# Patient Record
Sex: MALE | Race: WHITE | Employment: OTHER | URBAN - METROPOLITAN AREA
[De-identification: names, ages, dates, MRNs, and addresses within clinical notes are randomized per-mention and may not be internally consistent; named-entity substitution may affect disease eponyms.]

---

## 2017-01-17 ENCOUNTER — LAB REQUISITION (OUTPATIENT)
Dept: LAB | Facility: HOSPITAL | Age: 82
End: 2017-01-17
Payer: MEDICARE

## 2017-01-17 DIAGNOSIS — R73.01 IMPAIRED FASTING GLUCOSE: ICD-10-CM

## 2017-01-17 DIAGNOSIS — E78.00 PURE HYPERCHOLESTEROLEMIA: ICD-10-CM

## 2017-01-17 DIAGNOSIS — E03.9 HYPOTHYROIDISM: ICD-10-CM

## 2017-01-17 DIAGNOSIS — E83.110 HEREDITARY HEMOCHROMATOSIS (HCC): ICD-10-CM

## 2017-01-17 LAB
ALT SERPL-CCNC: 16 U/L (ref 17–63)
ANION GAP SERPL CALC-SCNC: 10 MMOL/L (ref 0–18)
BASOPHILS # BLD: 0.1 K/UL (ref 0–0.2)
BASOPHILS NFR BLD: 3 %
BUN SERPL-MCNC: 16 MG/DL (ref 8–20)
BUN/CREAT SERPL: 17 (ref 10–20)
CALCIUM SERPL-MCNC: 9.1 MG/DL (ref 8.5–10.5)
CHLORIDE SERPL-SCNC: 106 MMOL/L (ref 95–110)
CHOLEST SERPL-MCNC: 128 MG/DL (ref 110–200)
CO2 SERPL-SCNC: 26 MMOL/L (ref 22–32)
CREAT SERPL-MCNC: 0.94 MG/DL (ref 0.5–1.5)
EOSINOPHIL # BLD: 0.2 K/UL (ref 0–0.7)
EOSINOPHIL NFR BLD: 5 %
ERYTHROCYTE [DISTWIDTH] IN BLOOD BY AUTOMATED COUNT: 21 % (ref 11–15)
GLUCOSE SERPL-MCNC: 95 MG/DL (ref 70–99)
HBA1C MFR BLD: 5.1 % (ref 4–6)
HCT VFR BLD AUTO: 29.6 % (ref 41–52)
HDLC SERPL-MCNC: 67 MG/DL
HGB BLD-MCNC: 10 G/DL (ref 13.5–17.5)
LDLC SERPL CALC-MCNC: 55 MG/DL (ref 0–99)
LYMPHOCYTES # BLD: 1.2 K/UL (ref 1–4)
LYMPHOCYTES NFR BLD: 33 %
MCH RBC QN AUTO: 36.2 PG (ref 27–32)
MCHC RBC AUTO-ENTMCNC: 33.8 G/DL (ref 32–37)
MCV RBC AUTO: 107.2 FL (ref 80–100)
MONOCYTES # BLD: 0.7 K/UL (ref 0–1)
MONOCYTES NFR BLD: 19 %
NEUTROPHILS # BLD AUTO: 1.5 K/UL (ref 1.8–7.7)
NEUTROPHILS NFR BLD: 41 %
NONHDLC SERPL-MCNC: 61 MG/DL
OSMOLALITY UR CALC.SUM OF ELEC: 295 MOSM/KG (ref 275–295)
PLATELET # BLD AUTO: 351 K/UL (ref 140–400)
PMV BLD AUTO: 7.7 FL (ref 7.4–10.3)
POTASSIUM SERPL-SCNC: 3.7 MMOL/L (ref 3.3–5.1)
RBC # BLD AUTO: 2.76 M/UL (ref 4.5–5.9)
SODIUM SERPL-SCNC: 142 MMOL/L (ref 136–144)
TRIGL SERPL-MCNC: 29 MG/DL (ref 1–149)
TSH SERPL-ACNC: 4.76 UIU/ML (ref 0.34–5.6)
WBC # BLD AUTO: 3.7 K/UL (ref 4–11)

## 2017-01-17 PROCEDURE — 83036 HEMOGLOBIN GLYCOSYLATED A1C: CPT | Performed by: INTERNAL MEDICINE

## 2017-01-17 PROCEDURE — 80061 LIPID PANEL: CPT | Performed by: INTERNAL MEDICINE

## 2017-01-17 PROCEDURE — 85025 COMPLETE CBC W/AUTO DIFF WBC: CPT | Performed by: INTERNAL MEDICINE

## 2017-01-17 PROCEDURE — 80048 BASIC METABOLIC PNL TOTAL CA: CPT | Performed by: INTERNAL MEDICINE

## 2017-01-17 PROCEDURE — 84460 ALANINE AMINO (ALT) (SGPT): CPT | Performed by: INTERNAL MEDICINE

## 2017-01-17 PROCEDURE — 84443 ASSAY THYROID STIM HORMONE: CPT | Performed by: INTERNAL MEDICINE

## 2017-07-18 ENCOUNTER — LAB REQUISITION (OUTPATIENT)
Dept: LAB | Facility: HOSPITAL | Age: 82
End: 2017-07-18
Payer: MEDICARE

## 2017-07-18 DIAGNOSIS — R73.01 IMPAIRED FASTING GLUCOSE: ICD-10-CM

## 2017-07-18 DIAGNOSIS — E78.00 PURE HYPERCHOLESTEROLEMIA: ICD-10-CM

## 2017-07-18 LAB
ALBUMIN SERPL BCP-MCNC: 4.3 G/DL (ref 3.5–4.8)
ALBUMIN/GLOB SERPL: 2 {RATIO} (ref 1–2)
ALP SERPL-CCNC: 32 U/L (ref 32–100)
ALT SERPL-CCNC: 21 U/L (ref 17–63)
ANION GAP SERPL CALC-SCNC: 7 MMOL/L (ref 0–18)
AST SERPL-CCNC: 27 U/L (ref 15–41)
BASOPHILS # BLD: 0.1 K/UL (ref 0–0.2)
BASOPHILS NFR BLD: 3 %
BILIRUB SERPL-MCNC: 1.5 MG/DL (ref 0.3–1.2)
BUN SERPL-MCNC: 17 MG/DL (ref 8–20)
BUN/CREAT SERPL: 20.5 (ref 10–20)
CALCIUM SERPL-MCNC: 8.7 MG/DL (ref 8.5–10.5)
CHLORIDE SERPL-SCNC: 106 MMOL/L (ref 95–110)
CHOLEST SERPL-MCNC: 122 MG/DL (ref 110–200)
CO2 SERPL-SCNC: 25 MMOL/L (ref 22–32)
CREAT SERPL-MCNC: 0.83 MG/DL (ref 0.5–1.5)
EOSINOPHIL # BLD: 0.2 K/UL (ref 0–0.7)
EOSINOPHIL NFR BLD: 5 %
ERYTHROCYTE [DISTWIDTH] IN BLOOD BY AUTOMATED COUNT: 17.7 % (ref 11–15)
GLOBULIN PLAS-MCNC: 2.1 G/DL (ref 2.5–3.7)
GLUCOSE SERPL-MCNC: 101 MG/DL (ref 70–99)
HCT VFR BLD AUTO: 28.2 % (ref 41–52)
HDLC SERPL-MCNC: 63 MG/DL
HGB BLD-MCNC: 9.6 G/DL (ref 13.5–17.5)
LDLC SERPL CALC-MCNC: 53 MG/DL (ref 0–99)
LYMPHOCYTES # BLD: 1.2 K/UL (ref 1–4)
LYMPHOCYTES NFR BLD: 26 %
MCH RBC QN AUTO: 37.7 PG (ref 27–32)
MCHC RBC AUTO-ENTMCNC: 34.1 G/DL (ref 32–37)
MCV RBC AUTO: 110.7 FL (ref 80–100)
MONOCYTES # BLD: 0.9 K/UL (ref 0–1)
MONOCYTES NFR BLD: 19 %
NEUTROPHILS # BLD AUTO: 2.2 K/UL (ref 1.8–7.7)
NEUTROPHILS NFR BLD: 48 %
NONHDLC SERPL-MCNC: 59 MG/DL
OSMOLALITY UR CALC.SUM OF ELEC: 288 MOSM/KG (ref 275–295)
PLATELET # BLD AUTO: 385 K/UL (ref 140–400)
PMV BLD AUTO: 7.6 FL (ref 7.4–10.3)
POTASSIUM SERPL-SCNC: 4.1 MMOL/L (ref 3.3–5.1)
PROT SERPL-MCNC: 6.4 G/DL (ref 5.9–8.4)
RBC # BLD AUTO: 2.55 M/UL (ref 4.5–5.9)
SODIUM SERPL-SCNC: 138 MMOL/L (ref 136–144)
TRIGL SERPL-MCNC: 31 MG/DL (ref 1–149)
TSH SERPL-ACNC: 3.79 UIU/ML (ref 0.45–5.33)
WBC # BLD AUTO: 4.6 K/UL (ref 4–11)

## 2017-07-18 PROCEDURE — 80053 COMPREHEN METABOLIC PANEL: CPT | Performed by: INTERNAL MEDICINE

## 2017-07-18 PROCEDURE — 84443 ASSAY THYROID STIM HORMONE: CPT | Performed by: INTERNAL MEDICINE

## 2017-07-18 PROCEDURE — 83036 HEMOGLOBIN GLYCOSYLATED A1C: CPT | Performed by: INTERNAL MEDICINE

## 2017-07-18 PROCEDURE — 80061 LIPID PANEL: CPT | Performed by: INTERNAL MEDICINE

## 2017-07-18 PROCEDURE — 85025 COMPLETE CBC W/AUTO DIFF WBC: CPT | Performed by: INTERNAL MEDICINE

## 2017-07-19 LAB — HBA1C MFR BLD: 5.3 % (ref 4–6)

## 2018-01-16 ENCOUNTER — LAB REQUISITION (OUTPATIENT)
Dept: LAB | Facility: HOSPITAL | Age: 83
End: 2018-01-16
Payer: MEDICARE

## 2018-01-16 DIAGNOSIS — R73.01 IMPAIRED FASTING GLUCOSE: ICD-10-CM

## 2018-01-16 DIAGNOSIS — E78.00 PURE HYPERCHOLESTEROLEMIA: ICD-10-CM

## 2018-01-16 DIAGNOSIS — E83.110 HEREDITARY HEMOCHROMATOSIS (HCC): ICD-10-CM

## 2018-01-16 DIAGNOSIS — E03.9 HYPOTHYROIDISM: ICD-10-CM

## 2018-01-16 LAB
ALT SERPL-CCNC: 18 U/L (ref 17–63)
ANION GAP SERPL CALC-SCNC: 9 MMOL/L (ref 0–18)
BASOPHILS # BLD: 0.1 K/UL (ref 0–0.2)
BASOPHILS NFR BLD: 4 %
BUN SERPL-MCNC: 16 MG/DL (ref 8–20)
BUN/CREAT SERPL: 17.2 (ref 10–20)
CALCIUM SERPL-MCNC: 9 MG/DL (ref 8.5–10.5)
CHLORIDE SERPL-SCNC: 105 MMOL/L (ref 95–110)
CHOLEST SERPL-MCNC: 119 MG/DL (ref 110–200)
CO2 SERPL-SCNC: 25 MMOL/L (ref 22–32)
CREAT SERPL-MCNC: 0.93 MG/DL (ref 0.5–1.5)
EOSINOPHIL # BLD: 0.2 K/UL (ref 0–0.7)
EOSINOPHIL NFR BLD: 5 %
ERYTHROCYTE [DISTWIDTH] IN BLOOD BY AUTOMATED COUNT: 18.1 % (ref 11–15)
FERRITIN SERPL IA-MCNC: 1159 NG/ML (ref 24–336)
GLUCOSE SERPL-MCNC: 100 MG/DL (ref 70–99)
HBA1C MFR BLD: 5 % (ref 4–6)
HCT VFR BLD AUTO: 29.8 % (ref 41–52)
HDLC SERPL-MCNC: 55 MG/DL
HGB BLD-MCNC: 9.8 G/DL (ref 13.5–17.5)
LDLC SERPL CALC-MCNC: 57 MG/DL (ref 0–99)
LYMPHOCYTES # BLD: 1.1 K/UL (ref 1–4)
LYMPHOCYTES NFR BLD: 28 %
MCH RBC QN AUTO: 35.7 PG (ref 27–32)
MCHC RBC AUTO-ENTMCNC: 32.7 G/DL (ref 32–37)
MCV RBC AUTO: 109.2 FL (ref 80–100)
MONOCYTES # BLD: 0.8 K/UL (ref 0–1)
MONOCYTES NFR BLD: 23 %
NEUTROPHILS # BLD AUTO: 1.5 K/UL (ref 1.8–7.7)
NEUTROPHILS NFR BLD: 41 %
NONHDLC SERPL-MCNC: 64 MG/DL
OSMOLALITY UR CALC.SUM OF ELEC: 289 MOSM/KG (ref 275–295)
PLATELET # BLD AUTO: 404 K/UL (ref 140–400)
PMV BLD AUTO: 7.2 FL (ref 7.4–10.3)
POTASSIUM SERPL-SCNC: 4.2 MMOL/L (ref 3.3–5.1)
RBC # BLD AUTO: 2.73 M/UL (ref 4.5–5.9)
SODIUM SERPL-SCNC: 139 MMOL/L (ref 136–144)
TRIGL SERPL-MCNC: 34 MG/DL (ref 1–149)
TSH SERPL-ACNC: 3.96 UIU/ML (ref 0.45–5.33)
WBC # BLD AUTO: 3.8 K/UL (ref 4–11)

## 2018-01-16 PROCEDURE — 83036 HEMOGLOBIN GLYCOSYLATED A1C: CPT | Performed by: INTERNAL MEDICINE

## 2018-01-16 PROCEDURE — 84460 ALANINE AMINO (ALT) (SGPT): CPT | Performed by: INTERNAL MEDICINE

## 2018-01-16 PROCEDURE — 80061 LIPID PANEL: CPT | Performed by: INTERNAL MEDICINE

## 2018-01-16 PROCEDURE — 80048 BASIC METABOLIC PNL TOTAL CA: CPT | Performed by: INTERNAL MEDICINE

## 2018-01-16 PROCEDURE — 84443 ASSAY THYROID STIM HORMONE: CPT | Performed by: INTERNAL MEDICINE

## 2018-01-16 PROCEDURE — 82728 ASSAY OF FERRITIN: CPT | Performed by: INTERNAL MEDICINE

## 2018-01-16 PROCEDURE — 85025 COMPLETE CBC W/AUTO DIFF WBC: CPT | Performed by: INTERNAL MEDICINE

## 2018-07-17 ENCOUNTER — LAB REQUISITION (OUTPATIENT)
Dept: LAB | Facility: HOSPITAL | Age: 83
End: 2018-07-17
Payer: MEDICARE

## 2018-07-17 DIAGNOSIS — R73.01 IMPAIRED FASTING GLUCOSE: ICD-10-CM

## 2018-07-17 DIAGNOSIS — E83.110 HEREDITARY HEMOCHROMATOSIS (HCC): ICD-10-CM

## 2018-07-17 DIAGNOSIS — E78.00 PURE HYPERCHOLESTEROLEMIA: ICD-10-CM

## 2018-07-17 LAB
ALBUMIN SERPL BCP-MCNC: 4.7 G/DL (ref 3.5–4.8)
ALBUMIN/GLOB SERPL: 2.2 {RATIO} (ref 1–2)
ALP SERPL-CCNC: 34 U/L (ref 32–100)
ALT SERPL-CCNC: 21 U/L (ref 17–63)
ANION GAP SERPL CALC-SCNC: 11 MMOL/L (ref 0–18)
AST SERPL-CCNC: 30 U/L (ref 15–41)
BASOPHILS # BLD: 0.1 K/UL (ref 0–0.2)
BASOPHILS NFR BLD: 3 %
BILIRUB SERPL-MCNC: 1.6 MG/DL (ref 0.3–1.2)
BUN SERPL-MCNC: 18 MG/DL (ref 8–20)
BUN/CREAT SERPL: 16.2 (ref 10–20)
CALCIUM SERPL-MCNC: 9.2 MG/DL (ref 8.5–10.5)
CHLORIDE SERPL-SCNC: 104 MMOL/L (ref 95–110)
CHOLEST SERPL-MCNC: 135 MG/DL (ref 110–200)
CO2 SERPL-SCNC: 23 MMOL/L (ref 22–32)
CREAT SERPL-MCNC: 1.11 MG/DL (ref 0.5–1.5)
EOSINOPHIL # BLD: 0.2 K/UL (ref 0–0.7)
EOSINOPHIL NFR BLD: 4 %
ERYTHROCYTE [DISTWIDTH] IN BLOOD BY AUTOMATED COUNT: 16.9 % (ref 11–15)
FERRITIN SERPL IA-MCNC: 1019 NG/ML (ref 24–336)
GLOBULIN PLAS-MCNC: 2.1 G/DL (ref 2.5–3.7)
GLUCOSE SERPL-MCNC: 101 MG/DL (ref 70–99)
HBA1C MFR BLD: 5 % (ref 4–6)
HCT VFR BLD AUTO: 29.1 % (ref 41–52)
HDLC SERPL-MCNC: 62 MG/DL
HGB BLD-MCNC: 9.8 G/DL (ref 13.5–17.5)
LDLC SERPL CALC-MCNC: 65 MG/DL (ref 0–99)
LYMPHOCYTES # BLD: 1.2 K/UL (ref 1–4)
LYMPHOCYTES NFR BLD: 28 %
MCH RBC QN AUTO: 37.4 PG (ref 27–32)
MCHC RBC AUTO-ENTMCNC: 33.7 G/DL (ref 32–37)
MCV RBC AUTO: 110.9 FL (ref 80–100)
MONOCYTES # BLD: 0.9 K/UL (ref 0–1)
MONOCYTES NFR BLD: 20 %
NEUTROPHILS # BLD AUTO: 2 K/UL (ref 1.8–7.7)
NEUTROPHILS NFR BLD: 46 %
NONHDLC SERPL-MCNC: 73 MG/DL
OSMOLALITY UR CALC.SUM OF ELEC: 288 MOSM/KG (ref 275–295)
PLATELET # BLD AUTO: 455 K/UL (ref 140–400)
PMV BLD AUTO: 7.6 FL (ref 7.4–10.3)
POTASSIUM SERPL-SCNC: 3.7 MMOL/L (ref 3.3–5.1)
PROT SERPL-MCNC: 6.8 G/DL (ref 5.9–8.4)
RBC # BLD AUTO: 2.63 M/UL (ref 4.5–5.9)
SODIUM SERPL-SCNC: 138 MMOL/L (ref 136–144)
TRIGL SERPL-MCNC: 41 MG/DL (ref 1–149)
TSH SERPL-ACNC: 5.07 UIU/ML (ref 0.45–5.33)
WBC # BLD AUTO: 4.4 K/UL (ref 4–11)

## 2018-07-17 PROCEDURE — 82728 ASSAY OF FERRITIN: CPT | Performed by: INTERNAL MEDICINE

## 2018-07-17 PROCEDURE — 83036 HEMOGLOBIN GLYCOSYLATED A1C: CPT | Performed by: INTERNAL MEDICINE

## 2018-07-17 PROCEDURE — 84443 ASSAY THYROID STIM HORMONE: CPT | Performed by: INTERNAL MEDICINE

## 2018-07-17 PROCEDURE — 85025 COMPLETE CBC W/AUTO DIFF WBC: CPT | Performed by: INTERNAL MEDICINE

## 2018-07-17 PROCEDURE — 80061 LIPID PANEL: CPT | Performed by: INTERNAL MEDICINE

## 2018-07-17 PROCEDURE — 80053 COMPREHEN METABOLIC PANEL: CPT | Performed by: INTERNAL MEDICINE

## 2019-01-15 ENCOUNTER — LAB REQUISITION (OUTPATIENT)
Dept: LAB | Facility: HOSPITAL | Age: 84
End: 2019-01-15
Payer: MEDICARE

## 2019-01-15 DIAGNOSIS — E83.110 HEREDITARY HEMOCHROMATOSIS (HCC): ICD-10-CM

## 2019-01-15 DIAGNOSIS — E78.00 PURE HYPERCHOLESTEROLEMIA: ICD-10-CM

## 2019-01-15 DIAGNOSIS — N18.30 CHRONIC KIDNEY DISEASE, STAGE III (MODERATE) (HCC): ICD-10-CM

## 2019-01-15 DIAGNOSIS — R73.01 IMPAIRED FASTING GLUCOSE: ICD-10-CM

## 2019-01-15 DIAGNOSIS — E03.9 HYPOTHYROIDISM: ICD-10-CM

## 2019-01-15 LAB
ALT SERPL-CCNC: 19 U/L (ref 17–63)
ANION GAP SERPL CALC-SCNC: 12 MMOL/L (ref 0–18)
BASOPHILS # BLD: 0.1 K/UL (ref 0–0.2)
BASOPHILS NFR BLD: 3 %
BUN SERPL-MCNC: 15 MG/DL (ref 8–20)
BUN/CREAT SERPL: 15.3 (ref 10–20)
CALCIUM SERPL-MCNC: 9.2 MG/DL (ref 8.5–10.5)
CHLORIDE SERPL-SCNC: 104 MMOL/L (ref 95–110)
CHOLEST SERPL-MCNC: 128 MG/DL (ref 110–200)
CO2 SERPL-SCNC: 24 MMOL/L (ref 22–32)
CREAT SERPL-MCNC: 0.98 MG/DL (ref 0.5–1.5)
EOSINOPHIL # BLD: 0.2 K/UL (ref 0–0.7)
EOSINOPHIL NFR BLD: 5 %
ERYTHROCYTE [DISTWIDTH] IN BLOOD BY AUTOMATED COUNT: 17.6 % (ref 11–15)
EST. AVERAGE GLUCOSE BLD GHB EST-MCNC: 94 MG/DL (ref 68–126)
FERRITIN SERPL IA-MCNC: 937 NG/ML (ref 24–336)
GLUCOSE SERPL-MCNC: 100 MG/DL (ref 70–99)
HBA1C MFR BLD HPLC: 4.9 % (ref ?–5.7)
HCT VFR BLD AUTO: 29.2 % (ref 41–52)
HDLC SERPL-MCNC: 60 MG/DL
HGB BLD-MCNC: 9.9 G/DL (ref 13.5–17.5)
LDLC SERPL CALC-MCNC: 60 MG/DL (ref 0–99)
LYMPHOCYTES # BLD: 1.3 K/UL (ref 1–4)
LYMPHOCYTES NFR BLD: 33 %
MCH RBC QN AUTO: 37.7 PG (ref 27–32)
MCHC RBC AUTO-ENTMCNC: 34.1 G/DL (ref 32–37)
MCV RBC AUTO: 110.6 FL (ref 80–100)
MONOCYTES # BLD: 0.8 K/UL (ref 0–1)
MONOCYTES NFR BLD: 20 %
NEUTROPHILS # BLD AUTO: 1.5 K/UL (ref 1.8–7.7)
NEUTROPHILS NFR BLD: 39 %
NONHDLC SERPL-MCNC: 68 MG/DL
OSMOLALITY UR CALC.SUM OF ELEC: 291 MOSM/KG (ref 275–295)
PLATELET # BLD AUTO: 452 K/UL (ref 140–400)
PMV BLD AUTO: 7.5 FL (ref 7.4–10.3)
POTASSIUM SERPL-SCNC: 3.9 MMOL/L (ref 3.3–5.1)
RBC # BLD AUTO: 2.64 M/UL (ref 4.5–5.9)
SODIUM SERPL-SCNC: 140 MMOL/L (ref 136–144)
TRIGL SERPL-MCNC: 39 MG/DL (ref 1–149)
TSH SERPL-ACNC: 4.71 UIU/ML (ref 0.45–5.33)
WBC # BLD AUTO: 3.9 K/UL (ref 4–11)

## 2019-01-15 PROCEDURE — 85025 COMPLETE CBC W/AUTO DIFF WBC: CPT | Performed by: INTERNAL MEDICINE

## 2019-01-15 PROCEDURE — 80061 LIPID PANEL: CPT | Performed by: INTERNAL MEDICINE

## 2019-01-15 PROCEDURE — 82728 ASSAY OF FERRITIN: CPT | Performed by: INTERNAL MEDICINE

## 2019-01-15 PROCEDURE — 84443 ASSAY THYROID STIM HORMONE: CPT | Performed by: INTERNAL MEDICINE

## 2019-01-15 PROCEDURE — 80048 BASIC METABOLIC PNL TOTAL CA: CPT | Performed by: INTERNAL MEDICINE

## 2019-01-15 PROCEDURE — 84460 ALANINE AMINO (ALT) (SGPT): CPT | Performed by: INTERNAL MEDICINE

## 2019-01-15 PROCEDURE — 83036 HEMOGLOBIN GLYCOSYLATED A1C: CPT | Performed by: INTERNAL MEDICINE

## 2019-07-16 ENCOUNTER — LAB REQUISITION (OUTPATIENT)
Dept: LAB | Facility: HOSPITAL | Age: 84
End: 2019-07-16
Payer: MEDICARE

## 2019-07-16 DIAGNOSIS — E83.110 HEREDITARY HEMOCHROMATOSIS (HCC): ICD-10-CM

## 2019-07-16 DIAGNOSIS — R73.01 IMPAIRED FASTING GLUCOSE: ICD-10-CM

## 2019-07-16 DIAGNOSIS — N18.30 CHRONIC KIDNEY DISEASE, STAGE III (MODERATE) (HCC): ICD-10-CM

## 2019-07-16 DIAGNOSIS — E78.00 PURE HYPERCHOLESTEROLEMIA: ICD-10-CM

## 2019-07-16 LAB
ALBUMIN SERPL-MCNC: 4.3 G/DL (ref 3.4–5)
ALBUMIN/GLOB SERPL: 1.7 {RATIO} (ref 1–2)
ALP LIVER SERPL-CCNC: 39 U/L (ref 45–117)
ALT SERPL-CCNC: 28 U/L (ref 16–61)
ANION GAP SERPL CALC-SCNC: 6 MMOL/L (ref 0–18)
AST SERPL-CCNC: 29 U/L (ref 15–37)
BASOPHILS # BLD AUTO: 0.09 X10(3) UL (ref 0–0.2)
BASOPHILS NFR BLD AUTO: 1.6 %
BILIRUB SERPL-MCNC: 2.1 MG/DL (ref 0.1–2)
BILIRUB UR QL: NEGATIVE
BUN BLD-MCNC: 18 MG/DL (ref 7–18)
BUN/CREAT SERPL: 18.6 (ref 10–20)
CALCIUM BLD-MCNC: 8.8 MG/DL (ref 8.5–10.1)
CHLORIDE SERPL-SCNC: 106 MMOL/L (ref 98–112)
CHOLEST SMN-MCNC: 108 MG/DL (ref ?–200)
CLARITY UR: CLEAR
CO2 SERPL-SCNC: 28 MMOL/L (ref 21–32)
COLOR UR: YELLOW
CREAT BLD-MCNC: 0.97 MG/DL (ref 0.7–1.3)
CREAT UR-SCNC: 51.4 MG/DL
DEPRECATED HBV CORE AB SER IA-ACNC: 951.2 NG/ML (ref 30–530)
DEPRECATED RDW RBC AUTO: 88.3 FL (ref 35.1–46.3)
EOSINOPHIL # BLD AUTO: 0.19 X10(3) UL (ref 0–0.7)
EOSINOPHIL NFR BLD AUTO: 3.4 %
ERYTHROCYTE [DISTWIDTH] IN BLOOD BY AUTOMATED COUNT: 20.4 % (ref 11–15)
EST. AVERAGE GLUCOSE BLD GHB EST-MCNC: 100 MG/DL (ref 68–126)
GLOBULIN PLAS-MCNC: 2.5 G/DL (ref 2.8–4.4)
GLUCOSE BLD-MCNC: 89 MG/DL (ref 70–99)
GLUCOSE UR-MCNC: NEGATIVE MG/DL
HBA1C MFR BLD HPLC: 5.1 % (ref ?–5.7)
HCT VFR BLD AUTO: 25.9 % (ref 39–53)
HDLC SERPL-MCNC: 63 MG/DL (ref 40–59)
HGB BLD-MCNC: 8.5 G/DL (ref 13–17.5)
HGB UR QL STRIP.AUTO: NEGATIVE
IMM GRANULOCYTES # BLD AUTO: 0.03 X10(3) UL (ref 0–1)
IMM GRANULOCYTES NFR BLD: 0.5 %
KETONES UR-MCNC: NEGATIVE MG/DL
LDLC SERPL CALC-MCNC: 38 MG/DL (ref ?–100)
LEUKOCYTE ESTERASE UR QL STRIP.AUTO: NEGATIVE
LYMPHOCYTES # BLD AUTO: 1.36 X10(3) UL (ref 1–4)
LYMPHOCYTES NFR BLD AUTO: 24.3 %
M PROTEIN MFR SERPL ELPH: 6.8 G/DL (ref 6.4–8.2)
MCH RBC QN AUTO: 37.1 PG (ref 26–34)
MCHC RBC AUTO-ENTMCNC: 32.8 G/DL (ref 31–37)
MCV RBC AUTO: 113.1 FL (ref 80–100)
MICROALBUMIN UR-MCNC: 0.72 MG/DL
MICROALBUMIN/CREAT 24H UR-RTO: 14 UG/MG (ref ?–30)
MONOCYTES # BLD AUTO: 1.19 X10(3) UL (ref 0.1–1)
MONOCYTES NFR BLD AUTO: 21.3 %
NEUTROPHILS # BLD AUTO: 2.73 X10 (3) UL (ref 1.5–7.7)
NEUTROPHILS # BLD AUTO: 2.73 X10(3) UL (ref 1.5–7.7)
NEUTROPHILS NFR BLD AUTO: 48.9 %
NITRITE UR QL STRIP.AUTO: NEGATIVE
NONHDLC SERPL-MCNC: 45 MG/DL (ref ?–130)
OSMOLALITY SERPL CALC.SUM OF ELEC: 291 MOSM/KG (ref 275–295)
PH UR: 6 [PH] (ref 5–8)
PLATELET # BLD AUTO: 403 10(3)UL (ref 150–450)
PLATELET MORPHOLOGY: NORMAL
POTASSIUM SERPL-SCNC: 4.2 MMOL/L (ref 3.5–5.1)
PROT UR-MCNC: NEGATIVE MG/DL
RBC # BLD AUTO: 2.29 X10(6)UL (ref 3.8–5.8)
SODIUM SERPL-SCNC: 140 MMOL/L (ref 136–145)
SP GR UR STRIP: 1.01 (ref 1–1.03)
TRIGL SERPL-MCNC: 35 MG/DL (ref 30–149)
TSI SER-ACNC: 4.02 MIU/ML (ref 0.36–3.74)
UROBILINOGEN UR STRIP-ACNC: <2
VIT C UR-MCNC: NEGATIVE MG/DL
VLDLC SERPL CALC-MCNC: 7 MG/DL (ref 0–30)
WBC # BLD AUTO: 5.6 X10(3) UL (ref 4–11)

## 2019-07-16 PROCEDURE — 84443 ASSAY THYROID STIM HORMONE: CPT | Performed by: INTERNAL MEDICINE

## 2019-07-16 PROCEDURE — 83036 HEMOGLOBIN GLYCOSYLATED A1C: CPT | Performed by: INTERNAL MEDICINE

## 2019-07-16 PROCEDURE — 81003 URINALYSIS AUTO W/O SCOPE: CPT | Performed by: INTERNAL MEDICINE

## 2019-07-16 PROCEDURE — 80053 COMPREHEN METABOLIC PANEL: CPT | Performed by: INTERNAL MEDICINE

## 2019-07-16 PROCEDURE — 80061 LIPID PANEL: CPT | Performed by: INTERNAL MEDICINE

## 2019-07-16 PROCEDURE — 82728 ASSAY OF FERRITIN: CPT | Performed by: INTERNAL MEDICINE

## 2019-07-16 PROCEDURE — 82043 UR ALBUMIN QUANTITATIVE: CPT | Performed by: INTERNAL MEDICINE

## 2019-07-16 PROCEDURE — 82570 ASSAY OF URINE CREATININE: CPT | Performed by: INTERNAL MEDICINE

## 2019-07-16 PROCEDURE — 85025 COMPLETE CBC W/AUTO DIFF WBC: CPT | Performed by: INTERNAL MEDICINE

## 2019-07-23 ENCOUNTER — OFFICE VISIT (OUTPATIENT)
Dept: HEMATOLOGY/ONCOLOGY | Facility: HOSPITAL | Age: 84
End: 2019-07-23
Attending: INTERNAL MEDICINE
Payer: MEDICARE

## 2019-07-23 VITALS
HEART RATE: 69 BPM | WEIGHT: 176.13 LBS | HEIGHT: 72 IN | RESPIRATION RATE: 18 BRPM | BODY MASS INDEX: 23.86 KG/M2 | OXYGEN SATURATION: 96 % | DIASTOLIC BLOOD PRESSURE: 52 MMHG | TEMPERATURE: 98 F | SYSTOLIC BLOOD PRESSURE: 134 MMHG

## 2019-07-23 DIAGNOSIS — D53.9 MACROCYTIC ANEMIA: Primary | ICD-10-CM

## 2019-07-23 DIAGNOSIS — E83.110 HEREDITARY HEMOCHROMATOSIS (HCC): ICD-10-CM

## 2019-07-23 PROCEDURE — 99204 OFFICE O/P NEW MOD 45 MIN: CPT | Performed by: INTERNAL MEDICINE

## 2019-07-23 RX ORDER — MIRTAZAPINE 15 MG/1
15 TABLET, FILM COATED ORAL
Refills: 1 | COMMUNITY
Start: 2019-07-13

## 2019-07-24 NOTE — CONSULTS
[facility]    PATIENT'S NAME: Betty Mendes   CONSULTING PHYSICIAN: [address] [place]  Angelo Francis MD   PATIENT ACCOUNT #: [de-identified] LOCATION: [address] [place] [address] RECORD #: [id number] YOB: 1931   CONSULTATION DATE: 07/23/2019       CANCER [place] drink or use any illicit drugs. FAMILY MEDICAL HISTORY:  As outlined above. His son has hemochromatosis. REVIEW OF SYSTEMS:  Other review of systems negative x12. PHYSICAL EXAMINATION:    GENERAL:  No acute distress. Alert and oriented. at this time, the patient is asymptomatic and he prefers a more minimalistic approach. Thank you very much for the consultation request and for allowing us to participate in the care of this delightful patient. Dictated By Candice Lin MD  d: 07/2

## 2019-09-11 ENCOUNTER — LAB ENCOUNTER (OUTPATIENT)
Dept: LAB | Facility: HOSPITAL | Age: 84
End: 2019-09-11
Attending: ANESTHESIOLOGY
Payer: MEDICARE

## 2019-09-11 DIAGNOSIS — D53.9 MACROCYTIC ANEMIA: ICD-10-CM

## 2019-09-11 DIAGNOSIS — E83.110 HEREDITARY HEMOCHROMATOSIS (HCC): ICD-10-CM

## 2019-09-11 LAB
ALBUMIN SERPL-MCNC: 4.3 G/DL (ref 3.4–5)
ALBUMIN/GLOB SERPL: 1.5 {RATIO} (ref 1–2)
ALP LIVER SERPL-CCNC: 43 U/L (ref 45–117)
ALT SERPL-CCNC: 28 U/L (ref 16–61)
ANION GAP SERPL CALC-SCNC: 11 MMOL/L (ref 0–18)
AST SERPL-CCNC: 34 U/L (ref 15–37)
BASOPHILS # BLD AUTO: 0.1 X10(3) UL (ref 0–0.2)
BASOPHILS NFR BLD AUTO: 2.2 %
BILIRUB SERPL-MCNC: 1.3 MG/DL (ref 0.1–2)
BUN BLD-MCNC: 18 MG/DL (ref 7–18)
BUN/CREAT SERPL: 19.4 (ref 10–20)
CALCIUM BLD-MCNC: 8.6 MG/DL (ref 8.5–10.1)
CHLORIDE SERPL-SCNC: 107 MMOL/L (ref 98–112)
CO2 SERPL-SCNC: 24 MMOL/L (ref 21–32)
CREAT BLD-MCNC: 0.93 MG/DL (ref 0.7–1.3)
DEPRECATED HBV CORE AB SER IA-ACNC: 1013 NG/ML (ref 30–530)
DEPRECATED RDW RBC AUTO: 79.5 FL (ref 35.1–46.3)
EOSINOPHIL # BLD AUTO: 0.29 X10(3) UL (ref 0–0.7)
EOSINOPHIL NFR BLD AUTO: 6.5 %
ERYTHROCYTE [DISTWIDTH] IN BLOOD BY AUTOMATED COUNT: 19.9 % (ref 11–15)
FOLATE SERPL-MCNC: 11.6 NG/ML (ref 8.7–?)
GLOBULIN PLAS-MCNC: 2.9 G/DL (ref 2.8–4.4)
GLUCOSE BLD-MCNC: 106 MG/DL (ref 70–99)
HCT VFR BLD AUTO: 28.2 % (ref 39–53)
HGB BLD-MCNC: 9.6 G/DL (ref 13–17.5)
IMM GRANULOCYTES # BLD AUTO: 0.03 X10(3) UL (ref 0–1)
IMM GRANULOCYTES NFR BLD: 0.7 %
IRON SATURATION: 79 % (ref 20–50)
IRON SERPL-MCNC: 229 UG/DL (ref 65–175)
LYMPHOCYTES # BLD AUTO: 1.58 X10(3) UL (ref 1–4)
LYMPHOCYTES NFR BLD AUTO: 35.2 %
M PROTEIN MFR SERPL ELPH: 7.2 G/DL (ref 6.4–8.2)
MCH RBC QN AUTO: 37.1 PG (ref 26–34)
MCHC RBC AUTO-ENTMCNC: 34 G/DL (ref 31–37)
MCV RBC AUTO: 108.9 FL (ref 80–100)
MONOCYTES # BLD AUTO: 0.87 X10(3) UL (ref 0.1–1)
MONOCYTES NFR BLD AUTO: 19.4 %
NEUTROPHILS # BLD AUTO: 1.62 X10 (3) UL (ref 1.5–7.7)
NEUTROPHILS # BLD AUTO: 1.62 X10(3) UL (ref 1.5–7.7)
NEUTROPHILS NFR BLD AUTO: 36 %
OSMOLALITY SERPL CALC.SUM OF ELEC: 296 MOSM/KG (ref 275–295)
PATIENT FASTING: YES
PLATELET # BLD AUTO: 452 10(3)UL (ref 150–450)
PLATELET MORPHOLOGY: NORMAL
POTASSIUM SERPL-SCNC: 4.1 MMOL/L (ref 3.5–5.1)
RBC # BLD AUTO: 2.59 X10(6)UL (ref 3.8–5.8)
SODIUM SERPL-SCNC: 142 MMOL/L (ref 136–145)
TOTAL IRON BINDING CAPACITY: 291 UG/DL (ref 240–450)
TRANSFERRIN SERPL-MCNC: 195 MG/DL (ref 200–360)
VIT B12 SERPL-MCNC: 692 PG/ML (ref 193–986)
WBC # BLD AUTO: 4.5 X10(3) UL (ref 4–11)

## 2019-09-11 PROCEDURE — 36415 COLL VENOUS BLD VENIPUNCTURE: CPT

## 2019-09-11 PROCEDURE — 83540 ASSAY OF IRON: CPT

## 2019-09-11 PROCEDURE — 82746 ASSAY OF FOLIC ACID SERUM: CPT

## 2019-09-11 PROCEDURE — 82607 VITAMIN B-12: CPT

## 2019-09-11 PROCEDURE — 81256 HFE GENE: CPT

## 2019-09-11 PROCEDURE — 85060 BLOOD SMEAR INTERPRETATION: CPT

## 2019-09-11 PROCEDURE — 80053 COMPREHEN METABOLIC PANEL: CPT

## 2019-09-11 PROCEDURE — 84466 ASSAY OF TRANSFERRIN: CPT

## 2019-09-11 PROCEDURE — 82728 ASSAY OF FERRITIN: CPT

## 2019-09-11 PROCEDURE — 85025 COMPLETE CBC W/AUTO DIFF WBC: CPT

## 2019-09-19 ENCOUNTER — OFFICE VISIT (OUTPATIENT)
Dept: HEMATOLOGY/ONCOLOGY | Facility: HOSPITAL | Age: 84
End: 2019-09-19
Attending: INTERNAL MEDICINE
Payer: MEDICARE

## 2019-09-19 VITALS
OXYGEN SATURATION: 96 % | DIASTOLIC BLOOD PRESSURE: 52 MMHG | RESPIRATION RATE: 18 BRPM | TEMPERATURE: 98 F | WEIGHT: 176 LBS | BODY MASS INDEX: 24 KG/M2 | SYSTOLIC BLOOD PRESSURE: 129 MMHG | HEART RATE: 63 BPM

## 2019-09-19 DIAGNOSIS — D53.9 MACROCYTIC ANEMIA: ICD-10-CM

## 2019-09-19 DIAGNOSIS — E83.19 IRON OVERLOAD: ICD-10-CM

## 2019-09-19 DIAGNOSIS — E83.110 HEREDITARY HEMOCHROMATOSIS (HCC): Primary | ICD-10-CM

## 2019-09-19 PROCEDURE — 99214 OFFICE O/P EST MOD 30 MIN: CPT | Performed by: INTERNAL MEDICINE

## 2019-09-19 NOTE — PROGRESS NOTES
Cancer Center Progress Note    Patient Name: Mi Murcia   YOB: 1931   Medical Record Number: [id number]   Attending Physician: Twin Paige M.D.      Chief Complaint:  Hemochromatosis (H63D heterozygous) macrocytic anemia    History of [place] Social connections:        Talks on phone: Not on file        Gets together: Not on file        Attends Uatsdin service: Not on file        Active member of club or organization: Not on file        Attends meetings of clubs or organizations: Not on sriram (H) 09/11/2019    BUN 18 09/11/2019    BUNCREA 19.4 09/11/2019    CREATSERUM 0.93 09/11/2019    ANIONGAP 11 09/11/2019    GFRNAA 73 09/11/2019    GFRAA 84 09/11/2019    CA 8.6 09/11/2019    OSMOCALC 296 (H) 09/11/2019    ALKPHO 43 (L) 09/11/2019    AST 34

## 2019-12-19 ENCOUNTER — APPOINTMENT (OUTPATIENT)
Dept: HEMATOLOGY/ONCOLOGY | Facility: HOSPITAL | Age: 84
End: 2019-12-19
Attending: INTERNAL MEDICINE
Payer: MEDICARE

## 2020-01-14 ENCOUNTER — LAB REQUISITION (OUTPATIENT)
Dept: LAB | Facility: HOSPITAL | Age: 85
End: 2020-01-14
Payer: MEDICARE

## 2020-01-14 DIAGNOSIS — E03.9 HYPOTHYROIDISM, UNSPECIFIED: ICD-10-CM

## 2020-01-14 DIAGNOSIS — E78.00 PURE HYPERCHOLESTEROLEMIA, UNSPECIFIED: ICD-10-CM

## 2020-01-14 DIAGNOSIS — D64.9 ANEMIA, UNSPECIFIED: ICD-10-CM

## 2020-01-14 DIAGNOSIS — E83.110 HEREDITARY HEMOCHROMATOSIS (HCC): ICD-10-CM

## 2020-01-14 DIAGNOSIS — R73.01 IMPAIRED FASTING GLUCOSE: ICD-10-CM

## 2020-01-14 LAB
ALT SERPL-CCNC: 22 U/L (ref 16–61)
ANION GAP SERPL CALC-SCNC: 6 MMOL/L (ref 0–18)
BASOPHILS # BLD AUTO: 0.09 X10(3) UL (ref 0–0.2)
BASOPHILS NFR BLD AUTO: 2.8 %
BUN BLD-MCNC: 17 MG/DL (ref 7–18)
BUN/CREAT SERPL: 17.9 (ref 10–20)
CALCIUM BLD-MCNC: 8.5 MG/DL (ref 8.5–10.1)
CHLORIDE SERPL-SCNC: 109 MMOL/L (ref 98–112)
CHOLEST SMN-MCNC: 115 MG/DL (ref ?–200)
CO2 SERPL-SCNC: 26 MMOL/L (ref 21–32)
CREAT BLD-MCNC: 0.95 MG/DL (ref 0.7–1.3)
DEPRECATED HBV CORE AB SER IA-ACNC: 932.7 NG/ML (ref 30–530)
DEPRECATED RDW RBC AUTO: 82.7 FL (ref 35.1–46.3)
EOSINOPHIL # BLD AUTO: 0.19 X10(3) UL (ref 0–0.7)
EOSINOPHIL NFR BLD AUTO: 5.8 %
ERYTHROCYTE [DISTWIDTH] IN BLOOD BY AUTOMATED COUNT: 20.3 % (ref 11–15)
EST. AVERAGE GLUCOSE BLD GHB EST-MCNC: 100 MG/DL (ref 68–126)
GLUCOSE BLD-MCNC: 95 MG/DL (ref 70–99)
HBA1C MFR BLD HPLC: 5.1 % (ref ?–5.7)
HCT VFR BLD AUTO: 26.1 % (ref 39–53)
HDLC SERPL-MCNC: 67 MG/DL (ref 40–59)
HGB BLD-MCNC: 8.7 G/DL (ref 13–17.5)
IMM GRANULOCYTES # BLD AUTO: 0.02 X10(3) UL (ref 0–1)
IMM GRANULOCYTES NFR BLD: 0.6 %
LDLC SERPL CALC-MCNC: 40 MG/DL (ref ?–100)
LYMPHOCYTES # BLD AUTO: 1.05 X10(3) UL (ref 1–4)
LYMPHOCYTES NFR BLD AUTO: 32.1 %
MCH RBC QN AUTO: 36.7 PG (ref 26–34)
MCHC RBC AUTO-ENTMCNC: 33.3 G/DL (ref 31–37)
MCV RBC AUTO: 110.1 FL (ref 80–100)
MONOCYTES # BLD AUTO: 0.69 X10(3) UL (ref 0.1–1)
MONOCYTES NFR BLD AUTO: 21.1 %
NEUTROPHILS # BLD AUTO: 1.23 X10 (3) UL (ref 1.5–7.7)
NEUTROPHILS # BLD AUTO: 1.23 X10(3) UL (ref 1.5–7.7)
NEUTROPHILS NFR BLD AUTO: 37.6 %
NONHDLC SERPL-MCNC: 48 MG/DL (ref ?–130)
OSMOLALITY SERPL CALC.SUM OF ELEC: 293 MOSM/KG (ref 275–295)
PLATELET # BLD AUTO: 421 10(3)UL (ref 150–450)
PLATELET MORPHOLOGY: NORMAL
POTASSIUM SERPL-SCNC: 3.5 MMOL/L (ref 3.5–5.1)
RBC # BLD AUTO: 2.37 X10(6)UL (ref 3.8–5.8)
SODIUM SERPL-SCNC: 141 MMOL/L (ref 136–145)
TRIGL SERPL-MCNC: 38 MG/DL (ref 30–149)
TSI SER-ACNC: 4.6 MIU/ML (ref 0.36–3.74)
VLDLC SERPL CALC-MCNC: 8 MG/DL (ref 0–30)
WBC # BLD AUTO: 3.3 X10(3) UL (ref 4–11)

## 2020-01-14 PROCEDURE — 80061 LIPID PANEL: CPT | Performed by: INTERNAL MEDICINE

## 2020-01-14 PROCEDURE — 84443 ASSAY THYROID STIM HORMONE: CPT | Performed by: INTERNAL MEDICINE

## 2020-01-14 PROCEDURE — 80048 BASIC METABOLIC PNL TOTAL CA: CPT | Performed by: INTERNAL MEDICINE

## 2020-01-14 PROCEDURE — 85025 COMPLETE CBC W/AUTO DIFF WBC: CPT | Performed by: INTERNAL MEDICINE

## 2020-01-14 PROCEDURE — 84460 ALANINE AMINO (ALT) (SGPT): CPT | Performed by: INTERNAL MEDICINE

## 2020-01-14 PROCEDURE — 82728 ASSAY OF FERRITIN: CPT | Performed by: INTERNAL MEDICINE

## 2020-01-14 PROCEDURE — 83036 HEMOGLOBIN GLYCOSYLATED A1C: CPT | Performed by: INTERNAL MEDICINE

## 2020-07-14 ENCOUNTER — LAB ENCOUNTER (OUTPATIENT)
Dept: LAB | Facility: HOSPITAL | Age: 85
End: 2020-07-14
Attending: INTERNAL MEDICINE
Payer: MEDICARE

## 2020-07-14 DIAGNOSIS — N18.30 CHRONIC KIDNEY DISEASE, STAGE III (MODERATE) (HCC): ICD-10-CM

## 2020-07-14 DIAGNOSIS — R73.01 IMPAIRED FASTING GLUCOSE: ICD-10-CM

## 2020-07-14 DIAGNOSIS — E78.00 PURE HYPERCHOLESTEROLEMIA: ICD-10-CM

## 2020-07-14 DIAGNOSIS — E83.110 HEREDITARY HEMOCHROMATOSIS (HCC): Primary | ICD-10-CM

## 2020-07-14 LAB
ALBUMIN SERPL-MCNC: 4.1 G/DL (ref 3.4–5)
ALBUMIN/GLOB SERPL: 1.3 {RATIO} (ref 1–2)
ALP LIVER SERPL-CCNC: 41 U/L (ref 45–117)
ALT SERPL-CCNC: 26 U/L (ref 16–61)
ANION GAP SERPL CALC-SCNC: 6 MMOL/L (ref 0–18)
AST SERPL-CCNC: 29 U/L (ref 15–37)
BASOPHILS # BLD AUTO: 0.07 X10(3) UL (ref 0–0.2)
BASOPHILS NFR BLD AUTO: 1.5 %
BILIRUB SERPL-MCNC: 1.1 MG/DL (ref 0.1–2)
BILIRUB UR QL: NEGATIVE
BUN BLD-MCNC: 17 MG/DL (ref 7–18)
BUN/CREAT SERPL: 18.7 (ref 10–20)
CALCIUM BLD-MCNC: 8.8 MG/DL (ref 8.5–10.1)
CHLORIDE SERPL-SCNC: 108 MMOL/L (ref 98–112)
CHOLEST SMN-MCNC: 118 MG/DL (ref ?–200)
CLARITY UR: CLEAR
CO2 SERPL-SCNC: 26 MMOL/L (ref 21–32)
COLOR UR: YELLOW
CREAT BLD-MCNC: 0.91 MG/DL (ref 0.7–1.3)
CREAT UR-SCNC: 64.1 MG/DL
DEPRECATED HBV CORE AB SER IA-ACNC: 875.5 NG/ML (ref 30–530)
DEPRECATED RDW RBC AUTO: 85.8 FL (ref 35.1–46.3)
EOSINOPHIL # BLD AUTO: 0.3 X10(3) UL (ref 0–0.7)
EOSINOPHIL NFR BLD AUTO: 6.5 %
ERYTHROCYTE [DISTWIDTH] IN BLOOD BY AUTOMATED COUNT: 21.7 % (ref 11–15)
EST. AVERAGE GLUCOSE BLD GHB EST-MCNC: 97 MG/DL (ref 68–126)
GLOBULIN PLAS-MCNC: 3.1 G/DL (ref 2.8–4.4)
GLUCOSE BLD-MCNC: 95 MG/DL (ref 70–99)
GLUCOSE UR-MCNC: NEGATIVE MG/DL
HBA1C MFR BLD HPLC: 5 % (ref ?–5.7)
HCT VFR BLD AUTO: 26.4 % (ref 39–53)
HDLC SERPL-MCNC: 62 MG/DL (ref 40–59)
HGB BLD-MCNC: 8.8 G/DL (ref 13–17.5)
HGB UR QL STRIP.AUTO: NEGATIVE
IMM GRANULOCYTES # BLD AUTO: 0.04 X10(3) UL (ref 0–1)
IMM GRANULOCYTES NFR BLD: 0.9 %
KETONES UR-MCNC: NEGATIVE MG/DL
LDLC SERPL CALC-MCNC: 47 MG/DL (ref ?–100)
LEUKOCYTE ESTERASE UR QL STRIP.AUTO: NEGATIVE
LYMPHOCYTES # BLD AUTO: 1.61 X10(3) UL (ref 1–4)
LYMPHOCYTES NFR BLD AUTO: 35.1 %
M PROTEIN MFR SERPL ELPH: 7.2 G/DL (ref 6.4–8.2)
MCH RBC QN AUTO: 35.3 PG (ref 26–34)
MCHC RBC AUTO-ENTMCNC: 33.3 G/DL (ref 31–37)
MCV RBC AUTO: 106 FL (ref 80–100)
MICROALBUMIN UR-MCNC: 2.79 MG/DL
MICROALBUMIN/CREAT 24H UR-RTO: 43.5 UG/MG (ref ?–30)
MONOCYTES # BLD AUTO: 0.74 X10(3) UL (ref 0.1–1)
MONOCYTES NFR BLD AUTO: 16.1 %
NEUTROPHILS # BLD AUTO: 1.83 X10 (3) UL (ref 1.5–7.7)
NEUTROPHILS # BLD AUTO: 1.83 X10(3) UL (ref 1.5–7.7)
NEUTROPHILS NFR BLD AUTO: 39.9 %
NITRITE UR QL STRIP.AUTO: NEGATIVE
NONHDLC SERPL-MCNC: 56 MG/DL (ref ?–130)
OSMOLALITY SERPL CALC.SUM OF ELEC: 291 MOSM/KG (ref 275–295)
PATIENT FASTING Y/N/NP: YES
PATIENT FASTING Y/N/NP: YES
PH UR: 6 [PH] (ref 5–8)
PLATELET # BLD AUTO: 428 10(3)UL (ref 150–450)
PLATELET MORPHOLOGY: NORMAL
POTASSIUM SERPL-SCNC: 3.7 MMOL/L (ref 3.5–5.1)
PROT UR-MCNC: NEGATIVE MG/DL
RBC # BLD AUTO: 2.49 X10(6)UL (ref 3.8–5.8)
SODIUM SERPL-SCNC: 140 MMOL/L (ref 136–145)
SP GR UR STRIP: 1.01 (ref 1–1.03)
TRIGL SERPL-MCNC: 44 MG/DL (ref 30–149)
TSI SER-ACNC: 6.81 MIU/ML (ref 0.36–3.74)
UROBILINOGEN UR STRIP-ACNC: <2
VLDLC SERPL CALC-MCNC: 9 MG/DL (ref 0–30)
WBC # BLD AUTO: 4.6 X10(3) UL (ref 4–11)

## 2020-07-14 PROCEDURE — 84443 ASSAY THYROID STIM HORMONE: CPT

## 2020-07-14 PROCEDURE — 85025 COMPLETE CBC W/AUTO DIFF WBC: CPT

## 2020-07-14 PROCEDURE — 80053 COMPREHEN METABOLIC PANEL: CPT

## 2020-07-14 PROCEDURE — 82043 UR ALBUMIN QUANTITATIVE: CPT

## 2020-07-14 PROCEDURE — 80061 LIPID PANEL: CPT

## 2020-07-14 PROCEDURE — 83036 HEMOGLOBIN GLYCOSYLATED A1C: CPT

## 2020-07-14 PROCEDURE — 81003 URINALYSIS AUTO W/O SCOPE: CPT

## 2020-07-14 PROCEDURE — 82728 ASSAY OF FERRITIN: CPT

## 2020-07-14 PROCEDURE — 36415 COLL VENOUS BLD VENIPUNCTURE: CPT

## 2020-07-14 PROCEDURE — 82570 ASSAY OF URINE CREATININE: CPT

## 2020-10-21 ENCOUNTER — ORDER TRANSCRIPTION (OUTPATIENT)
Dept: PHYSICAL THERAPY | Facility: HOSPITAL | Age: 85
End: 2020-10-21

## 2020-10-21 DIAGNOSIS — M25.511 RIGHT SHOULDER PAIN: ICD-10-CM

## 2020-10-21 DIAGNOSIS — M19.011 OSTEOARTHRITIS OF RIGHT SHOULDER REGION: Primary | ICD-10-CM

## 2020-10-21 DIAGNOSIS — M75.41 IMPINGEMENT SYNDROME OF RIGHT SHOULDER: ICD-10-CM

## 2020-11-04 ENCOUNTER — OFFICE VISIT (OUTPATIENT)
Dept: PHYSICAL THERAPY | Facility: HOSPITAL | Age: 85
End: 2020-11-04
Attending: PHYSICIAN ASSISTANT
Payer: MEDICARE

## 2020-11-04 DIAGNOSIS — M25.511 RIGHT SHOULDER PAIN, UNSPECIFIED CHRONICITY: ICD-10-CM

## 2020-11-04 DIAGNOSIS — M19.011 PRIMARY OSTEOARTHRITIS OF RIGHT SHOULDER: ICD-10-CM

## 2020-11-04 DIAGNOSIS — M75.41 IMPINGEMENT SYNDROME OF RIGHT SHOULDER: ICD-10-CM

## 2020-11-04 PROCEDURE — 97530 THERAPEUTIC ACTIVITIES: CPT

## 2020-11-04 PROCEDURE — 97140 MANUAL THERAPY 1/> REGIONS: CPT

## 2020-11-04 PROCEDURE — 97162 PT EVAL MOD COMPLEX 30 MIN: CPT

## 2020-11-04 NOTE — PROGRESS NOTES
SHOULDER EVALUATION:   Referring Physician: Dr. Williamson Heart  Diagnosis: R shoulder pain, R arm pain, neck pain     Date of Service: 11/4/2020     PATIENT SUMMARY   Priti Peter is a 80year old male who presents to therapy today with complaints of R anterolatera decreased R shoulder ROM, decreased cervical ROM, mildly decreased strength RUE with flexion/scaption, decreased sensation R>L hands, and + R median NTT.   Functional deficits include but are not limited to reaching above, lifting, pushing, pulling, and sle middle three fingers)  Hyposensitive  Normal   C8 (ulnar side of hand, ring/pinkey)  Hyposensitive  Normal   T1 (medial forearm)  Hyposensitive  Normal       Special testing L R   Impingement tests                                            Neers - +   ACJ (IASTM=instrument assisted), Dn=dry needling     Based on clinical rationale and outcome measures, this evaluation involved Moderate Complexity decision making due to 3+ personal factors/comorbidities, 3 body structures involved/activity limitations, and e therapist: Speedy Ohara, PT, DPT, OCS, CSCS 11/04/20, 8:49 AM      Physician's certification required: Yes  I certify the need for these services furnished under this plan of treatment and while under my care.     X________________________________________

## 2020-11-06 ENCOUNTER — OFFICE VISIT (OUTPATIENT)
Dept: PHYSICAL THERAPY | Facility: HOSPITAL | Age: 85
End: 2020-11-06
Attending: PHYSICIAN ASSISTANT
Payer: MEDICARE

## 2020-11-06 PROCEDURE — 97110 THERAPEUTIC EXERCISES: CPT

## 2020-11-06 PROCEDURE — 97140 MANUAL THERAPY 1/> REGIONS: CPT

## 2020-11-06 NOTE — PROGRESS NOTES
PHYSICAL THERAPY DAILY TREATMENT NOTE  Kendell Lopez  80year old  male  Diagnosis: R shoulder pain, R arm pain, neck pain      Insurance (Authorized # of Visits):  12    MEDICARE PART A&B  Authorizing Physician: Dr. Penny Vaughn  Next MD visit: none scheduled  F L 40 • Shoulder flex/abd 5/5 strong today post tx •  •  •  •    NV=next visit, AMRAP= as many reps as possible, RPE= rate of perceived exertion, LLLD= low load long duration, LETTY= repeated extension in standing, REIL= in lying, MWM=mobilization with movem

## 2020-11-11 ENCOUNTER — APPOINTMENT (OUTPATIENT)
Dept: PHYSICAL THERAPY | Facility: HOSPITAL | Age: 85
End: 2020-11-11
Attending: PHYSICIAN ASSISTANT
Payer: MEDICARE

## 2020-11-13 ENCOUNTER — OFFICE VISIT (OUTPATIENT)
Dept: PHYSICAL THERAPY | Facility: HOSPITAL | Age: 85
End: 2020-11-13
Attending: PHYSICIAN ASSISTANT
Payer: MEDICARE

## 2020-11-13 PROCEDURE — 97110 THERAPEUTIC EXERCISES: CPT

## 2020-11-13 PROCEDURE — 97140 MANUAL THERAPY 1/> REGIONS: CPT

## 2020-11-13 NOTE — PROGRESS NOTES
PHYSICAL THERAPY DAILY TREATMENT NOTE  Jossue Earl  80year old  male  Diagnosis: R shoulder pain, R arm pain, neck pain      Insurance (Authorized # of Visits):  12    MEDICARE PART A&B  Authorizing Physician: Dr. Woody Naranjo  Next MD visit: none scheduled  F hands •  •  •  •  •    Objective comparable signs • R median NTT +  • R cervical rotation pain  • L C2 uPA R shoulder pain  • R shoulder flexion OP pain  • Flexion/abd 4/5*  • ACTIVE: Flexion: R 115; L 135, IR scratch: R 20cm deficit, ER scratch: R 4cm def

## 2020-11-16 ENCOUNTER — TELEPHONE (OUTPATIENT)
Dept: PHYSICAL THERAPY | Facility: HOSPITAL | Age: 85
End: 2020-11-16

## 2020-11-18 ENCOUNTER — APPOINTMENT (OUTPATIENT)
Dept: PHYSICAL THERAPY | Facility: HOSPITAL | Age: 85
End: 2020-11-18
Attending: PHYSICIAN ASSISTANT
Payer: MEDICARE

## 2020-11-20 ENCOUNTER — APPOINTMENT (OUTPATIENT)
Dept: PHYSICAL THERAPY | Facility: HOSPITAL | Age: 85
End: 2020-11-20
Attending: PHYSICIAN ASSISTANT
Payer: MEDICARE

## 2020-11-24 ENCOUNTER — APPOINTMENT (OUTPATIENT)
Dept: PHYSICAL THERAPY | Facility: HOSPITAL | Age: 85
End: 2020-11-24
Attending: PHYSICIAN ASSISTANT
Payer: MEDICARE

## 2020-11-27 ENCOUNTER — APPOINTMENT (OUTPATIENT)
Dept: PHYSICAL THERAPY | Facility: HOSPITAL | Age: 85
End: 2020-11-27
Attending: PHYSICIAN ASSISTANT
Payer: MEDICARE

## 2020-12-02 ENCOUNTER — APPOINTMENT (OUTPATIENT)
Dept: PHYSICAL THERAPY | Facility: HOSPITAL | Age: 85
End: 2020-12-02
Attending: PHYSICIAN ASSISTANT
Payer: MEDICARE

## 2020-12-04 ENCOUNTER — APPOINTMENT (OUTPATIENT)
Dept: PHYSICAL THERAPY | Facility: HOSPITAL | Age: 85
End: 2020-12-04
Attending: PHYSICIAN ASSISTANT
Payer: MEDICARE

## 2020-12-09 ENCOUNTER — APPOINTMENT (OUTPATIENT)
Dept: PHYSICAL THERAPY | Facility: HOSPITAL | Age: 85
End: 2020-12-09
Attending: PHYSICIAN ASSISTANT
Payer: MEDICARE

## 2020-12-11 ENCOUNTER — APPOINTMENT (OUTPATIENT)
Dept: PHYSICAL THERAPY | Facility: HOSPITAL | Age: 85
End: 2020-12-11
Attending: PHYSICIAN ASSISTANT
Payer: MEDICARE

## 2021-01-20 ENCOUNTER — LAB ENCOUNTER (OUTPATIENT)
Dept: LAB | Facility: HOSPITAL | Age: 86
End: 2021-01-20
Attending: INTERNAL MEDICINE
Payer: MEDICARE

## 2021-01-20 DIAGNOSIS — E03.9 HYPOTHYROIDISM: ICD-10-CM

## 2021-01-20 DIAGNOSIS — E78.00 PURE HYPERCHOLESTEROLEMIA: Primary | ICD-10-CM

## 2021-01-20 DIAGNOSIS — N39.0 URINARY TRACT INFECTION, SITE NOT SPECIFIED: ICD-10-CM

## 2021-01-20 LAB
ALT SERPL-CCNC: 30 U/L
ANION GAP SERPL CALC-SCNC: 7 MMOL/L (ref 0–18)
BACTERIA UR QL AUTO: NEGATIVE /HPF
BASOPHILS # BLD AUTO: 0.1 X10(3) UL (ref 0–0.2)
BASOPHILS NFR BLD AUTO: 2.1 %
BILIRUB UR QL: NEGATIVE
BUN BLD-MCNC: 22 MG/DL (ref 7–18)
BUN/CREAT SERPL: 22 (ref 10–20)
CALCIUM BLD-MCNC: 9.2 MG/DL (ref 8.5–10.1)
CHLORIDE SERPL-SCNC: 104 MMOL/L (ref 98–112)
CHOLEST SMN-MCNC: 124 MG/DL (ref ?–200)
CLARITY UR: CLEAR
CO2 SERPL-SCNC: 28 MMOL/L (ref 21–32)
COLOR UR: YELLOW
CREAT BLD-MCNC: 1 MG/DL
DEPRECATED RDW RBC AUTO: 88.8 FL (ref 35.1–46.3)
EOSINOPHIL # BLD AUTO: 0.28 X10(3) UL (ref 0–0.7)
EOSINOPHIL NFR BLD AUTO: 6 %
ERYTHROCYTE [DISTWIDTH] IN BLOOD BY AUTOMATED COUNT: 22.1 % (ref 11–15)
GLUCOSE BLD-MCNC: 95 MG/DL (ref 70–99)
GLUCOSE UR-MCNC: NEGATIVE MG/DL
HCT VFR BLD AUTO: 29.2 %
HDLC SERPL-MCNC: 69 MG/DL (ref 40–59)
HGB BLD-MCNC: 9.4 G/DL
HGB UR QL STRIP.AUTO: NEGATIVE
IMM GRANULOCYTES # BLD AUTO: 0.03 X10(3) UL (ref 0–1)
IMM GRANULOCYTES NFR BLD: 0.6 %
KETONES UR-MCNC: NEGATIVE MG/DL
LDLC SERPL CALC-MCNC: 46 MG/DL (ref ?–100)
LYMPHOCYTES # BLD AUTO: 1.72 X10(3) UL (ref 1–4)
LYMPHOCYTES NFR BLD AUTO: 36.9 %
MCH RBC QN AUTO: 34.4 PG (ref 26–34)
MCHC RBC AUTO-ENTMCNC: 32.2 G/DL (ref 31–37)
MCV RBC AUTO: 107 FL
MONOCYTES # BLD AUTO: 0.78 X10(3) UL (ref 0.1–1)
MONOCYTES NFR BLD AUTO: 16.7 %
NEUTROPHILS # BLD AUTO: 1.75 X10 (3) UL (ref 1.5–7.7)
NEUTROPHILS # BLD AUTO: 1.75 X10(3) UL (ref 1.5–7.7)
NEUTROPHILS NFR BLD AUTO: 37.7 %
NITRITE UR QL STRIP.AUTO: NEGATIVE
NONHDLC SERPL-MCNC: 55 MG/DL (ref ?–130)
OSMOLALITY SERPL CALC.SUM OF ELEC: 291 MOSM/KG (ref 275–295)
PATIENT FASTING Y/N/NP: YES
PATIENT FASTING Y/N/NP: YES
PH UR: 7 [PH] (ref 5–8)
PLATELET # BLD AUTO: 438 10(3)UL (ref 150–450)
PLATELET MORPHOLOGY: NORMAL
POTASSIUM SERPL-SCNC: 4 MMOL/L (ref 3.5–5.1)
PROT UR-MCNC: NEGATIVE MG/DL
RBC # BLD AUTO: 2.73 X10(6)UL
RBC #/AREA URNS AUTO: 1 /HPF
SODIUM SERPL-SCNC: 139 MMOL/L (ref 136–145)
SP GR UR STRIP: 1.01 (ref 1–1.03)
TRIGL SERPL-MCNC: 43 MG/DL (ref 30–149)
TSI SER-ACNC: 4.49 MIU/ML (ref 0.36–3.74)
UROBILINOGEN UR STRIP-ACNC: <2
VLDLC SERPL CALC-MCNC: 9 MG/DL (ref 0–30)
WBC # BLD AUTO: 4.7 X10(3) UL (ref 4–11)
WBC #/AREA URNS AUTO: 2 /HPF

## 2021-01-20 PROCEDURE — 80048 BASIC METABOLIC PNL TOTAL CA: CPT

## 2021-01-20 PROCEDURE — 81001 URINALYSIS AUTO W/SCOPE: CPT

## 2021-01-20 PROCEDURE — 84443 ASSAY THYROID STIM HORMONE: CPT

## 2021-01-20 PROCEDURE — 84460 ALANINE AMINO (ALT) (SGPT): CPT

## 2021-01-20 PROCEDURE — 80061 LIPID PANEL: CPT

## 2021-01-20 PROCEDURE — 36415 COLL VENOUS BLD VENIPUNCTURE: CPT

## 2021-01-20 PROCEDURE — 85025 COMPLETE CBC W/AUTO DIFF WBC: CPT

## 2021-07-20 ENCOUNTER — LAB ENCOUNTER (OUTPATIENT)
Dept: LAB | Facility: HOSPITAL | Age: 86
End: 2021-07-20
Attending: INTERNAL MEDICINE
Payer: MEDICARE

## 2021-07-20 DIAGNOSIS — N18.30 HYPERTENSIVE HEART DISEASE WITH CONGESTIVE HEART FAILURE AND STAGE 3 KIDNEY DISEASE (HCC): ICD-10-CM

## 2021-07-20 DIAGNOSIS — E78.00 PURE HYPERCHOLESTEROLEMIA: ICD-10-CM

## 2021-07-20 DIAGNOSIS — I13.0 HYPERTENSIVE HEART DISEASE WITH CONGESTIVE HEART FAILURE AND STAGE 3 KIDNEY DISEASE (HCC): ICD-10-CM

## 2021-07-20 DIAGNOSIS — N18.30 CHRONIC KIDNEY DISEASE, STAGE 3 (HCC): ICD-10-CM

## 2021-07-20 DIAGNOSIS — R73.01 IMPAIRED FASTING GLUCOSE: ICD-10-CM

## 2021-07-20 DIAGNOSIS — E83.110 HEREDITARY HEMOCHROMATOSIS (HCC): Primary | ICD-10-CM

## 2021-07-20 DIAGNOSIS — N18.9 CHRONIC KIDNEY DISEASE: ICD-10-CM

## 2021-07-20 DIAGNOSIS — N39.0 UTI (URINARY TRACT INFECTION): ICD-10-CM

## 2021-07-20 LAB
ALBUMIN SERPL-MCNC: 4.2 G/DL (ref 3.4–5)
ALBUMIN/GLOB SERPL: 1.5 {RATIO} (ref 1–2)
ALP LIVER SERPL-CCNC: 37 U/L
ALT SERPL-CCNC: 30 U/L
ANION GAP SERPL CALC-SCNC: 5 MMOL/L (ref 0–18)
AST SERPL-CCNC: 28 U/L (ref 15–37)
BASOPHILS # BLD AUTO: 0.07 X10(3) UL (ref 0–0.2)
BASOPHILS NFR BLD AUTO: 1.8 %
BILIRUB SERPL-MCNC: 1.5 MG/DL (ref 0.1–2)
BILIRUB UR QL: NEGATIVE
BUN BLD-MCNC: 23 MG/DL (ref 7–18)
BUN/CREAT SERPL: 26.4 (ref 10–20)
CALCIUM BLD-MCNC: 8.8 MG/DL (ref 8.5–10.1)
CHLORIDE SERPL-SCNC: 109 MMOL/L (ref 98–112)
CHOLEST SMN-MCNC: 119 MG/DL (ref ?–200)
CLARITY UR: CLEAR
CO2 SERPL-SCNC: 26 MMOL/L (ref 21–32)
COLOR UR: YELLOW
CREAT BLD-MCNC: 0.87 MG/DL
CREAT UR-SCNC: 89.8 MG/DL
DEPRECATED HBV CORE AB SER IA-ACNC: 1090.5 NG/ML
DEPRECATED RDW RBC AUTO: 87.4 FL (ref 35.1–46.3)
EOSINOPHIL # BLD AUTO: 0.18 X10(3) UL (ref 0–0.7)
EOSINOPHIL NFR BLD AUTO: 4.6 %
ERYTHROCYTE [DISTWIDTH] IN BLOOD BY AUTOMATED COUNT: 22.4 % (ref 11–15)
EST. AVERAGE GLUCOSE BLD GHB EST-MCNC: 103 MG/DL (ref 68–126)
GLOBULIN PLAS-MCNC: 2.8 G/DL (ref 2.8–4.4)
GLUCOSE BLD-MCNC: 95 MG/DL (ref 70–99)
GLUCOSE UR-MCNC: NEGATIVE MG/DL
HBA1C MFR BLD HPLC: 5.2 % (ref ?–5.7)
HCT VFR BLD AUTO: 25.7 %
HDLC SERPL-MCNC: 60 MG/DL (ref 40–59)
HGB BLD-MCNC: 8.4 G/DL
HGB UR QL STRIP.AUTO: NEGATIVE
IMM GRANULOCYTES # BLD AUTO: 0.02 X10(3) UL (ref 0–1)
IMM GRANULOCYTES NFR BLD: 0.5 %
KETONES UR-MCNC: NEGATIVE MG/DL
LDLC SERPL CALC-MCNC: 49 MG/DL (ref ?–100)
LEUKOCYTE ESTERASE UR QL STRIP.AUTO: NEGATIVE
LYMPHOCYTES # BLD AUTO: 1.13 X10(3) UL (ref 1–4)
LYMPHOCYTES NFR BLD AUTO: 29.1 %
M PROTEIN MFR SERPL ELPH: 7 G/DL (ref 6.4–8.2)
MCH RBC QN AUTO: 34.6 PG (ref 26–34)
MCHC RBC AUTO-ENTMCNC: 32.7 G/DL (ref 31–37)
MCV RBC AUTO: 105.8 FL
MICROALBUMIN UR-MCNC: 2.05 MG/DL
MICROALBUMIN/CREAT 24H UR-RTO: 22.8 UG/MG (ref ?–30)
MONOCYTES # BLD AUTO: 0.74 X10(3) UL (ref 0.1–1)
MONOCYTES NFR BLD AUTO: 19.1 %
NEUTROPHILS # BLD AUTO: 1.74 X10 (3) UL (ref 1.5–7.7)
NEUTROPHILS # BLD AUTO: 1.74 X10(3) UL (ref 1.5–7.7)
NEUTROPHILS NFR BLD AUTO: 44.9 %
NITRITE UR QL STRIP.AUTO: NEGATIVE
NONHDLC SERPL-MCNC: 59 MG/DL (ref ?–130)
OSMOLALITY SERPL CALC.SUM OF ELEC: 293 MOSM/KG (ref 275–295)
PATIENT FASTING Y/N/NP: YES
PATIENT FASTING Y/N/NP: YES
PH UR: 6 [PH] (ref 5–8)
PLATELET # BLD AUTO: 386 10(3)UL (ref 150–450)
PLATELET MORPHOLOGY: NORMAL
POTASSIUM SERPL-SCNC: 4 MMOL/L (ref 3.5–5.1)
PROT UR-MCNC: NEGATIVE MG/DL
RBC # BLD AUTO: 2.43 X10(6)UL
SODIUM SERPL-SCNC: 140 MMOL/L (ref 136–145)
SP GR UR STRIP: 1.02 (ref 1–1.03)
TRIGL SERPL-MCNC: 37 MG/DL (ref 30–149)
TSI SER-ACNC: 3.42 MIU/ML (ref 0.36–3.74)
UROBILINOGEN UR STRIP-ACNC: <2
VLDLC SERPL CALC-MCNC: 5 MG/DL (ref 0–30)
WBC # BLD AUTO: 3.9 X10(3) UL (ref 4–11)

## 2021-07-20 PROCEDURE — 85025 COMPLETE CBC W/AUTO DIFF WBC: CPT

## 2021-07-20 PROCEDURE — 82728 ASSAY OF FERRITIN: CPT

## 2021-07-20 PROCEDURE — 82043 UR ALBUMIN QUANTITATIVE: CPT

## 2021-07-20 PROCEDURE — 82570 ASSAY OF URINE CREATININE: CPT

## 2021-07-20 PROCEDURE — 80053 COMPREHEN METABOLIC PANEL: CPT

## 2021-07-20 PROCEDURE — 80061 LIPID PANEL: CPT

## 2021-07-20 PROCEDURE — 81003 URINALYSIS AUTO W/O SCOPE: CPT

## 2021-07-20 PROCEDURE — 83036 HEMOGLOBIN GLYCOSYLATED A1C: CPT

## 2021-07-20 PROCEDURE — 36415 COLL VENOUS BLD VENIPUNCTURE: CPT

## 2021-07-20 PROCEDURE — 84443 ASSAY THYROID STIM HORMONE: CPT

## 2021-07-28 ENCOUNTER — OFFICE VISIT (OUTPATIENT)
Dept: HEMATOLOGY/ONCOLOGY | Facility: HOSPITAL | Age: 86
End: 2021-07-28
Attending: INTERNAL MEDICINE
Payer: MEDICARE

## 2021-07-28 VITALS
WEIGHT: 174 LBS | TEMPERATURE: 98 F | OXYGEN SATURATION: 97 % | RESPIRATION RATE: 16 BRPM | BODY MASS INDEX: 24 KG/M2 | DIASTOLIC BLOOD PRESSURE: 55 MMHG | HEART RATE: 74 BPM | SYSTOLIC BLOOD PRESSURE: 142 MMHG

## 2021-07-28 DIAGNOSIS — D64.9 ANEMIA, UNSPECIFIED TYPE: Primary | ICD-10-CM

## 2021-07-28 DIAGNOSIS — E83.19 IRON OVERLOAD: ICD-10-CM

## 2021-07-28 DIAGNOSIS — E83.110 HEREDITARY HEMOCHROMATOSIS (HCC): ICD-10-CM

## 2021-07-28 LAB
DEPRECATED HBV CORE AB SER IA-ACNC: 997.7 NG/ML
DIRECT COOMBS POLY: NEGATIVE
HAPTOGLOB SERPL-MCNC: 29.2 MG/DL (ref 30–200)
IRON SATURATION: 47 %
IRON SERPL-MCNC: 133 UG/DL
LDH SERPL L TO P-CCNC: 197 U/L
TOTAL IRON BINDING CAPACITY: 285 UG/DL (ref 240–450)
TRANSFERRIN SERPL-MCNC: 191 MG/DL (ref 200–360)

## 2021-07-28 PROCEDURE — 36415 COLL VENOUS BLD VENIPUNCTURE: CPT

## 2021-07-28 PROCEDURE — 99214 OFFICE O/P EST MOD 30 MIN: CPT | Performed by: INTERNAL MEDICINE

## 2021-07-28 NOTE — PROGRESS NOTES
Cancer Center Progress Note    Patient Name: Cinthia Davis   YOB: 1931   Medical Record Number: [id number]   Attending Physician: Juma Head M.D.      Chief Complaint:  Hemochromatosis (H63D heterozygous) macrocytic anemia    History of Presen Activity:       Days of Exercise per Week:       Minutes of Exercise per Session:   Stress:       Feeling of Stress :   Social Connections:       Frequency of Communication with Friends and Family:       Frequency of Social Gatherings with Friends and Fami GFRAA 88 07/20/2021    CA 8.8 07/20/2021    OSMOCALC 293 07/20/2021    ALKPHO 37 (L) 07/20/2021    AST 28 07/20/2021    ALT 30 07/20/2021    BILT 1.5 07/20/2021    TP 7.0 07/20/2021    ALB 4.2 07/20/2021    GLOBULIN 2.8 07/20/2021     07/20/2021    K

## 2021-07-29 LAB
ALBUMIN SERPL ELPH-MCNC: 4.54 G/DL (ref 3.75–5.21)
ALBUMIN/GLOB SERPL: 2.01 {RATIO} (ref 1–2)
ALPHA1 GLOB SERPL ELPH-MCNC: 0.25 G/DL (ref 0.19–0.46)
ALPHA2 GLOB SERPL ELPH-MCNC: 0.58 G/DL (ref 0.48–1.05)
B-GLOBULIN SERPL ELPH-MCNC: 0.57 G/DL (ref 0.68–1.23)
GAMMA GLOB SERPL ELPH-MCNC: 0.85 G/DL (ref 0.62–1.7)
KAPPA LC FREE SER-MCNC: 1.82 MG/DL (ref 0.33–1.94)
KAPPA LC FREE/LAMBDA FREE SER NEPH: 0.87 {RATIO} (ref 0.26–1.65)
LAMBDA LC FREE SERPL-MCNC: 2.08 MG/DL (ref 0.57–2.63)
M PROTEIN MFR SERPL ELPH: 6.8 G/DL (ref 6.4–8.2)

## 2022-01-11 ENCOUNTER — LAB ENCOUNTER (OUTPATIENT)
Dept: LAB | Facility: HOSPITAL | Age: 87
End: 2022-01-11
Attending: INTERNAL MEDICINE
Payer: MEDICARE

## 2022-01-11 DIAGNOSIS — E83.110 FAMILIAL HEMOCHROMATOSIS (HCC): Primary | ICD-10-CM

## 2022-01-11 DIAGNOSIS — R73.01 IMPAIRED FASTING GLUCOSE: ICD-10-CM

## 2022-01-11 DIAGNOSIS — E03.9 ACQUIRED HYPOTHYROIDISM: ICD-10-CM

## 2022-01-11 DIAGNOSIS — E78.00 PURE HYPERCHOLESTEROLEMIA: ICD-10-CM

## 2022-01-11 LAB
ALT SERPL-CCNC: 19 U/L
ANION GAP SERPL CALC-SCNC: 4 MMOL/L (ref 0–18)
BASOPHILS # BLD AUTO: 0.1 X10(3) UL (ref 0–0.2)
BASOPHILS NFR BLD AUTO: 2.4 %
BUN BLD-MCNC: 22 MG/DL (ref 7–18)
BUN/CREAT SERPL: 21.4 (ref 10–20)
CALCIUM BLD-MCNC: 8.4 MG/DL (ref 8.5–10.1)
CHLORIDE SERPL-SCNC: 107 MMOL/L (ref 98–112)
CHOLEST SERPL-MCNC: 105 MG/DL (ref ?–200)
CO2 SERPL-SCNC: 28 MMOL/L (ref 21–32)
CREAT BLD-MCNC: 1.03 MG/DL
DEPRECATED HBV CORE AB SER IA-ACNC: 1094.6 NG/ML
DEPRECATED RDW RBC AUTO: 91.6 FL (ref 35.1–46.3)
EOSINOPHIL # BLD AUTO: 0.23 X10(3) UL (ref 0–0.7)
EOSINOPHIL NFR BLD AUTO: 5.5 %
ERYTHROCYTE [DISTWIDTH] IN BLOOD BY AUTOMATED COUNT: 22.4 % (ref 11–15)
EST. AVERAGE GLUCOSE BLD GHB EST-MCNC: 100 MG/DL (ref 68–126)
FASTING PATIENT LIPID ANSWER: YES
FASTING STATUS PATIENT QL REPORTED: YES
GLUCOSE BLD-MCNC: 102 MG/DL (ref 70–99)
HBA1C MFR BLD: 5.1 % (ref ?–5.7)
HCT VFR BLD AUTO: 27 %
HDLC SERPL-MCNC: 60 MG/DL (ref 40–59)
HGB BLD-MCNC: 8.8 G/DL
IMM GRANULOCYTES # BLD AUTO: 0.04 X10(3) UL (ref 0–1)
IMM GRANULOCYTES NFR BLD: 1 %
LDLC SERPL CALC-MCNC: 35 MG/DL (ref ?–100)
LYMPHOCYTES # BLD AUTO: 1.21 X10(3) UL (ref 1–4)
LYMPHOCYTES NFR BLD AUTO: 29.1 %
MCH RBC QN AUTO: 35.8 PG (ref 26–34)
MCHC RBC AUTO-ENTMCNC: 32.6 G/DL (ref 31–37)
MCV RBC AUTO: 109.8 FL
MONOCYTES # BLD AUTO: 0.87 X10(3) UL (ref 0.1–1)
MONOCYTES NFR BLD AUTO: 20.9 %
NEUTROPHILS # BLD AUTO: 1.71 X10 (3) UL (ref 1.5–7.7)
NEUTROPHILS # BLD AUTO: 1.71 X10(3) UL (ref 1.5–7.7)
NEUTROPHILS NFR BLD AUTO: 41.1 %
NONHDLC SERPL-MCNC: 45 MG/DL (ref ?–130)
OSMOLALITY SERPL CALC.SUM OF ELEC: 292 MOSM/KG (ref 275–295)
PLATELET # BLD AUTO: 416 10(3)UL (ref 150–450)
PLATELET MORPHOLOGY: NORMAL
POTASSIUM SERPL-SCNC: 4.2 MMOL/L (ref 3.5–5.1)
RBC # BLD AUTO: 2.46 X10(6)UL
SODIUM SERPL-SCNC: 139 MMOL/L (ref 136–145)
TRIGL SERPL-MCNC: 35 MG/DL (ref 30–149)
TSI SER-ACNC: 5.25 MIU/ML (ref 0.36–3.74)
VLDLC SERPL CALC-MCNC: 5 MG/DL (ref 0–30)
WBC # BLD AUTO: 4.2 X10(3) UL (ref 4–11)

## 2022-01-11 PROCEDURE — 83036 HEMOGLOBIN GLYCOSYLATED A1C: CPT

## 2022-01-11 PROCEDURE — 84443 ASSAY THYROID STIM HORMONE: CPT

## 2022-01-11 PROCEDURE — 82728 ASSAY OF FERRITIN: CPT

## 2022-01-11 PROCEDURE — 80061 LIPID PANEL: CPT

## 2022-01-11 PROCEDURE — 80048 BASIC METABOLIC PNL TOTAL CA: CPT

## 2022-01-11 PROCEDURE — 84460 ALANINE AMINO (ALT) (SGPT): CPT

## 2022-01-11 PROCEDURE — 85025 COMPLETE CBC W/AUTO DIFF WBC: CPT

## 2022-01-11 PROCEDURE — 36415 COLL VENOUS BLD VENIPUNCTURE: CPT

## 2022-08-12 ENCOUNTER — LAB ENCOUNTER (OUTPATIENT)
Dept: LAB | Facility: HOSPITAL | Age: 87
End: 2022-08-12
Attending: INTERNAL MEDICINE
Payer: MEDICARE

## 2022-08-12 DIAGNOSIS — E83.110 PIGMENT CIRRHOSIS (HCC): Primary | ICD-10-CM

## 2022-08-12 DIAGNOSIS — E78.00 PURE HYPERCHOLESTEROLEMIA: ICD-10-CM

## 2022-08-12 DIAGNOSIS — N18.31 CHRONIC KIDNEY DISEASE (CKD) STAGE G3A/A1, MODERATELY DECREASED GLOMERULAR FILTRATION RATE (GFR) BETWEEN 45-59 ML/MIN/1.73 SQUARE METER AND ALBUMINURIA CREATININE RATIO LESS THAN 30 MG/G (HCC): ICD-10-CM

## 2022-08-12 DIAGNOSIS — R35.89 POLYURIA: ICD-10-CM

## 2022-08-12 DIAGNOSIS — R73.01 IMPAIRED FASTING GLUCOSE: ICD-10-CM

## 2022-08-12 LAB
ALBUMIN SERPL-MCNC: 4.2 G/DL (ref 3.4–5)
ALBUMIN/GLOB SERPL: 1.5 {RATIO} (ref 1–2)
ALP LIVER SERPL-CCNC: 38 U/L
ALT SERPL-CCNC: 26 U/L
ANION GAP SERPL CALC-SCNC: 9 MMOL/L (ref 0–18)
AST SERPL-CCNC: 29 U/L (ref 15–37)
BASOPHILS # BLD AUTO: 0.11 X10(3) UL (ref 0–0.2)
BASOPHILS NFR BLD AUTO: 2.5 %
BILIRUB SERPL-MCNC: 1.7 MG/DL (ref 0.1–2)
BILIRUB UR QL: NEGATIVE
BUN BLD-MCNC: 21 MG/DL (ref 7–18)
BUN/CREAT SERPL: 20.8 (ref 10–20)
CALCIUM BLD-MCNC: 9.1 MG/DL (ref 8.5–10.1)
CHLORIDE SERPL-SCNC: 107 MMOL/L (ref 98–112)
CHOLEST SERPL-MCNC: 113 MG/DL (ref ?–200)
CLARITY UR: CLEAR
CO2 SERPL-SCNC: 26 MMOL/L (ref 21–32)
COLOR UR: YELLOW
CREAT BLD-MCNC: 1.01 MG/DL
CREAT UR-SCNC: 89.2 MG/DL
DEPRECATED HBV CORE AB SER IA-ACNC: 1004.1 NG/ML
DEPRECATED RDW RBC AUTO: 91.5 FL (ref 35.1–46.3)
EOSINOPHIL # BLD AUTO: 0.23 X10(3) UL (ref 0–0.7)
EOSINOPHIL NFR BLD AUTO: 5.2 %
ERYTHROCYTE [DISTWIDTH] IN BLOOD BY AUTOMATED COUNT: 23.6 % (ref 11–15)
EST. AVERAGE GLUCOSE BLD GHB EST-MCNC: 103 MG/DL (ref 68–126)
FASTING PATIENT LIPID ANSWER: YES
FASTING STATUS PATIENT QL REPORTED: YES
GFR SERPLBLD BASED ON 1.73 SQ M-ARVRAT: 70 ML/MIN/1.73M2 (ref 60–?)
GLOBULIN PLAS-MCNC: 2.8 G/DL (ref 2.8–4.4)
GLUCOSE BLD-MCNC: 99 MG/DL (ref 70–99)
GLUCOSE UR-MCNC: NEGATIVE MG/DL
HBA1C MFR BLD: 5.2 % (ref ?–5.7)
HCT VFR BLD AUTO: 28.5 %
HDLC SERPL-MCNC: 56 MG/DL (ref 40–59)
HGB BLD-MCNC: 9.4 G/DL
HGB UR QL STRIP.AUTO: NEGATIVE
IMM GRANULOCYTES # BLD AUTO: 0.02 X10(3) UL (ref 0–1)
IMM GRANULOCYTES NFR BLD: 0.5 %
KETONES UR-MCNC: NEGATIVE MG/DL
LDLC SERPL CALC-MCNC: 46 MG/DL (ref ?–100)
LEUKOCYTE ESTERASE UR QL STRIP.AUTO: NEGATIVE
LYMPHOCYTES # BLD AUTO: 1.4 X10(3) UL (ref 1–4)
LYMPHOCYTES NFR BLD AUTO: 31.7 %
MCH RBC QN AUTO: 35.1 PG (ref 26–34)
MCHC RBC AUTO-ENTMCNC: 33 G/DL (ref 31–37)
MCV RBC AUTO: 106.3 FL
MICROALBUMIN UR-MCNC: 2.82 MG/DL
MICROALBUMIN/CREAT 24H UR-RTO: 31.6 UG/MG (ref ?–30)
MONOCYTES # BLD AUTO: 0.85 X10(3) UL (ref 0.1–1)
MONOCYTES NFR BLD AUTO: 19.2 %
NEUTROPHILS # BLD AUTO: 1.81 X10 (3) UL (ref 1.5–7.7)
NEUTROPHILS # BLD AUTO: 1.81 X10(3) UL (ref 1.5–7.7)
NEUTROPHILS NFR BLD AUTO: 40.9 %
NITRITE UR QL STRIP.AUTO: NEGATIVE
NONHDLC SERPL-MCNC: 57 MG/DL (ref ?–130)
OSMOLALITY SERPL CALC.SUM OF ELEC: 297 MOSM/KG (ref 275–295)
PH UR: 6 [PH] (ref 5–8)
PLATELET # BLD AUTO: 405 10(3)UL (ref 150–450)
PLATELET MORPHOLOGY: NORMAL
POTASSIUM SERPL-SCNC: 4 MMOL/L (ref 3.5–5.1)
PROT SERPL-MCNC: 7 G/DL (ref 6.4–8.2)
PROT UR-MCNC: NEGATIVE MG/DL
RBC # BLD AUTO: 2.68 X10(6)UL
SODIUM SERPL-SCNC: 142 MMOL/L (ref 136–145)
SP GR UR STRIP: 1.02 (ref 1–1.03)
TRIGL SERPL-MCNC: 46 MG/DL (ref 30–149)
TSI SER-ACNC: 4.67 MIU/ML (ref 0.36–3.74)
UROBILINOGEN UR STRIP-ACNC: 0.2
VLDLC SERPL CALC-MCNC: 6 MG/DL (ref 0–30)
WBC # BLD AUTO: 4.4 X10(3) UL (ref 4–11)

## 2022-08-12 PROCEDURE — 82570 ASSAY OF URINE CREATININE: CPT

## 2022-08-12 PROCEDURE — 83036 HEMOGLOBIN GLYCOSYLATED A1C: CPT

## 2022-08-12 PROCEDURE — 82728 ASSAY OF FERRITIN: CPT

## 2022-08-12 PROCEDURE — 82043 UR ALBUMIN QUANTITATIVE: CPT

## 2022-08-12 PROCEDURE — 80053 COMPREHEN METABOLIC PANEL: CPT

## 2022-08-12 PROCEDURE — 36415 COLL VENOUS BLD VENIPUNCTURE: CPT

## 2022-08-12 PROCEDURE — 81003 URINALYSIS AUTO W/O SCOPE: CPT

## 2022-08-12 PROCEDURE — 80061 LIPID PANEL: CPT

## 2022-08-12 PROCEDURE — 85025 COMPLETE CBC W/AUTO DIFF WBC: CPT

## 2022-08-12 PROCEDURE — 84443 ASSAY THYROID STIM HORMONE: CPT

## 2022-08-15 DIAGNOSIS — R09.89 RIGHT CAROTID BRUIT: Primary | ICD-10-CM

## 2022-09-13 ENCOUNTER — HOSPITAL ENCOUNTER (OUTPATIENT)
Dept: ULTRASOUND IMAGING | Facility: HOSPITAL | Age: 87
Discharge: HOME | End: 2022-09-13
Attending: INTERNAL MEDICINE
Payer: MEDICARE

## 2022-09-13 DIAGNOSIS — R09.89 RIGHT CAROTID BRUIT: ICD-10-CM

## 2022-09-13 PROCEDURE — 93880 EXTRACRANIAL BILAT STUDY: CPT | Performed by: INTERNAL MEDICINE

## 2022-09-29 ENCOUNTER — HOSPITAL ENCOUNTER (OUTPATIENT)
Dept: CV DIAGNOSTICS | Facility: HOSPITAL | Age: 87
Discharge: HOME | End: 2022-09-29
Attending: THORACIC SURGERY (CARDIOTHORACIC VASCULAR SURGERY)
Payer: MEDICARE

## 2022-09-29 ENCOUNTER — HOSPITAL ENCOUNTER (OUTPATIENT)
Dept: NUCLEAR MEDICINE | Facility: HOSPITAL | Age: 87
Discharge: HOME | End: 2022-09-29
Attending: THORACIC SURGERY (CARDIOTHORACIC VASCULAR SURGERY)
Payer: MEDICARE

## 2022-09-29 ENCOUNTER — HOSPITAL ENCOUNTER (OUTPATIENT)
Dept: CT IMAGING | Facility: HOSPITAL | Age: 87
Discharge: HOME | End: 2022-09-29
Attending: THORACIC SURGERY (CARDIOTHORACIC VASCULAR SURGERY)
Payer: MEDICARE

## 2022-09-29 DIAGNOSIS — I65.21 STENOSIS OF RIGHT CAROTID ARTERY: ICD-10-CM

## 2022-09-29 LAB
CREAT BLD-MCNC: 1.1 MG/DL
GFR SERPLBLD BASED ON 1.73 SQ M-ARVRAT: 63 ML/MIN/1.73M2 (ref 60–?)

## 2022-09-29 PROCEDURE — 93018 CV STRESS TEST I&R ONLY: CPT | Performed by: THORACIC SURGERY (CARDIOTHORACIC VASCULAR SURGERY)

## 2022-09-29 PROCEDURE — 93016 CV STRESS TEST SUPVJ ONLY: CPT | Performed by: THORACIC SURGERY (CARDIOTHORACIC VASCULAR SURGERY)

## 2022-09-29 PROCEDURE — 93017 CV STRESS TEST TRACING ONLY: CPT | Performed by: THORACIC SURGERY (CARDIOTHORACIC VASCULAR SURGERY)

## 2022-09-29 PROCEDURE — 70498 CT ANGIOGRAPHY NECK: CPT | Performed by: THORACIC SURGERY (CARDIOTHORACIC VASCULAR SURGERY)

## 2022-09-29 PROCEDURE — 78452 HT MUSCLE IMAGE SPECT MULT: CPT | Performed by: THORACIC SURGERY (CARDIOTHORACIC VASCULAR SURGERY)

## 2022-09-29 PROCEDURE — 82565 ASSAY OF CREATININE: CPT

## 2022-11-17 ENCOUNTER — OFFICE VISIT (OUTPATIENT)
Dept: OTOLARYNGOLOGY | Facility: CLINIC | Age: 87
End: 2022-11-17
Payer: MEDICARE

## 2022-11-17 VITALS — TEMPERATURE: 97 F | WEIGHT: 174 LBS | BODY MASS INDEX: 24 KG/M2

## 2022-11-17 DIAGNOSIS — H61.23 CERUMEN DEBRIS ON TYMPANIC MEMBRANE OF BOTH EARS: Primary | ICD-10-CM

## 2022-11-17 PROCEDURE — 69210 REMOVE IMPACTED EAR WAX UNI: CPT | Performed by: SPECIALIST

## 2023-02-15 ENCOUNTER — LAB ENCOUNTER (OUTPATIENT)
Dept: LAB | Facility: HOSPITAL | Age: 88
End: 2023-02-15
Attending: INTERNAL MEDICINE
Payer: MEDICARE

## 2023-02-15 DIAGNOSIS — E03.9 HYPOTHYROIDISM: ICD-10-CM

## 2023-02-15 DIAGNOSIS — R73.01 IFG (IMPAIRED FASTING GLUCOSE): ICD-10-CM

## 2023-02-15 DIAGNOSIS — D64.9 ANEMIA: ICD-10-CM

## 2023-02-15 DIAGNOSIS — E78.00 PURE HYPERCHOLESTEROLEMIA: Primary | ICD-10-CM

## 2023-02-15 LAB
ALT SERPL-CCNC: 42 U/L
ANION GAP SERPL CALC-SCNC: 5 MMOL/L (ref 0–18)
BASOPHILS # BLD AUTO: 0.11 X10(3) UL (ref 0–0.2)
BASOPHILS NFR BLD AUTO: 2.6 %
BUN BLD-MCNC: 27 MG/DL (ref 7–18)
BUN/CREAT SERPL: 26.2 (ref 10–20)
CALCIUM BLD-MCNC: 9.1 MG/DL (ref 8.5–10.1)
CHLORIDE SERPL-SCNC: 109 MMOL/L (ref 98–112)
CHOLEST SERPL-MCNC: 99 MG/DL (ref ?–200)
CO2 SERPL-SCNC: 27 MMOL/L (ref 21–32)
CREAT BLD-MCNC: 1.03 MG/DL
DEPRECATED HBV CORE AB SER IA-ACNC: 1464.2 NG/ML
DEPRECATED RDW RBC AUTO: 92.3 FL (ref 35.1–46.3)
EOSINOPHIL # BLD AUTO: 0.18 X10(3) UL (ref 0–0.7)
EOSINOPHIL NFR BLD AUTO: 4.2 %
ERYTHROCYTE [DISTWIDTH] IN BLOOD BY AUTOMATED COUNT: 24.1 % (ref 11–15)
EST. AVERAGE GLUCOSE BLD GHB EST-MCNC: 100 MG/DL (ref 68–126)
FASTING PATIENT LIPID ANSWER: YES
FASTING STATUS PATIENT QL REPORTED: YES
GFR SERPLBLD BASED ON 1.73 SQ M-ARVRAT: 69 ML/MIN/1.73M2 (ref 60–?)
GLUCOSE BLD-MCNC: 105 MG/DL (ref 70–99)
HBA1C MFR BLD: 5.1 % (ref ?–5.7)
HCT VFR BLD AUTO: 28.8 %
HDLC SERPL-MCNC: 60 MG/DL (ref 40–59)
HGB BLD-MCNC: 9.6 G/DL
IMM GRANULOCYTES # BLD AUTO: 0.03 X10(3) UL (ref 0–1)
IMM GRANULOCYTES NFR BLD: 0.7 %
LDLC SERPL CALC-MCNC: 29 MG/DL (ref ?–100)
LYMPHOCYTES # BLD AUTO: 1.11 X10(3) UL (ref 1–4)
LYMPHOCYTES NFR BLD AUTO: 25.9 %
MCH RBC QN AUTO: 34.7 PG (ref 26–34)
MCHC RBC AUTO-ENTMCNC: 33.3 G/DL (ref 31–37)
MCV RBC AUTO: 104 FL
MONOCYTES # BLD AUTO: 0.84 X10(3) UL (ref 0.1–1)
MONOCYTES NFR BLD AUTO: 19.6 %
NEUTROPHILS # BLD AUTO: 2.02 X10 (3) UL (ref 1.5–7.7)
NEUTROPHILS # BLD AUTO: 2.02 X10(3) UL (ref 1.5–7.7)
NEUTROPHILS NFR BLD AUTO: 47 %
NONHDLC SERPL-MCNC: 39 MG/DL (ref ?–130)
OSMOLALITY SERPL CALC.SUM OF ELEC: 297 MOSM/KG (ref 275–295)
PLATELET # BLD AUTO: 360 10(3)UL (ref 150–450)
PLATELET MORPHOLOGY: NORMAL
POTASSIUM SERPL-SCNC: 4 MMOL/L (ref 3.5–5.1)
RBC # BLD AUTO: 2.77 X10(6)UL
SODIUM SERPL-SCNC: 141 MMOL/L (ref 136–145)
TRIGL SERPL-MCNC: 32 MG/DL (ref 30–149)
TSI SER-ACNC: 4.09 MIU/ML (ref 0.36–3.74)
VLDLC SERPL CALC-MCNC: 4 MG/DL (ref 0–30)
WBC # BLD AUTO: 4.3 X10(3) UL (ref 4–11)

## 2023-02-15 PROCEDURE — 80061 LIPID PANEL: CPT

## 2023-02-15 PROCEDURE — 84443 ASSAY THYROID STIM HORMONE: CPT

## 2023-02-15 PROCEDURE — 82728 ASSAY OF FERRITIN: CPT

## 2023-02-15 PROCEDURE — 36415 COLL VENOUS BLD VENIPUNCTURE: CPT

## 2023-02-15 PROCEDURE — 85025 COMPLETE CBC W/AUTO DIFF WBC: CPT

## 2023-02-15 PROCEDURE — 80048 BASIC METABOLIC PNL TOTAL CA: CPT

## 2023-02-15 PROCEDURE — 84460 ALANINE AMINO (ALT) (SGPT): CPT

## 2023-02-15 PROCEDURE — 83036 HEMOGLOBIN GLYCOSYLATED A1C: CPT

## 2023-02-21 ENCOUNTER — OFFICE VISIT (OUTPATIENT)
Dept: HEMATOLOGY/ONCOLOGY | Facility: HOSPITAL | Age: 88
End: 2023-02-21
Attending: INTERNAL MEDICINE
Payer: MEDICARE

## 2023-02-21 VITALS
OXYGEN SATURATION: 96 % | WEIGHT: 168 LBS | BODY MASS INDEX: 23 KG/M2 | DIASTOLIC BLOOD PRESSURE: 78 MMHG | TEMPERATURE: 99 F | HEART RATE: 88 BPM | SYSTOLIC BLOOD PRESSURE: 137 MMHG | RESPIRATION RATE: 18 BRPM

## 2023-02-21 DIAGNOSIS — E83.110 HEREDITARY HEMOCHROMATOSIS (HCC): ICD-10-CM

## 2023-02-21 DIAGNOSIS — D64.9 ANEMIA, UNSPECIFIED TYPE: Primary | ICD-10-CM

## 2023-02-21 PROCEDURE — 99214 OFFICE O/P EST MOD 30 MIN: CPT | Performed by: INTERNAL MEDICINE

## 2023-02-21 RX ORDER — DONEPEZIL HYDROCHLORIDE 5 MG/1
5 TABLET, FILM COATED ORAL NIGHTLY
COMMUNITY

## 2023-03-15 ENCOUNTER — OFFICE VISIT (OUTPATIENT)
Dept: AUDIOLOGY | Facility: CLINIC | Age: 88
End: 2023-03-15

## 2023-03-15 DIAGNOSIS — H90.3 SENSORINEURAL HEARING LOSS, BILATERAL: Primary | ICD-10-CM

## 2023-03-15 PROCEDURE — 92557 COMPREHENSIVE HEARING TEST: CPT | Performed by: AUDIOLOGIST

## 2023-03-15 PROCEDURE — 92567 TYMPANOMETRY: CPT | Performed by: AUDIOLOGIST

## 2023-06-26 ENCOUNTER — LAB ENCOUNTER (OUTPATIENT)
Dept: LAB | Facility: HOSPITAL | Age: 88
End: 2023-06-26
Attending: INTERNAL MEDICINE
Payer: MEDICARE

## 2023-06-26 DIAGNOSIS — R35.89 OTHER POLYURIA: Primary | ICD-10-CM

## 2023-06-26 LAB
BILIRUB UR QL: NEGATIVE
CLARITY UR: CLEAR
GLUCOSE UR-MCNC: NORMAL MG/DL
HGB UR QL STRIP.AUTO: NEGATIVE
KETONES UR-MCNC: NEGATIVE MG/DL
LEUKOCYTE ESTERASE UR QL STRIP.AUTO: NEGATIVE
NITRITE UR QL STRIP.AUTO: NEGATIVE
PH UR: 5.5 [PH] (ref 5–8)
SP GR UR STRIP: 1.01 (ref 1–1.03)
UROBILINOGEN UR STRIP-ACNC: NORMAL

## 2023-06-26 PROCEDURE — 81001 URINALYSIS AUTO W/SCOPE: CPT

## 2023-07-18 ENCOUNTER — SURGERY CENTER DOCUMENTATION (OUTPATIENT)
Dept: SURGERY | Age: 88
End: 2023-07-18

## 2023-07-18 ENCOUNTER — LAB REQUISITION (OUTPATIENT)
Dept: SURGERY | Age: 88
End: 2023-07-18
Payer: MEDICARE

## 2023-07-18 DIAGNOSIS — L98.9 FIBROHISTIOCYTIC PROLIFERATION OF THE SKIN: ICD-10-CM

## 2023-07-18 PROCEDURE — 88305 TISSUE EXAM BY PATHOLOGIST: CPT | Performed by: SURGERY

## 2023-07-18 NOTE — PROCEDURES
[facility] OUTPATIENT SURGICAL CENTER OPERATIVE REPORT:     PATIENT NAME: Merlin Michael  : 3/14/1931   MRN: [id number]  SITE: [facility]      DATE OF OPERATION: 23        PREOPERATIVE DIAGNOSIS: Ulcerated skin lesion of left temporal/eye region      POSTOPERATIVE DIAGNOSIS: same     PROCEDURE PERFORMED:   1. Wide excision of ulcerated skin lesion of left temporal/eye region   measuring 3.2 cm x 1.2 cm with intermediate closure x 3.5 cm     SURGEON:  Aislinn Morocho MD.    ASSISTANT: Juliana Echevarria PA-C  (Assistant helped position patient and helped with positioning, retraction, suturing, closure, dressings etc.)      ANESTHESIA: MAC      ESTIMATED BLOOD LOSS:   2 ml    COMPLICATIONS: none    INDICATIONS:  This is a 80year old male with a symptomatic ulcerated skin lesion involving the left temporal/region which is increasing in size. I had a lengthy discussion with the patient  as to the etiology of the above. I discussed options of continued observation versus surgical excision. The risks of the procedure including bleeding, infection, postoperative hematoma, seroma formation, paresthesia over the excision site, recurrent mass formation, cosmetic changes involving the incision site as well as risks with anesthesia with discussed in detail with the patient   who understand, consent, and wish to proceed with the operation    OPERATION:  The patient was brought to the operating room and placed on the operating room in the supine position. MAC anesthetic was administered via       by anesthesia. The patient was placed in the  Supine  position padding all pressure points. The region overlying the left temporal/eye region ulcerated lesion was prepped and draped in the usual sterile fashion. Next, 0.25% Marcaine with epinephrine was used to anesthetize the skin surrounding the mass. A linear incision was made with a #10 blade. Dissection continued down through the subcutaneous tissues using electrocautery.   An ulcerated skin lesion was identified and completely excised undulations of the fat of the face. Skin lesion was was completely removed and was approximately 3.2 cm X 1.2cm in size. The specimen was sent to Pathology. Hemostasis was obtained using electrocautery. There was no residual mass present. A two-layered intermediate closure was performed. The total length of the incision was 3.5 cm in size. The subcutaneous tissue was anesthetized with local anesthetic. Subcutaneous tissue was reapproximated with 3-0 Vicryl simple interrupted sutures. The skin was approximated with 4-0 Vicryl subcuticular suture. Dermabond was used to reapproximate the skin edges. The patient was transferred off the operating room table to the recovery room extubated in stable condition. All counts were correct at the end of the case. The role of my assistant it was critical the operation.   They actived in positioning the patient, exposure, retraction, and closure of the wound            [address] [facility]    7/18/2023  12:49 PM  Magdy Garcia MD

## 2023-08-28 ENCOUNTER — LAB ENCOUNTER (OUTPATIENT)
Dept: LAB | Facility: HOSPITAL | Age: 88
End: 2023-08-28
Attending: INTERNAL MEDICINE
Payer: MEDICARE

## 2023-08-28 DIAGNOSIS — E83.110 PIGMENT CIRRHOSIS (HCC): Primary | ICD-10-CM

## 2023-08-28 DIAGNOSIS — E78.00 PURE HYPERCHOLESTEROLEMIA: ICD-10-CM

## 2023-08-28 LAB
ALBUMIN SERPL-MCNC: 4.2 G/DL (ref 3.4–5)
ALBUMIN/GLOB SERPL: 1.4 {RATIO} (ref 1–2)
ALP LIVER SERPL-CCNC: 43 U/L
ALT SERPL-CCNC: 29 U/L
ANION GAP SERPL CALC-SCNC: 6 MMOL/L (ref 0–18)
AST SERPL-CCNC: 35 U/L (ref 15–37)
BASOPHILS # BLD AUTO: 0.1 X10(3) UL (ref 0–0.2)
BASOPHILS NFR BLD AUTO: 2.3 %
BILIRUB SERPL-MCNC: 1.9 MG/DL (ref 0.1–2)
BUN BLD-MCNC: 20 MG/DL (ref 7–18)
BUN/CREAT SERPL: 17.1 (ref 10–20)
CALCIUM BLD-MCNC: 9.1 MG/DL (ref 8.5–10.1)
CHLORIDE SERPL-SCNC: 107 MMOL/L (ref 98–112)
CHOLEST SERPL-MCNC: 97 MG/DL (ref ?–200)
CO2 SERPL-SCNC: 26 MMOL/L (ref 21–32)
CREAT BLD-MCNC: 1.17 MG/DL
DEPRECATED HBV CORE AB SER IA-ACNC: 1213.4 NG/ML
DEPRECATED RDW RBC AUTO: 93 FL (ref 35.1–46.3)
EGFRCR SERPLBLD CKD-EPI 2021: 58 ML/MIN/1.73M2 (ref 60–?)
EOSINOPHIL # BLD AUTO: 0.08 X10(3) UL (ref 0–0.7)
EOSINOPHIL NFR BLD AUTO: 1.8 %
ERYTHROCYTE [DISTWIDTH] IN BLOOD BY AUTOMATED COUNT: 24.6 % (ref 11–15)
FASTING PATIENT LIPID ANSWER: NO
FASTING STATUS PATIENT QL REPORTED: NO
GLOBULIN PLAS-MCNC: 2.9 G/DL (ref 2.8–4.4)
GLUCOSE BLD-MCNC: 78 MG/DL (ref 70–99)
HCT VFR BLD AUTO: 28.4 %
HDLC SERPL-MCNC: 58 MG/DL (ref 40–59)
HGB BLD-MCNC: 9.4 G/DL
IMM GRANULOCYTES # BLD AUTO: 0.03 X10(3) UL (ref 0–1)
IMM GRANULOCYTES NFR BLD: 0.7 %
LDLC SERPL CALC-MCNC: 27 MG/DL (ref ?–100)
LYMPHOCYTES # BLD AUTO: 0.89 X10(3) UL (ref 1–4)
LYMPHOCYTES NFR BLD AUTO: 20.3 %
MCH RBC QN AUTO: 34.2 PG (ref 26–34)
MCHC RBC AUTO-ENTMCNC: 33.1 G/DL (ref 31–37)
MCV RBC AUTO: 103.3 FL
MONOCYTES # BLD AUTO: 0.87 X10(3) UL (ref 0.1–1)
MONOCYTES NFR BLD AUTO: 19.9 %
NEUTROPHILS # BLD AUTO: 2.41 X10 (3) UL (ref 1.5–7.7)
NEUTROPHILS # BLD AUTO: 2.41 X10(3) UL (ref 1.5–7.7)
NEUTROPHILS NFR BLD AUTO: 55 %
NONHDLC SERPL-MCNC: 39 MG/DL (ref ?–130)
OSMOLALITY SERPL CALC.SUM OF ELEC: 289 MOSM/KG (ref 275–295)
PLATELET # BLD AUTO: 357 10(3)UL (ref 150–450)
PLATELET MORPHOLOGY: NORMAL
POTASSIUM SERPL-SCNC: 4.4 MMOL/L (ref 3.5–5.1)
PROT SERPL-MCNC: 7.1 G/DL (ref 6.4–8.2)
RBC # BLD AUTO: 2.75 X10(6)UL
SODIUM SERPL-SCNC: 139 MMOL/L (ref 136–145)
TRIGL SERPL-MCNC: 44 MG/DL (ref 30–149)
TSI SER-ACNC: 3.79 MIU/ML (ref 0.36–3.74)
VLDLC SERPL CALC-MCNC: 6 MG/DL (ref 0–30)
WBC # BLD AUTO: 4.4 X10(3) UL (ref 4–11)

## 2023-08-28 PROCEDURE — 82728 ASSAY OF FERRITIN: CPT

## 2023-08-28 PROCEDURE — 85025 COMPLETE CBC W/AUTO DIFF WBC: CPT

## 2023-08-28 PROCEDURE — 80053 COMPREHEN METABOLIC PANEL: CPT

## 2023-08-28 PROCEDURE — 84443 ASSAY THYROID STIM HORMONE: CPT

## 2023-08-28 PROCEDURE — 80061 LIPID PANEL: CPT

## 2023-08-28 PROCEDURE — 36415 COLL VENOUS BLD VENIPUNCTURE: CPT

## 2024-02-17 ENCOUNTER — LAB ENCOUNTER (OUTPATIENT)
Dept: LAB | Facility: HOSPITAL | Age: 89
End: 2024-02-17
Attending: INTERNAL MEDICINE
Payer: MEDICARE

## 2024-02-17 DIAGNOSIS — R35.89 OTHER POLYURIA: ICD-10-CM

## 2024-02-17 DIAGNOSIS — E78.00 PURE HYPERCHOLESTEROLEMIA: ICD-10-CM

## 2024-02-17 DIAGNOSIS — N18.31 CHRONIC KIDNEY DISEASE, STAGE 3A (HCC): ICD-10-CM

## 2024-02-17 DIAGNOSIS — R73.01 IMPAIRED FASTING GLUCOSE: ICD-10-CM

## 2024-02-17 DIAGNOSIS — E83.110 HEREDITARY HEMOCHROMATOSIS (HCC): Primary | ICD-10-CM

## 2024-02-17 LAB
ALBUMIN SERPL-MCNC: 4.4 G/DL (ref 3.2–4.8)
ALBUMIN/GLOB SERPL: 1.8 {RATIO} (ref 1–2)
ALP LIVER SERPL-CCNC: 40 U/L
ALT SERPL-CCNC: 16 U/L
ANION GAP SERPL CALC-SCNC: 6 MMOL/L (ref 0–18)
AST SERPL-CCNC: 28 U/L (ref ?–34)
BASOPHILS # BLD AUTO: 0.13 X10(3) UL (ref 0–0.2)
BASOPHILS NFR BLD AUTO: 3.4 %
BILIRUB SERPL-MCNC: 2 MG/DL (ref 0.2–0.9)
BILIRUB UR QL: NEGATIVE
BUN BLD-MCNC: 26 MG/DL (ref 9–23)
BUN/CREAT SERPL: 25.7 (ref 10–20)
CALCIUM BLD-MCNC: 9.2 MG/DL (ref 8.7–10.4)
CHLORIDE SERPL-SCNC: 108 MMOL/L (ref 98–112)
CHOLEST SERPL-MCNC: 114 MG/DL (ref ?–200)
CLARITY UR: CLEAR
CO2 SERPL-SCNC: 27 MMOL/L (ref 21–32)
CREAT BLD-MCNC: 1.01 MG/DL
CREAT UR-SCNC: 75.6 MG/DL
DEPRECATED HBV CORE AB SER IA-ACNC: 1107.4 NG/ML
DEPRECATED RDW RBC AUTO: 91.3 FL (ref 35.1–46.3)
EGFRCR SERPLBLD CKD-EPI 2021: 70 ML/MIN/1.73M2 (ref 60–?)
EOSINOPHIL # BLD AUTO: 0.13 X10(3) UL (ref 0–0.7)
EOSINOPHIL NFR BLD AUTO: 3.4 %
ERYTHROCYTE [DISTWIDTH] IN BLOOD BY AUTOMATED COUNT: 24.2 % (ref 11–15)
EST. AVERAGE GLUCOSE BLD GHB EST-MCNC: 108 MG/DL (ref 68–126)
FASTING PATIENT LIPID ANSWER: YES
FASTING STATUS PATIENT QL REPORTED: YES
GLOBULIN PLAS-MCNC: 2.5 G/DL (ref 2.8–4.4)
GLUCOSE BLD-MCNC: 94 MG/DL (ref 70–99)
GLUCOSE UR-MCNC: NORMAL MG/DL
HBA1C MFR BLD: 5.4 % (ref ?–5.7)
HCT VFR BLD AUTO: 26.6 %
HDLC SERPL-MCNC: 56 MG/DL (ref 40–59)
HGB BLD-MCNC: 9 G/DL
IMM GRANULOCYTES # BLD AUTO: 0.04 X10(3) UL (ref 0–1)
IMM GRANULOCYTES NFR BLD: 1 %
KETONES UR-MCNC: NEGATIVE MG/DL
LDLC SERPL CALC-MCNC: 48 MG/DL (ref ?–100)
LEUKOCYTE ESTERASE UR QL STRIP.AUTO: 25
LYMPHOCYTES # BLD AUTO: 0.88 X10(3) UL (ref 1–4)
LYMPHOCYTES NFR BLD AUTO: 22.7 %
MCH RBC QN AUTO: 34.5 PG (ref 26–34)
MCHC RBC AUTO-ENTMCNC: 33.8 G/DL (ref 31–37)
MCV RBC AUTO: 101.9 FL
MICROALBUMIN UR-MCNC: 5.2 MG/DL
MICROALBUMIN/CREAT 24H UR-RTO: 68.8 UG/MG (ref ?–30)
MONOCYTES # BLD AUTO: 0.92 X10(3) UL (ref 0.1–1)
MONOCYTES NFR BLD AUTO: 23.7 %
NEUTROPHILS # BLD AUTO: 1.78 X10 (3) UL (ref 1.5–7.7)
NEUTROPHILS # BLD AUTO: 1.78 X10(3) UL (ref 1.5–7.7)
NEUTROPHILS NFR BLD AUTO: 45.8 %
NITRITE UR QL STRIP.AUTO: NEGATIVE
NONHDLC SERPL-MCNC: 58 MG/DL (ref ?–130)
OSMOLALITY SERPL CALC.SUM OF ELEC: 297 MOSM/KG (ref 275–295)
PH UR: 6 [PH] (ref 5–8)
PLATELET # BLD AUTO: 322 10(3)UL (ref 150–450)
PLATELET MORPHOLOGY: NORMAL
POTASSIUM SERPL-SCNC: 4.2 MMOL/L (ref 3.5–5.1)
PROT SERPL-MCNC: 6.9 G/DL (ref 5.7–8.2)
RBC # BLD AUTO: 2.61 X10(6)UL
SODIUM SERPL-SCNC: 141 MMOL/L (ref 136–145)
SP GR UR STRIP: 1.02 (ref 1–1.03)
TRIGL SERPL-MCNC: 36 MG/DL (ref 30–149)
TSI SER-ACNC: 5.81 MIU/ML (ref 0.55–4.78)
UROBILINOGEN UR STRIP-ACNC: NORMAL
VLDLC SERPL CALC-MCNC: 5 MG/DL (ref 0–30)
WBC # BLD AUTO: 3.9 X10(3) UL (ref 4–11)

## 2024-02-17 PROCEDURE — 83036 HEMOGLOBIN GLYCOSYLATED A1C: CPT

## 2024-02-17 PROCEDURE — 82043 UR ALBUMIN QUANTITATIVE: CPT

## 2024-02-17 PROCEDURE — 80061 LIPID PANEL: CPT

## 2024-02-17 PROCEDURE — 82728 ASSAY OF FERRITIN: CPT

## 2024-02-17 PROCEDURE — 80053 COMPREHEN METABOLIC PANEL: CPT

## 2024-02-17 PROCEDURE — 85025 COMPLETE CBC W/AUTO DIFF WBC: CPT

## 2024-02-17 PROCEDURE — 84443 ASSAY THYROID STIM HORMONE: CPT

## 2024-02-17 PROCEDURE — 36415 COLL VENOUS BLD VENIPUNCTURE: CPT

## 2024-02-17 PROCEDURE — 82570 ASSAY OF URINE CREATININE: CPT

## 2024-02-17 PROCEDURE — 81001 URINALYSIS AUTO W/SCOPE: CPT

## 2024-02-26 ENCOUNTER — LAB ENCOUNTER (OUTPATIENT)
Dept: LAB | Facility: HOSPITAL | Age: 89
End: 2024-02-26
Attending: INTERNAL MEDICINE
Payer: MEDICARE

## 2024-02-26 DIAGNOSIS — R31.29 MICROSCOPIC HEMATURIA: Primary | ICD-10-CM

## 2024-02-26 DIAGNOSIS — R00.2 PALPITATIONS: ICD-10-CM

## 2024-02-26 LAB
Q-T INTERVAL: 450 MS
QRS DURATION: 150 MS
QTC CALCULATION (BEZET): 468 MS
R AXIS: -67 DEGREES
T AXIS: 34 DEGREES
VENTRICULAR RATE: 65 BPM

## 2024-02-26 PROCEDURE — 81015 MICROSCOPIC EXAM OF URINE: CPT

## 2024-02-26 PROCEDURE — 93010 ELECTROCARDIOGRAM REPORT: CPT | Performed by: INTERNAL MEDICINE

## 2024-02-26 PROCEDURE — 93005 ELECTROCARDIOGRAM TRACING: CPT

## 2024-05-03 ENCOUNTER — LAB ENCOUNTER (OUTPATIENT)
Dept: LAB | Facility: HOSPITAL | Age: 89
End: 2024-05-03
Attending: INTERNAL MEDICINE
Payer: MEDICARE

## 2024-05-03 DIAGNOSIS — E03.9 ACQUIRED HYPOTHYROIDISM: ICD-10-CM

## 2024-05-03 DIAGNOSIS — R53.83 FATIGUE: Primary | ICD-10-CM

## 2024-05-03 DIAGNOSIS — N18.31 CHRONIC KIDNEY DISEASE (CKD) STAGE G3A/A1, MODERATELY DECREASED GLOMERULAR FILTRATION RATE (GFR) BETWEEN 45-59 ML/MIN/1.73 SQUARE METER AND ALBUMINURIA CREATININE RATIO LESS THAN 30 MG/G (HCC): ICD-10-CM

## 2024-05-03 LAB
ANION GAP SERPL CALC-SCNC: 6 MMOL/L (ref 0–18)
BUN BLD-MCNC: 29 MG/DL (ref 9–23)
BUN/CREAT SERPL: 26.9 (ref 10–20)
CALCIUM BLD-MCNC: 9.4 MG/DL (ref 8.7–10.4)
CHLORIDE SERPL-SCNC: 107 MMOL/L (ref 98–112)
CO2 SERPL-SCNC: 28 MMOL/L (ref 21–32)
CREAT BLD-MCNC: 1.08 MG/DL
EGFRCR SERPLBLD CKD-EPI 2021: 64 ML/MIN/1.73M2 (ref 60–?)
FASTING STATUS PATIENT QL REPORTED: NO
GLUCOSE BLD-MCNC: 87 MG/DL (ref 70–99)
OSMOLALITY SERPL CALC.SUM OF ELEC: 297 MOSM/KG (ref 275–295)
POTASSIUM SERPL-SCNC: 4.1 MMOL/L (ref 3.5–5.1)
SODIUM SERPL-SCNC: 141 MMOL/L (ref 136–145)
TSI SER-ACNC: 2.67 MIU/ML (ref 0.55–4.78)

## 2024-05-03 PROCEDURE — 80048 BASIC METABOLIC PNL TOTAL CA: CPT

## 2024-05-03 PROCEDURE — 36415 COLL VENOUS BLD VENIPUNCTURE: CPT

## 2024-05-03 PROCEDURE — 84443 ASSAY THYROID STIM HORMONE: CPT

## 2024-06-05 ENCOUNTER — LAB ENCOUNTER (OUTPATIENT)
Dept: LAB | Facility: HOSPITAL | Age: 89
End: 2024-06-05
Attending: INTERNAL MEDICINE
Payer: MEDICARE

## 2024-06-05 DIAGNOSIS — N30.90 CYSTITIS WITHOUT HEMATURIA: Primary | ICD-10-CM

## 2024-06-05 LAB
BILIRUB UR QL: NEGATIVE
CLARITY UR: CLEAR
GLUCOSE UR-MCNC: NORMAL MG/DL
KETONES UR-MCNC: NEGATIVE MG/DL
LEUKOCYTE ESTERASE UR QL STRIP.AUTO: 25
NITRITE UR QL STRIP.AUTO: NEGATIVE
PH UR: 5.5 [PH] (ref 5–8)
PROT UR-MCNC: NEGATIVE MG/DL
RBC #/AREA URNS AUTO: >10 /HPF
SP GR UR STRIP: 1.01 (ref 1–1.03)
UROBILINOGEN UR STRIP-ACNC: NORMAL
YEAST UR QL: PRESENT /HPF

## 2024-06-05 PROCEDURE — 81001 URINALYSIS AUTO W/SCOPE: CPT

## 2024-06-05 PROCEDURE — 87086 URINE CULTURE/COLONY COUNT: CPT

## 2024-11-06 ENCOUNTER — LAB ENCOUNTER (OUTPATIENT)
Dept: LAB | Facility: HOSPITAL | Age: 89
End: 2024-11-06
Attending: INTERNAL MEDICINE
Payer: MEDICARE

## 2024-11-06 DIAGNOSIS — R53.83 FATIGUE: Primary | ICD-10-CM

## 2024-11-06 LAB
ALBUMIN SERPL-MCNC: 4.3 G/DL (ref 3.2–4.8)
ALBUMIN/GLOB SERPL: 1.5 {RATIO} (ref 1–2)
ALP LIVER SERPL-CCNC: 57 U/L
ALT SERPL-CCNC: 16 U/L
ANION GAP SERPL CALC-SCNC: 5 MMOL/L (ref 0–18)
AST SERPL-CCNC: 31 U/L (ref ?–34)
BASOPHILS # BLD AUTO: 0.06 X10(3) UL (ref 0–0.2)
BASOPHILS NFR BLD AUTO: 1.6 %
BILIRUB SERPL-MCNC: 1 MG/DL (ref 0.2–0.9)
BUN BLD-MCNC: 23 MG/DL (ref 9–23)
BUN/CREAT SERPL: 21.5 (ref 10–20)
CALCIUM BLD-MCNC: 9.7 MG/DL (ref 8.7–10.4)
CHLORIDE SERPL-SCNC: 108 MMOL/L (ref 98–112)
CO2 SERPL-SCNC: 28 MMOL/L (ref 21–32)
CREAT BLD-MCNC: 1.07 MG/DL
DEPRECATED RDW RBC AUTO: 86.6 FL (ref 35.1–46.3)
EGFRCR SERPLBLD CKD-EPI 2021: 65 ML/MIN/1.73M2 (ref 60–?)
EOSINOPHIL # BLD AUTO: 0.09 X10(3) UL (ref 0–0.7)
EOSINOPHIL NFR BLD AUTO: 2.4 %
ERYTHROCYTE [DISTWIDTH] IN BLOOD BY AUTOMATED COUNT: 22.3 % (ref 11–15)
FASTING STATUS PATIENT QL REPORTED: NO
GLOBULIN PLAS-MCNC: 2.9 G/DL (ref 2–3.5)
GLUCOSE BLD-MCNC: 132 MG/DL (ref 70–99)
HCT VFR BLD AUTO: 33.1 %
HGB BLD-MCNC: 10.7 G/DL
IMM GRANULOCYTES # BLD AUTO: 0.02 X10(3) UL (ref 0–1)
IMM GRANULOCYTES NFR BLD: 0.5 %
LYMPHOCYTES # BLD AUTO: 1.02 X10(3) UL (ref 1–4)
LYMPHOCYTES NFR BLD AUTO: 27.1 %
MCH RBC QN AUTO: 34 PG (ref 26–34)
MCHC RBC AUTO-ENTMCNC: 32.3 G/DL (ref 31–37)
MCV RBC AUTO: 105.1 FL
MONOCYTES # BLD AUTO: 1.03 X10(3) UL (ref 0.1–1)
MONOCYTES NFR BLD AUTO: 27.4 %
NEUTROPHILS # BLD AUTO: 1.54 X10 (3) UL (ref 1.5–7.7)
NEUTROPHILS # BLD AUTO: 1.54 X10(3) UL (ref 1.5–7.7)
NEUTROPHILS NFR BLD AUTO: 41 %
OSMOLALITY SERPL CALC.SUM OF ELEC: 298 MOSM/KG (ref 275–295)
PLATELET # BLD AUTO: 357 10(3)UL (ref 150–450)
PLATELET MORPHOLOGY: NORMAL
POTASSIUM SERPL-SCNC: 4.2 MMOL/L (ref 3.5–5.1)
PROT SERPL-MCNC: 7.2 G/DL (ref 5.7–8.2)
RBC # BLD AUTO: 3.15 X10(6)UL
SODIUM SERPL-SCNC: 141 MMOL/L (ref 136–145)
WBC # BLD AUTO: 3.8 X10(3) UL (ref 4–11)

## 2024-11-06 PROCEDURE — 80053 COMPREHEN METABOLIC PANEL: CPT

## 2024-11-06 PROCEDURE — 85025 COMPLETE CBC W/AUTO DIFF WBC: CPT

## 2024-11-06 PROCEDURE — 36415 COLL VENOUS BLD VENIPUNCTURE: CPT

## 2024-12-21 ENCOUNTER — APPOINTMENT (OUTPATIENT)
Dept: CT IMAGING | Facility: HOSPITAL | Age: 89
End: 2024-12-21
Attending: EMERGENCY MEDICINE
Payer: MEDICARE

## 2024-12-21 ENCOUNTER — APPOINTMENT (OUTPATIENT)
Dept: CT IMAGING | Facility: HOSPITAL | Age: 89
DRG: 662 | End: 2024-12-21
Attending: EMERGENCY MEDICINE
Payer: MEDICARE

## 2024-12-21 ENCOUNTER — HOSPITAL ENCOUNTER (INPATIENT)
Facility: HOSPITAL | Age: 89
LOS: 5 days | Discharge: NURSING, REHAB OR LONG-TERM CARE | End: 2024-12-27
Attending: EMERGENCY MEDICINE | Admitting: INTERNAL MEDICINE
Payer: MEDICARE

## 2024-12-21 ENCOUNTER — HOSPITAL ENCOUNTER (INPATIENT)
Facility: HOSPITAL | Age: 89
LOS: 5 days | Discharge: NURSING, REHAB OR LONG-TERM CARE | DRG: 662 | End: 2024-12-27
Attending: EMERGENCY MEDICINE | Admitting: INTERNAL MEDICINE
Payer: MEDICARE

## 2024-12-21 DIAGNOSIS — N17.9 AKI (ACUTE KIDNEY INJURY) (HCC): ICD-10-CM

## 2024-12-21 DIAGNOSIS — R31.0 GROSS HEMATURIA: Primary | ICD-10-CM

## 2024-12-21 DIAGNOSIS — D64.9 ANEMIA, UNSPECIFIED TYPE: ICD-10-CM

## 2024-12-21 DIAGNOSIS — S06.5XAA SUBDURAL HEMATOMA (HCC): ICD-10-CM

## 2024-12-21 LAB
ALBUMIN SERPL-MCNC: 4.8 G/DL (ref 3.2–4.8)
ALBUMIN/GLOB SERPL: 2 {RATIO} (ref 1–2)
ALP LIVER SERPL-CCNC: 47 U/L
ALT SERPL-CCNC: 20 U/L
ANION GAP SERPL CALC-SCNC: 14 MMOL/L (ref 0–18)
AST SERPL-CCNC: 33 U/L (ref ?–34)
BILIRUB SERPL-MCNC: 2.1 MG/DL (ref 0.2–0.9)
BILIRUB UR QL CFM: NEGATIVE
BUN BLD-MCNC: 33 MG/DL (ref 9–23)
BUN/CREAT SERPL: 19.1 (ref 10–20)
CALCIUM BLD-MCNC: 10.1 MG/DL (ref 8.7–10.4)
CHLORIDE SERPL-SCNC: 104 MMOL/L (ref 98–112)
CO2 SERPL-SCNC: 20 MMOL/L (ref 21–32)
CREAT BLD-MCNC: 1.73 MG/DL
EGFRCR SERPLBLD CKD-EPI 2021: 36 ML/MIN/1.73M2 (ref 60–?)
GLOBULIN PLAS-MCNC: 2.4 G/DL (ref 2–3.5)
GLUCOSE BLD-MCNC: 176 MG/DL (ref 70–99)
GLUCOSE BLDC GLUCOMTR-MCNC: 165 MG/DL (ref 70–99)
OSMOLALITY SERPL CALC.SUM OF ELEC: 298 MOSM/KG (ref 275–295)
POTASSIUM SERPL-SCNC: 4.2 MMOL/L (ref 3.5–5.1)
PROCALCITONIN SERPL-MCNC: 0.06 NG/ML (ref ?–0.05)
PROT SERPL-MCNC: 7.2 G/DL (ref 5.7–8.2)
RBC #/AREA URNS AUTO: >10 /HPF
RBC #/AREA URNS AUTO: >10 /HPF
SODIUM SERPL-SCNC: 138 MMOL/L (ref 136–145)
SP GR UR STRIP: 1.02 (ref 1–1.03)
WBC #/AREA URNS AUTO: >50 /HPF
WBC #/AREA URNS AUTO: >50 /HPF

## 2024-12-21 PROCEDURE — 74177 CT ABD & PELVIS W/CONTRAST: CPT | Performed by: EMERGENCY MEDICINE

## 2024-12-21 PROCEDURE — 3C1ZX8Z IRRIGATION OF INDWELLING DEVICE USING IRRIGATING SUBSTANCE, EXTERNAL APPROACH: ICD-10-PCS | Performed by: EMERGENCY MEDICINE

## 2024-12-21 RX ORDER — MAGNESIUM HYDROXIDE 1200 MG/15ML
3000 LIQUID ORAL CONTINUOUS
Status: DISCONTINUED | OUTPATIENT
Start: 2024-12-21 | End: 2024-12-27

## 2024-12-22 ENCOUNTER — APPOINTMENT (OUTPATIENT)
Dept: CT IMAGING | Facility: HOSPITAL | Age: 89
End: 2024-12-22
Attending: STUDENT IN AN ORGANIZED HEALTH CARE EDUCATION/TRAINING PROGRAM
Payer: MEDICARE

## 2024-12-22 ENCOUNTER — ANESTHESIA EVENT (OUTPATIENT)
Dept: SURGERY | Facility: HOSPITAL | Age: 89
End: 2024-12-22
Payer: MEDICARE

## 2024-12-22 ENCOUNTER — APPOINTMENT (OUTPATIENT)
Dept: CT IMAGING | Facility: HOSPITAL | Age: 89
DRG: 662 | End: 2024-12-22
Attending: STUDENT IN AN ORGANIZED HEALTH CARE EDUCATION/TRAINING PROGRAM
Payer: MEDICARE

## 2024-12-22 ENCOUNTER — ANESTHESIA (OUTPATIENT)
Dept: SURGERY | Facility: HOSPITAL | Age: 89
End: 2024-12-22
Payer: MEDICARE

## 2024-12-22 PROBLEM — R31.0 GROSS HEMATURIA: Status: ACTIVE | Noted: 2024-12-22

## 2024-12-22 PROBLEM — N17.9 AKI (ACUTE KIDNEY INJURY) (HCC): Status: ACTIVE | Noted: 2024-12-22

## 2024-12-22 PROBLEM — D64.9 ANEMIA, UNSPECIFIED TYPE: Status: ACTIVE | Noted: 2024-12-22

## 2024-12-22 PROBLEM — F05 DELIRIUM DUE TO ANOTHER MEDICAL CONDITION: Status: ACTIVE | Noted: 2024-12-22

## 2024-12-22 PROBLEM — N17.9 AKI (ACUTE KIDNEY INJURY): Status: ACTIVE | Noted: 2024-12-22

## 2024-12-22 LAB
ANION GAP SERPL CALC-SCNC: 11 MMOL/L (ref 0–18)
ANTIBODY SCREEN: NEGATIVE
BUN BLD-MCNC: 36 MG/DL (ref 9–23)
BUN/CREAT SERPL: 15.6 (ref 10–20)
CALCIUM BLD-MCNC: 9.1 MG/DL (ref 8.7–10.4)
CHLORIDE SERPL-SCNC: 107 MMOL/L (ref 98–112)
CO2 SERPL-SCNC: 21 MMOL/L (ref 21–32)
CREAT BLD-MCNC: 2.31 MG/DL
DEPRECATED RDW RBC AUTO: 83.4 FL (ref 35.1–46.3)
DEPRECATED RDW RBC AUTO: 86.3 FL (ref 35.1–46.3)
EGFRCR SERPLBLD CKD-EPI 2021: 26 ML/MIN/1.73M2 (ref 60–?)
ERYTHROCYTE [DISTWIDTH] IN BLOOD BY AUTOMATED COUNT: 22.5 % (ref 11–15)
ERYTHROCYTE [DISTWIDTH] IN BLOOD BY AUTOMATED COUNT: 23.9 % (ref 11–15)
GLUCOSE BLD-MCNC: 116 MG/DL (ref 70–99)
HCT VFR BLD AUTO: 19 %
HCT VFR BLD AUTO: 22.4 %
HGB BLD-MCNC: 6.3 G/DL
HGB BLD-MCNC: 7.4 G/DL
MCH RBC QN AUTO: 33 PG (ref 26–34)
MCH RBC QN AUTO: 33.2 PG (ref 26–34)
MCHC RBC AUTO-ENTMCNC: 33 G/DL (ref 31–37)
MCHC RBC AUTO-ENTMCNC: 33.2 G/DL (ref 31–37)
MCV RBC AUTO: 100.4 FL
MCV RBC AUTO: 99.5 FL
OSMOLALITY SERPL CALC.SUM OF ELEC: 297 MOSM/KG (ref 275–295)
PLATELET # BLD AUTO: 296 10(3)UL (ref 150–450)
PLATELET # BLD AUTO: 341 10(3)UL (ref 150–450)
POTASSIUM SERPL-SCNC: 4.8 MMOL/L (ref 3.5–5.1)
RBC # BLD AUTO: 1.91 X10(6)UL
RBC # BLD AUTO: 2.23 X10(6)UL
RH BLOOD TYPE: NEGATIVE
SODIUM SERPL-SCNC: 139 MMOL/L (ref 136–145)
WBC # BLD AUTO: 8.9 X10(3) UL (ref 4–11)
WBC # BLD AUTO: 9.5 X10(3) UL (ref 4–11)

## 2024-12-22 PROCEDURE — 90792 PSYCH DIAG EVAL W/MED SRVCS: CPT | Performed by: NURSE PRACTITIONER

## 2024-12-22 PROCEDURE — 0W3R8ZZ CONTROL BLEEDING IN GENITOURINARY TRACT, VIA NATURAL OR ARTIFICIAL OPENING ENDOSCOPIC: ICD-10-PCS | Performed by: STUDENT IN AN ORGANIZED HEALTH CARE EDUCATION/TRAINING PROGRAM

## 2024-12-22 PROCEDURE — 70450 CT HEAD/BRAIN W/O DYE: CPT | Performed by: STUDENT IN AN ORGANIZED HEALTH CARE EDUCATION/TRAINING PROGRAM

## 2024-12-22 PROCEDURE — 99223 1ST HOSP IP/OBS HIGH 75: CPT | Performed by: INTERNAL MEDICINE

## 2024-12-22 PROCEDURE — 0TCB8ZZ EXTIRPATION OF MATTER FROM BLADDER, VIA NATURAL OR ARTIFICIAL OPENING ENDOSCOPIC: ICD-10-PCS | Performed by: STUDENT IN AN ORGANIZED HEALTH CARE EDUCATION/TRAINING PROGRAM

## 2024-12-22 PROCEDURE — 30233N1 TRANSFUSION OF NONAUTOLOGOUS RED BLOOD CELLS INTO PERIPHERAL VEIN, PERCUTANEOUS APPROACH: ICD-10-PCS | Performed by: INTERNAL MEDICINE

## 2024-12-22 RX ORDER — LORAZEPAM 2 MG/ML
0.5 INJECTION INTRAMUSCULAR EVERY 6 HOURS PRN
Status: DISCONTINUED | OUTPATIENT
Start: 2024-12-22 | End: 2024-12-26

## 2024-12-22 RX ORDER — TAMSULOSIN HYDROCHLORIDE 0.4 MG/1
0.4 CAPSULE ORAL DAILY
Status: DISCONTINUED | OUTPATIENT
Start: 2024-12-22 | End: 2024-12-27

## 2024-12-22 RX ORDER — MIDODRINE HYDROCHLORIDE 5 MG/1
5 TABLET ORAL ONCE
Status: DISCONTINUED | OUTPATIENT
Start: 2024-12-22 | End: 2024-12-27

## 2024-12-22 RX ORDER — HALOPERIDOL 5 MG/ML
1 INJECTION INTRAMUSCULAR EVERY 6 HOURS PRN
Status: DISCONTINUED | OUTPATIENT
Start: 2024-12-22 | End: 2024-12-22

## 2024-12-22 RX ORDER — LEVOTHYROXINE SODIUM 25 UG/1
25 TABLET ORAL
Status: DISCONTINUED | OUTPATIENT
Start: 2024-12-22 | End: 2024-12-27

## 2024-12-22 RX ORDER — HYDROMORPHONE HYDROCHLORIDE 1 MG/ML
0.2 INJECTION, SOLUTION INTRAMUSCULAR; INTRAVENOUS; SUBCUTANEOUS EVERY 5 MIN PRN
Status: DISCONTINUED | OUTPATIENT
Start: 2024-12-22 | End: 2024-12-22 | Stop reason: HOSPADM

## 2024-12-22 RX ORDER — DEXAMETHASONE SODIUM PHOSPHATE 4 MG/ML
VIAL (ML) INJECTION AS NEEDED
Status: DISCONTINUED | OUTPATIENT
Start: 2024-12-22 | End: 2024-12-22 | Stop reason: SURG

## 2024-12-22 RX ORDER — FUROSEMIDE 20 MG/1
20 TABLET ORAL DAILY
COMMUNITY
End: 2024-12-27

## 2024-12-22 RX ORDER — ONDANSETRON 2 MG/ML
INJECTION INTRAMUSCULAR; INTRAVENOUS AS NEEDED
Status: DISCONTINUED | OUTPATIENT
Start: 2024-12-22 | End: 2024-12-22 | Stop reason: SURG

## 2024-12-22 RX ORDER — SODIUM CHLORIDE 9 MG/ML
INJECTION, SOLUTION INTRAVENOUS ONCE
Status: COMPLETED | OUTPATIENT
Start: 2024-12-22 | End: 2024-12-22

## 2024-12-22 RX ORDER — METOCLOPRAMIDE HYDROCHLORIDE 5 MG/ML
10 INJECTION INTRAMUSCULAR; INTRAVENOUS EVERY 8 HOURS PRN
Status: DISCONTINUED | OUTPATIENT
Start: 2024-12-22 | End: 2024-12-22 | Stop reason: HOSPADM

## 2024-12-22 RX ORDER — MORPHINE SULFATE 10 MG/ML
6 INJECTION, SOLUTION INTRAMUSCULAR; INTRAVENOUS EVERY 10 MIN PRN
Status: DISCONTINUED | OUTPATIENT
Start: 2024-12-22 | End: 2024-12-22 | Stop reason: HOSPADM

## 2024-12-22 RX ORDER — MIRTAZAPINE 7.5 MG/1
15 TABLET, FILM COATED ORAL NIGHTLY
Status: DISCONTINUED | OUTPATIENT
Start: 2024-12-22 | End: 2024-12-26

## 2024-12-22 RX ORDER — ONDANSETRON 2 MG/ML
4 INJECTION INTRAMUSCULAR; INTRAVENOUS EVERY 6 HOURS PRN
Status: DISCONTINUED | OUTPATIENT
Start: 2024-12-22 | End: 2024-12-22 | Stop reason: HOSPADM

## 2024-12-22 RX ORDER — DONEPEZIL HYDROCHLORIDE 5 MG/1
5 TABLET, FILM COATED ORAL NIGHTLY
Status: DISCONTINUED | OUTPATIENT
Start: 2024-12-22 | End: 2024-12-27

## 2024-12-22 RX ORDER — MORPHINE SULFATE 4 MG/ML
2 INJECTION, SOLUTION INTRAMUSCULAR; INTRAVENOUS EVERY 10 MIN PRN
Status: DISCONTINUED | OUTPATIENT
Start: 2024-12-22 | End: 2024-12-22 | Stop reason: HOSPADM

## 2024-12-22 RX ORDER — SODIUM CHLORIDE 9 MG/ML
INJECTION, SOLUTION INTRAVENOUS CONTINUOUS
Status: DISCONTINUED | OUTPATIENT
Start: 2024-12-22 | End: 2024-12-25

## 2024-12-22 RX ORDER — SODIUM CHLORIDE 9 MG/ML
INJECTION, SOLUTION INTRAVENOUS CONTINUOUS
Status: ACTIVE | OUTPATIENT
Start: 2024-12-22 | End: 2024-12-22

## 2024-12-22 RX ORDER — CEFAZOLIN SODIUM 1 G/3ML
INJECTION, POWDER, FOR SOLUTION INTRAMUSCULAR; INTRAVENOUS AS NEEDED
Status: DISCONTINUED | OUTPATIENT
Start: 2024-12-22 | End: 2024-12-22 | Stop reason: SURG

## 2024-12-22 RX ORDER — HALOPERIDOL 5 MG/ML
2 INJECTION INTRAMUSCULAR EVERY 6 HOURS PRN
Status: DISCONTINUED | OUTPATIENT
Start: 2024-12-22 | End: 2024-12-26

## 2024-12-22 RX ORDER — HYDROMORPHONE HYDROCHLORIDE 1 MG/ML
0.6 INJECTION, SOLUTION INTRAMUSCULAR; INTRAVENOUS; SUBCUTANEOUS EVERY 5 MIN PRN
Status: DISCONTINUED | OUTPATIENT
Start: 2024-12-22 | End: 2024-12-22 | Stop reason: HOSPADM

## 2024-12-22 RX ORDER — SODIUM CHLORIDE, SODIUM LACTATE, POTASSIUM CHLORIDE, CALCIUM CHLORIDE 600; 310; 30; 20 MG/100ML; MG/100ML; MG/100ML; MG/100ML
INJECTION, SOLUTION INTRAVENOUS CONTINUOUS PRN
Status: DISCONTINUED | OUTPATIENT
Start: 2024-12-22 | End: 2024-12-22 | Stop reason: SURG

## 2024-12-22 RX ORDER — APIXABAN 5 MG/1
5 TABLET, FILM COATED ORAL 2 TIMES DAILY
COMMUNITY
End: 2024-12-27

## 2024-12-22 RX ORDER — MORPHINE SULFATE 4 MG/ML
4 INJECTION, SOLUTION INTRAMUSCULAR; INTRAVENOUS EVERY 10 MIN PRN
Status: DISCONTINUED | OUTPATIENT
Start: 2024-12-22 | End: 2024-12-22 | Stop reason: HOSPADM

## 2024-12-22 RX ORDER — LIDOCAINE HYDROCHLORIDE 10 MG/ML
INJECTION, SOLUTION EPIDURAL; INFILTRATION; INTRACAUDAL; PERINEURAL AS NEEDED
Status: DISCONTINUED | OUTPATIENT
Start: 2024-12-22 | End: 2024-12-22 | Stop reason: SURG

## 2024-12-22 RX ORDER — OLANZAPINE 5 MG/1
5 TABLET, ORALLY DISINTEGRATING ORAL NIGHTLY
Status: DISCONTINUED | OUTPATIENT
Start: 2024-12-22 | End: 2024-12-26

## 2024-12-22 RX ORDER — ACETAMINOPHEN 500 MG
500 TABLET ORAL EVERY 4 HOURS PRN
Status: DISCONTINUED | OUTPATIENT
Start: 2024-12-22 | End: 2024-12-27

## 2024-12-22 RX ORDER — HYDROMORPHONE HYDROCHLORIDE 1 MG/ML
0.4 INJECTION, SOLUTION INTRAMUSCULAR; INTRAVENOUS; SUBCUTANEOUS EVERY 5 MIN PRN
Status: DISCONTINUED | OUTPATIENT
Start: 2024-12-22 | End: 2024-12-22 | Stop reason: HOSPADM

## 2024-12-22 RX ORDER — TAMSULOSIN HYDROCHLORIDE 0.4 MG/1
0.4 CAPSULE ORAL DAILY
COMMUNITY

## 2024-12-22 RX ORDER — SODIUM CHLORIDE, SODIUM LACTATE, POTASSIUM CHLORIDE, CALCIUM CHLORIDE 600; 310; 30; 20 MG/100ML; MG/100ML; MG/100ML; MG/100ML
INJECTION, SOLUTION INTRAVENOUS CONTINUOUS
Status: DISCONTINUED | OUTPATIENT
Start: 2024-12-22 | End: 2024-12-22 | Stop reason: HOSPADM

## 2024-12-22 RX ORDER — NALOXONE HYDROCHLORIDE 0.4 MG/ML
0.08 INJECTION, SOLUTION INTRAMUSCULAR; INTRAVENOUS; SUBCUTANEOUS AS NEEDED
Status: DISCONTINUED | OUTPATIENT
Start: 2024-12-22 | End: 2024-12-22 | Stop reason: HOSPADM

## 2024-12-22 RX ORDER — PHENYLEPHRINE HCL 10 MG/ML
VIAL (ML) INJECTION AS NEEDED
Status: DISCONTINUED | OUTPATIENT
Start: 2024-12-22 | End: 2024-12-22 | Stop reason: SURG

## 2024-12-22 RX ADMIN — LIDOCAINE HYDROCHLORIDE 50 MG: 10 INJECTION, SOLUTION EPIDURAL; INFILTRATION; INTRACAUDAL; PERINEURAL at 18:48:00

## 2024-12-22 RX ADMIN — DEXAMETHASONE SODIUM PHOSPHATE 4 MG: 4 MG/ML VIAL (ML) INJECTION at 18:48:00

## 2024-12-22 RX ADMIN — CEFAZOLIN SODIUM 2 G: 1 INJECTION, POWDER, FOR SOLUTION INTRAMUSCULAR; INTRAVENOUS at 18:56:00

## 2024-12-22 RX ADMIN — ONDANSETRON 4 MG: 2 INJECTION INTRAMUSCULAR; INTRAVENOUS at 19:11:00

## 2024-12-22 RX ADMIN — PHENYLEPHRINE HCL 100 MCG: 10 MG/ML VIAL (ML) INJECTION at 18:58:00

## 2024-12-22 RX ADMIN — SODIUM CHLORIDE, SODIUM LACTATE, POTASSIUM CHLORIDE, CALCIUM CHLORIDE: 600; 310; 30; 20 INJECTION, SOLUTION INTRAVENOUS at 18:46:00

## 2024-12-22 NOTE — CONSULTS
[facility]  part of [facility]    Report of Consultation    Jeff Lamb Patient Status:  Inpatient    3/14/1931 MRN [id number]   Location [facility] 4W/SW/SE Attending Miroslava Mauro MD   Hosp Day # 0 PCP DEBRA DENNY MD     Date of Admission:  2024  Date of Consult:  24  Reason for Consultation:   Gross hematuria    History of Present Illness:   Patient is a 89 year old male who was admitted to the hospital for Gross hematuria:    89-year-old male who recently just had a TURBT at [facility] presented with gross hematuria.  Patient has Ta high-grade bladder cancer with no invasion into the muscle.  Son and daughter are at bedside who give me all of history as patient is disoriented and sleeping.  They stated that he just started to get confused yesterday and then they noticed the blood in the urine.    Past Medical History  History reviewed. No pertinent past medical history.    Past Surgical History  History reviewed. No pertinent surgical history.    Family History  No family history on file.    Social History  Social History     Socioeconomic History    Marital status:    Tobacco Use    Smoking status: Unknown     Social Drivers of Health     Food Insecurity: No Food Insecurity (2024)    Food Insecurity     Food Insecurity: Never true   Transportation Needs: No Transportation Needs (2024)    Transportation Needs     Lack of Transportation: No   Housing Stability: Low Risk  (2024)    Housing Stability     Housing Instability: No        Current Medications:  Current Facility-Administered Medications   Medication Dose Route Frequency    donepezil (Aricept) tab 5 mg  5 mg Oral Nightly    levothyroxine (Synthroid) tab 25 mcg  25 mcg Oral Before breakfast    mirtazapine (Remeron) tab 15 mg  15 mg Oral Nightly    sodium chloride 0.9% infusion   Intravenous Continuous    acetaminophen (Tylenol Extra Strength) tab 500 mg  500 mg Oral Q4H PRN    [START ON  12/23/2024] cefTRIAXone (Rocephin) 2 g in sodium chloride 0.9% 100 mL IVPB-ADDV  2 g Intravenous Q24H    tamsulosin (Flomax) cap 0.4 mg  0.4 mg Oral Daily    midodrine (ProAmatine) tab 5 mg  5 mg Oral Once    LORazepam (Ativan) 2 mg/mL injection 0.5 mg  0.5 mg Intravenous Q6H PRN    OLANZapine (ZyPREXA Zydis) disintegrating tab 5 mg  5 mg Oral Nightly    haloperidol lactate (Haldol) 5 MG/ML injection 2 mg  2 mg Intravenous Q6H PRN    sodium chloride 0.9 % irrigation 3,000 mL  3,000 mL Irrigation Continuous     Medications Prior to Admission   Medication Sig    ELIQUIS 5 MG Oral Tab Take 1 tablet (5 mg total) by mouth 2 (two) times daily.    furosemide 20 MG Oral Tab Take 1 tablet (20 mg total) by mouth daily.    tamsulosin 0.4 MG Oral Cap Take 1 capsule (0.4 mg total) by mouth daily.    donepezil 5 MG Oral Tab Take 1 tablet (5 mg total) by mouth nightly.    mirtazapine 15 MG Oral Tab Take 1 tablet (15 mg total) by mouth. At Bedtime    Levothyroxine Sodium (SYNTHROID, LEVOTHROID) 25 MCG Oral Tab Take 1 tablet (25 mcg total) by mouth before breakfast.       Allergies  Allergies[1]    Review of Systems:    Pertinent items are noted in HPI.    Physical Exam:   Blood pressure 115/72, pulse 83, temperature 97.6 °F (36.4 °C), temperature source Temporal, resp. rate 16, weight 165 lb 2 oz (74.9 kg), SpO2 96%.    GENERAL: Confused with his eyes closed   HEENT: atraumatic, normocephalic  LUNGS: normal respiratory motion without distress  CARDIO:NA  Abd: Soft, non-tender, non-distended  : No SPT or CVAT, 20 [place] three-way catheter in place with clear urine running on CBI but unable to irrigate any clots patient also very combative while trying to irrigate      Results:     Laboratory Data:  Lab Results   Component Value Date    WBC 9.5 12/22/2024    HGB 7.4 (L) 12/22/2024    HCT 22.4 (L) 12/22/2024    .0 12/22/2024    CREATSERUM 2.31 (H) 12/22/2024    BUN 36 (H) 12/22/2024     12/22/2024    K 4.8 12/22/2024      12/22/2024    CO2 21.0 12/22/2024     (H) 12/22/2024    CA 9.1 12/22/2024    ALB 4.8 12/21/2024    ALKPHO 47 12/21/2024    TP 7.2 12/21/2024    AST 33 12/21/2024    ALT 20 12/21/2024    PTT 30.2 11/24/2015    INR 1.1 11/24/2015    TSH 2.673 05/03/2024    CRP <0.5 11/24/2015    B12 692 09/11/2019         Imaging:  CT BRAIN OR HEAD (CPT=70450)    Result Date: 12/22/2024  CONCLUSION:  1. A small 3 mm thick left parietal subdural hematoma or less likely a thickened dural membrane related to a remote hematoma.  No hydrocephalus or midline shift.  Suggest follow-up CT in 12 hours.  Findings were called to [facility] to [facility]. 2. Moderate to advanced changes of chronic small vessel disease in both cerebral hemispheres.  New line large vessel intracranial atherosclerosis. 3. Large vessel intracranial atherosclerosis.     Dictated by (CST): Ramiro Jama MD on 12/22/2024 at 12:03 PM     Finalized by (CST): Ramiro Jama MD on 12/22/2024 at 12:12 PM          CT ABDOMEN+PELVIS(CONTRAST ONLY)(CPT=74177)    Result Date: 12/22/2024  CONCLUSION:  1. Mild-to-moderate bilateral hydroureteronephrosis to the level of the urinary bladder with a large bladder hematoma partially surrounding a Shields catheter.  Marked bladder distension despite the Shields catheter.  Urology evaluation recommended to evaluate the urinary bladder. 2. Nonobstructing renal calculi.    Dictated by (CST): Ramiro Jama MD on 12/22/2024 at 5:58 AM     Finalized by (CST): Ramiro Jama MD on 12/22/2024 at 6:09 AM              Impression:      89-year-old male with TA high-grade noninvasive bladder cancer and gross hematuria    CT abdomen and pelvis reviewed and large clots in the bladder with a distended bladder and bilateral hydroureteronephrosis.    Recommendations:  -Patient would not let me irrigate at bedside as he is disoriented and combative during irrigation.  Given this and CT findings we decided to take the patient back to the operating  room for clot evacuation and fulguration of bleeding.  The son and daughter were explained the risks benefits and alternatives of the procedure and are in agreement    Thank you for allowing me to participate in the care of your patient.    Allyson Luna DO  12/22/2024         [1] No Known Allergies

## 2024-12-22 NOTE — PLAN OF CARE
Pt is A&O x 1-2, Breathing on room air. Family at bedside. Remote tele in place. NPO. Continuous CBI. Continuous IV fluid infusing. Call light within reach. Safety precaution in place.    Hg 6.3, Given I unit of PRBC per MD.    Problem: Patient Centered Care  Goal: Patient preferences are identified and integrated in the patient's plan of care  Description: Interventions:  - What would you like us to know as we care for you?   - Provide timely, complete, and accurate information to patient/family  - Incorporate patient and family knowledge, values, beliefs, and cultural backgrounds into the planning and delivery of care  - Encourage patient/family to participate in care and decision-making at the level they choose  - Honor patient and family perspectives and choices  Outcome: Progressing     Problem: Patient/Family Goals  Goal: Patient/Family Long Term Goal  Description: Patient's Long Term Goal:     Interventions:  -   - See additional Care Plan goals for specific interventions  Outcome: Progressing  Goal: Patient/Family Short Term Goal  Description: Patient's Short Term Goal:     Interventions:   -   - See additional Care Plan goals for specific interventions  Outcome: Progressing     Problem: PAIN - ADULT  Goal: Verbalizes/displays adequate comfort level or patient's stated pain goal  Description: INTERVENTIONS:  - Encourage pt to monitor pain and request assistance  - Assess pain using appropriate pain scale  - Administer analgesics based on type and severity of pain and evaluate response  - Implement non-pharmacological measures as appropriate and evaluate response  - Consider cultural and social influences on pain and pain management  - Manage/alleviate anxiety  - Utilize distraction and/or relaxation techniques  - Monitor for opioid side effects  - Notify MD/LIP if interventions unsuccessful or patient reports new pain  - Anticipate increased pain with activity and pre-medicate as appropriate  Outcome:  Progressing     Problem: RISK FOR INFECTION - ADULT  Goal: Absence of fever/infection during anticipated neutropenic period  Description: INTERVENTIONS  - Monitor WBC  - Administer growth factors as ordered  - Implement neutropenic guidelines  Outcome: Progressing     Problem: SAFETY ADULT - FALL  Goal: Free from fall injury  Description: INTERVENTIONS:  - Assess pt frequently for physical needs  - Identify cognitive and physical deficits and behaviors that affect risk of falls.  - [place] fall precautions as indicated by assessment.  - Educate pt/family on patient safety including physical limitations  - Instruct pt to call for assistance with activity based on assessment  - Modify environment to reduce risk of injury  - Provide assistive devices as appropriate  - Consider OT/PT consult to assist with strengthening/mobility  - Encourage toileting schedule  Outcome: Progressing     Problem: DISCHARGE PLANNING  Goal: Discharge to home or other facility with appropriate resources  Description: INTERVENTIONS:  - Identify barriers to discharge w/pt and caregiver  - Include patient/family/discharge partner in discharge planning  - Arrange for needed discharge resources and transportation as appropriate  - Identify discharge learning needs (meds, wound care, etc)  - Arrange for interpreters to assist at discharge as needed  - Consider post-discharge preferences of patient/family/discharge partner  - Complete POLST form as appropriate  - Assess patient's ability to be responsible for managing their own health  - Refer to Case Management Department for coordinating discharge planning if the patient needs post-hospital services based on physician/LIP order or complex needs related to functional status, cognitive ability or social support system  Outcome: Progressing

## 2024-12-22 NOTE — H&P
[facility]  part of [facility]                                                                                                          History and Physical     Jeff Lamb Patient Status:  Emergency    3/14/1931 MRN [id number]   Location [facility] EMERGENCY DEPARTMENT Attending Ernie Root MD   Hosp Day # 0 PCP DEBRA DENNY MD     Patient is confused.  History obtained from family at bedside.    Chief Complaint: Paralyzed weakness, blood in urine    Subjective:    Jeff Lamb is a 89 year old male with history of PAF, hemochromatosis, hypothyroidism and recently diagnosed high-grade noninvasive TCC of bladder who presented to the ED today with complaints of generalized weakness and blood in urine.  Blood noticed the day prior.  Progressively worse.  Has had mild suprapubic discomfort.  He is confused today, seems to have been worsening over the last few days.  Per son, the last 2 days he has been obsessed with the house generator.    Most recent biopsy 10/1/2024.  Currently not on treatment.  At presentation to the ED he was noted with gross hematuria with clots.  Vital stable.  Labs with creatinine 1.73, hemoglobin 7.7, WBC 11.3  UA with WBC, RBC and rare bacteria.  A three-way Shields catheter was placed and CBI initiated.  Empiric antibiotics with ceftriaxone initiated.  Urology on consult.    He is going to be admitted for ongoing treatment.    No fever, chills, diarrhea, nausea, vomiting, chest pain, cough or difficulty breathing reported by family    History/Other:      Past Medical History:History reviewed. No pertinent past medical history.  Hemochromatosis  Carotid stenosis  Paroxysmal A-fib  Hypothyroidism  Prostate CA    Past Surgical History: History reviewed. No pertinent surgical history.  Bladder biopsy    Social History:  Non-smoker    Family History: No family history on file.    Allergies: Allergies[1]    Medications:  Medications Ordered Prior to  Encounter[2]    Review of Systems:   Unable to obtain except as per family.    Objective:     /54   Pulse 67   Temp 97.9 °F (36.6 °C) (Oral)   Resp 16   SpO2 100%   General: No acute distress.    HEENT: Normocephalic, atraumatic.  Neck: Supple.  Respiratory: Normal effort.  CTAB  Cardiovascular: S1, S2 regular.  Normal rate.   Abdomen: Soft, Nontender distended.  Neurologic: Alert, confused.  Nonfocal.  Musculoskeletal: No tenderness or deformity.  Extremities: No edema  : Three-way Shields catheter/CBI.  Bloody urine.   Psychiatric: Unable to assess.  Patient confused    Results:      Labs:  Labs Last 24 Hours:   BMP     CBC    Other     Na 138 Cl 104 BUN 33 Glu 176   Hb 7.7   PTT - Procal 0.06   K 4.2 CO2 20.0 Cr 1.73   WBC 11.3 >< .0  INR - CRP -   Renal Lytes Endo    Hct 22.8   Trop - D dim -   eGFR - Ca 10.1 POC Gluc  165    LFT   pBNP - Lactic -   eGFR AA - PO4 - A1c -   AST 33 APk 47 Prot 7.2  LDL -     Mg - TSH -   ALT 20 T matthieu 2.1 Alb 4.8          COVID-19 Lab Results    COVID-19  No results found for: \"COVID19\"    Pro-Calcitonin  Recent Labs   Lab 12/21/24 2038   PCT 0.06*       Cardiac  No results for input(s): \"TROP\", \"PBNP\" in the last 168 hours.    Creatinine Kinase  No results for input(s): \"CK\" in the last 168 hours.    Inflammatory Markers  No results for input(s): \"CRP\", \"ELVER\", \"LDH\", \"DDIMER\" in the last 168 hours.    Imaging: Imaging data reviewed in Epic.    Assessment & Plan:    Gross hematuria.  UTI versus other  Bladder cancer with most recent TURBT 10/1/2024  -Admit  -Continue CBI  -Empiric antibiotics coverage, obtain urine culture  -Urology consult  -Eliquis  -Currently holding off on treatment of bladder CA/BCG per family/chart review    Altered mental status, possibly metabolic/toxic  -Empiric antibiotics  -Monitor    Acute blood loss on chronic anemia.  Previous hemoglobin 10.7  -Type, screen.  Transfuse to keep hemoglobin greater than 7  -Discussed with family,  agreeable    Acute kidney injury.  Last creatinine 1.07  -Likely due to obstruction from clots  -IV fluids    History of PAF on Eliquis  -Hold Eliquis  -Continue other home medications    Hypothyroidism  History of hemochromatosis  -Continue home meds    Quality:  DVT Prophylaxis: CT  CODE status: Presumed full code  SANDOVAL: TBD      Plan of care discussed with patient. Acknowledged understanding and agrees to plan. Also discussed with the ED physician.    High MDM  Time spent on this admission - examining patient, obtaining history, reviewing previous medical records, going over test results/imaging and discussing plan of care. More than 50% of the time was spent in counseling and/or coordination of care related to hematuria, altered mental status.   All questions answered.     Belen Melo MD  12/22/2024                   [1] No Known Allergies  [2]   No current facility-administered medications on file prior to encounter.     Current Outpatient Medications on File Prior to Encounter   Medication Sig Dispense Refill    donepezil 5 MG Oral Tab Take 1 tablet (5 mg total) by mouth nightly.      mirtazapine 15 MG Oral Tab Take 1 tablet (15 mg total) by mouth. At Bedtime  1    Levothyroxine Sodium (SYNTHROID, LEVOTHROID) 25 MCG Oral Tab Take 1 tablet (25 mcg total) by mouth before breakfast. 1 tablet 0

## 2024-12-22 NOTE — ED PROVIDER NOTES
Patient Seen in: [facility] Emergency Department    History     Chief Complaint   Patient presents with    Urinary Symptoms           Weakness       HPI    89-year-old male presents to the ED via EMS for general weakness for a few days and blood in the urine.  EMS states the patient had a tumor removed from the bladder in October.  Patient's daughters arrived at bedside and states that patient has had some intermittent blood in the urine but it seemed to be much worse than usual and this last 3 or 4 days.  Patient has had no energy and is having a difficult time walking without assistance now.  He has had loss of appetite in the last day.  No diarrhea, fever, chills, vomiting.    History from Independent Source: Initial history given by EMS and patient's daughter as stated in HPI    External Records Reviewed: On chart review, patient had transurethral resection of bladder tumor and cystoscopy on October 1 at [facility].  On chart review, patient has history of A-fib on Eliquis, hypothyroidism, bladder cancer, hemochromatosis, anemia.    History reviewed. History reviewed. No pertinent past medical history.    History reviewed. History reviewed. No pertinent surgical history.      Medications :  Prescriptions Prior to Admission[1]     No family history on file.    Smoking Status:   Social History     Socioeconomic History    Marital status:    Tobacco Use    Smoking status: Unknown       Constitutional and vital signs reviewed.      Social History and Family History elements reviewed from today, pertinent positives to the presenting problem noted.    Physical Exam     ED Triage Vitals [12/21/24 2036]   /85   Pulse 98   Resp 18   Temp 97.9 °F (36.6 °C)   Temp src Oral   SpO2 98 %   O2 Device None (Room air)       Physical Exam   Constitutional: AAOx3, well nourished, NAD  HEENT: Normocephalic, PERRLA, MMM  CV: s1s2+, RRR, no m/r/g, normal distal pulses  Pulmonary/Chest: CTA b/l with no rales,  wheezes.  No chest wall tenderness  Abdominal: Mild suprapubic tenderness to palpation without rebound or guarding.  Nondistended. Soft. Bowel sounds are normal.   Neck/Back:   : Rectal exam completed with empty rectal vault  Musculoskeletal: Normal range of motion. No deformity.   Neurological: Awake, alert. Normal reflexes. No cranial nerve deficit.    Skin: Skin is warm and dry. No rash noted. No erythema.   Psychiatric:      All measures to prevent infection transmission during my interaction with the patient were taken. The patient was already wearing a droplet mask on my arrival to the room. Personal protective equipment was worn throughout the duration of the exam.      ED Course        Labs Reviewed   URINALYSIS WITH CULTURE REFLEX - Abnormal; Notable for the following components:       Result Value    Urine Color Red (*)     Clarity Urine Turbid (*)     WBC Urine >50 (*)     RBC Urine >10 (*)     Bacteria Urine Rare (*)     All other components within normal limits   CBC WITH DIFFERENTIAL WITH PLATELET - Abnormal; Notable for the following components:    WBC 11.3 (*)     RBC 2.23 (*)     HGB 7.7 (*)     HCT 22.8 (*)     .2 (*)     MCH 34.5 (*)     RDW-SD 89.4 (*)     RDW 23.9 (*)     Neutrophil Absolute Prelim 8.15 (*)     All other components within normal limits   COMP METABOLIC PANEL (14) - Abnormal; Notable for the following components:    Glucose 176 (*)     CO2 20.0 (*)     BUN 33 (*)     Creatinine 1.73 (*)     Calculated Osmolality 298 (*)     eGFR-Cr 36 (*)     Bilirubin, Total 2.1 (*)     All other components within normal limits   PROCALCITONIN - Abnormal; Notable for the following components:    Procalcitonin 0.06 (*)     All other components within normal limits   UA MICROSCOPIC ONLY, URINE - Abnormal; Notable for the following components:    WBC Urine >50 (*)     RBC Urine >10 (*)     Bacteria Urine Rare (*)     All other components within normal limits   POCT GLUCOSE - Abnormal;  Notable for the following components:    POC Glucose  165 (*)     All other components within normal limits   ICTOTEST - Normal   RBC MORPHOLOGY SCAN   SCAN SLIDE   MD BLOOD SMEAR CONSULT   RAINBOW DRAW LAVENDER   RAINBOW DRAW LIGHT GREEN   RAINBOW DRAW BLUE     My Independent Interpretation of EKG (if performed):     Monitor Interpretation:   normal sinus rhythm as interpreted by me.      Imaging Results Available and Reviewed while in ED: No results found.[facility], [facility]  [phone] - Phone    [facility]    NAME: ALISHA CARR    DATE OF EXAM: 12/21/2024  Patient No:  [id number]  Physician:  PEDRO  YOB: 1931    Past Medical History (entered by Technologist):    Reason For Exam (entered by Technologist):  Lower abd pain, blood in urine, had tumor removed from bladder in OCTOBER  Other Notes (entered by Technologist): room 14/ 27251    Additional Information (per [facility]):        CT abdomen and pelvis with contrast    IMPRESSION:    No priors.    -There is mild hydroureteronephrosis bilaterally, without obstructive calculus.  Parenchymal calculi are noted in the right kidney, likely vascular.  There is a simple cyst in the inferior left kidney measuring 3.3 cm in diameter.    -The urinary bladder is distended and filled with dependently layering hyperdense material compatible with blood products.  The possibility of a bladder tumor within this hyperdense material is not excluded.  There is a Shields catheter in the bladder with balloon inflated within the hyperdense material. Mild perivesicular fat stranding is noted, however there is no bladder wall thickening.    Additional findings:  The liver, spleen, gallbladder, pancreas, and adrenals appear normal.  Diffuse calcification in the abdominal aorta without aneurysm.  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  The stomach, small bowel, and colon are within normal limits.  No ascites.  Enlarged prostate.  Advanced degenerative changes in the lumbar spine.  Mild height loss of the L5 vertebral body.  Left hip arthroplasty.      Report sent at 12:40 AM ET    Darion Wade MD  ED Medications Administered:   Medications   sodium chloride 0.9 % irrigation 3,000 mL (3,000 mL Irrigation New Bag 12/22/24 0026)   cefTRIAXone (Rocephin) 1 g in sodium chloride 0.9% 100 mL IVPB-ADDV (has no administration in time range)   sodium chloride 0.9 % IV bolus 1,000 mL (0 mL Intravenous Stopped 12/21/24 2246)   iopamidol 76% (ISOVUE-370) injection for power injector (70 mL Intravenous Given 12/21/24 2333)             MDM     Vitals:    12/21/24 2036 12/21/24 2230 12/21/24 2330 12/21/24 2345   BP: 123/85 129/68 121/63 126/70   Pulse: 98 77 64 73   Resp: 18 17 15 18   Temp: 97.9 °F (36.6 °C)      TempSrc: Oral      SpO2: 98% 98% 100% 100%     *I personally reviewed and interpreted all ED vitals.    Independent Interpretation of Studies: I have independently reviewed CT scan the abdomen pelvis.  Patient does have bladder distention with layering fluid consistent with blood products.  Catheter is in place within the bladder.    Social Determinants of Health:     Procedures:      Differential/MDM/Shared Decision Making: Differential Diagnosis includes gross hematuria, UTI, dehydration, electrolyte abnormality, anemia, others.      The patient already  has no past medical history on file.  to contribute to the complexity of this ED evaluation.           Medications, Diagnostics, or Disposition considered but not done:     Patient had three-way Shields catheter placed with significant gross hematuria and clots.  CBI was initiated.  Patient covered with IV ceftriaxone.  Lab work is remarkable for mild VERONICA and patient has a hemoglobin of 7.7 which is down from 10.71-month prior.  Performed rectal exam however patient had an empty vault.   Patient and family state that he has not had any dark-colored stool but did have some diarrhea last week that has since resolved.  CT scan was completed that only showed evidence of blood in the bladder without other significant findings.  Management of case was discussed with [facility] and they have excepted consult.  Discussed with [facility] hospitalist who has accepted patient for admission.  Patient is getting CBI at this time and urine is starting to clear.  Family has been updated and are agreeable with plan for admission for further management of gross hematuria and anemia.    Critical care time exclusive of procedure time spent on this patient was 35 min for taking history from patient examining patient, medical decision-making, reviewing lab work and radiology studies, explaining results to patient and family, discussing with consultants/admitting physician, and documenting in patient's chart.      Condition upon leaving the department: Stable    Disposition and Plan     Clinical Impression:  1. Gross hematuria    2. Anemia, unspecified type    3. VERONICA (acute kidney injury) (HCC)        Disposition:  Admit    Follow-up:  No follow-up provider specified.    Medications Prescribed:  Current Discharge Medication List          Hospital Problems       Present on Admission  Date Reviewed: 3/15/2023            ICD-10-CM Noted POA    * (Principal) Gross hematuria R31.0 12/22/2024 Unknown               [1] (Not in a hospital admission)

## 2024-12-22 NOTE — CONSULTS
[facility]  part of [facility]    Report of Consultation    Jeff Lamb Patient Status:  Inpatient    3/14/1931 MRN [id number]   Location [facility] 4W/SW/SE Attending Miroslava Mauro MD   Hosp Day # 0 PCP DEBRA DENNY MD     Date of Admission:  2024  Date of Consult:  24   Reason for Consultation:   Patient presented with increased confusion, restlessness, agitation, Miroslava Conteh MD requested psychiatric consult for evaluation and advice.    Consult Duration     The patient seen for initial psychiatric consult evaluation.   Record reviewed, communication with attending, communication with RN and patient seen face to face evaluation.    History of Present Illness:   Patient is a 89 year old , , male with past medical history of PAF, hemochromatosis, hypothyroidism,  who was admitted to the hospital for Gross hematuria: The patient has been demonstrating confusion, restlessness, agitation.  Patient indicated for psych consult for evaluation and advise.    Per chart review, the patient presented to the hospital by EMS for general weakness for a few days and blood in urine. Patient was admitted to the hospital and has been demonstrating some restlessness and agitation.    The patient received the following psychotropic medications aricept 5 mg, remeron 15 mg,     Labs and imaging reviewed: Glucose 116, sodium 139, hgb     The patient seen today sitting in hospital bed.    He presents restless, anxious and agitated.    Patient stating that he wants to get up and leave.     The patient is alert and oriented to person. The patient has difficulty answering questions and engaging in appropriate conversation due to confused and disorganized thought process.    Patient is not able to cooperate with interview to due confusion, restlessness, agitation.    The patient repeatedly stating that he needs to get up and go.     The patient has been demonstrating  delirium episode with alternation in mood and cognition with episodes of  increased confusion, restlessness, agitation and response to internal stimuli.     Collateral obtained from:     Communicating with patients daughter, patient lives at home with his wife. No diagnosis of dementia. No delirious episodes during previous admissions.     No reports of alcohol, tobacco, cannabis or illicit substance abuse.     Past Psychiatric/Medication History:  1. Prior diagnoses: denies  2. Past psychiatric inpatient: denies  3. Past outpatient history: denies  4. Past suicide history: denies  5. Medication history: patient prescribed remeron by PCP     Social History:   Patient lives at home with his wife. They have two children    No reports of alcohol, tobacco, cannabis or illicit substance abuse.    Family History:  None reported   Medical History:   Past Medical History  History reviewed. No pertinent past medical history.    Past Surgical History  History reviewed. No pertinent surgical history.    Family History  No family history on file.    Social History  Social History     Socioeconomic History    Marital status:    Tobacco Use    Smoking status: Unknown     Social Drivers of Health     Food Insecurity: No Food Insecurity (12/22/2024)    Food Insecurity     Food Insecurity: Never true   Transportation Needs: No Transportation Needs (12/22/2024)    Transportation Needs     Lack of Transportation: No   Housing Stability: Low Risk  (12/22/2024)    Housing Stability     Housing Instability: No           Current Medications:  Current Facility-Administered Medications   Medication Dose Route Frequency    donepezil (Aricept) tab 5 mg  5 mg Oral Nightly    levothyroxine (Synthroid) tab 25 mcg  25 mcg Oral Before breakfast    mirtazapine (Remeron) tab 15 mg  15 mg Oral Nightly    sodium chloride 0.9% infusion   Intravenous Continuous    acetaminophen (Tylenol Extra Strength) tab 500 mg  500 mg Oral Q4H PRN    [START ON  12/23/2024] cefTRIAXone (Rocephin) 2 g in sodium chloride 0.9% 100 mL IVPB-ADDV  2 g Intravenous Q24H    tamsulosin (Flomax) cap 0.4 mg  0.4 mg Oral Daily    sodium chloride 0.9 % IV bolus 250 mL  250 mL Intravenous Once    midodrine (ProAmatine) tab 5 mg  5 mg Oral Once    haloperidol lactate (Haldol) 5 MG/ML injection 1 mg  1 mg Intravenous Q6H PRN    sodium chloride 0.9 % irrigation 3,000 mL  3,000 mL Irrigation Continuous     Medications Prior to Admission   Medication Sig    ELIQUIS 5 MG Oral Tab Take 1 tablet (5 mg total) by mouth 2 (two) times daily.    furosemide 20 MG Oral Tab Take 1 tablet (20 mg total) by mouth daily.    tamsulosin 0.4 MG Oral Cap Take 1 capsule (0.4 mg total) by mouth daily.    donepezil 5 MG Oral Tab Take 1 tablet (5 mg total) by mouth nightly.    mirtazapine 15 MG Oral Tab Take 1 tablet (15 mg total) by mouth. At Bedtime    Levothyroxine Sodium (SYNTHROID, LEVOTHROID) 25 MCG Oral Tab Take 1 tablet (25 mcg total) by mouth before breakfast.       Allergies  Allergies[1]    Review of Systems:   As by Admitting/Attending    Results:   Laboratory Data:  Lab Results   Component Value Date    WBC 8.9 12/22/2024    HGB 6.3 (LL) 12/22/2024    HCT 19.0 (L) 12/22/2024    .0 12/22/2024    CREATSERUM 2.31 (H) 12/22/2024    BUN 36 (H) 12/22/2024     12/22/2024    K 4.8 12/22/2024     12/22/2024    CO2 21.0 12/22/2024     (H) 12/22/2024    CA 9.1 12/22/2024    ALB 4.8 12/21/2024    ALKPHO 47 12/21/2024    TP 7.2 12/21/2024    AST 33 12/21/2024    ALT 20 12/21/2024    PTT 30.2 11/24/2015    INR 1.1 11/24/2015    TSH 2.673 05/03/2024    CRP <0.5 11/24/2015    B12 692 09/11/2019         Imaging:  CT ABDOMEN+PELVIS(CONTRAST ONLY)(CPT=74177)    Result Date: 12/22/2024  CONCLUSION:  1. Mild-to-moderate bilateral hydroureteronephrosis to the level of the urinary bladder with a large bladder hematoma partially surrounding a Shields catheter.  Marked bladder distension despite the  Shields catheter.  Urology evaluation recommended to evaluate the urinary bladder. 2. Nonobstructing renal calculi.    Dictated by (CST): Ramiro Jama MD on 12/22/2024 at 5:58 AM     Finalized by (CST): Ramiro Jama MD on 12/22/2024 at 6:09 AM           Vital Signs:   Blood pressure 98/40, pulse 78, temperature 98.6 °F (37 °C), temperature source Axillary, resp. rate 15, weight 74.9 kg (165 lb 2 oz), SpO2 100%.    Mental Status Exam:   Appearance: Stated age male, in hospital gown, laying down in hospital bed.  Psychomotor: Patient has been demonstrating restlessness and agitation.  Orientation: Alert and oriented to person. Patient confused.  Gait: Not evaluated.  Attitude/Coorperation: limited cooperation due to confusion, restlessness, agitation  Behavior: episodes of restlessness and agitation.  Speech: Regular rate and rhythm speech.  Mood: anxious  Affect: restricted  Thought process: Confused, disorganized  Thought content: No reports of  suicidal or homicidal ideation.  Perceptions: Patient has been demonstrating response to internal stimuli.  Concentration: Grossly impaired  Memory: Grossly impaired  Intellect: Average.  Judgment and Insight: Questionable.     Impression:     Delirium due to another medical condition    Gross hematuria    Anemia, unspecified type    VERONICA (acute kidney injury) (HCC)    Patient is a 89 year old , , male with past medical history of PAF, hemochromatosis, hypothyroidism,  who was admitted to the hospital for Gross hematuria: The patient has been demonstrating confusion, restlessness, agitation.    The patient has been demonstrating delirium episode with alternation in mood and cognition with episodes of  increased confusion, restlessness, agitation and response to internal stimuli.     Discussed risk and benefit, acknowledging the current symptom and severity.  At this point, I would recommend the following approach:     Focus on safety  Focus on education and  support.  Focus on insight orientation helping the patient understand diagnosis and treatment plan.  Continue Aricept 5 mg   Utilize haldol 2 mg every six hours   Start zyprex 5 mg nightly  Utilize ativan 0.5 mg IV q 6 hours PRN  Continue Remeron 15 mg nightly  Processed with patient at length, the initiation of the above psychotropic medications I advised the patient of the risks, benefits, alternatives and potential side effects. The patient consents to administration of the medications and understands the right to refuse medications at any time. The patient verbalized understanding.   Coordinate plan with team    Orders This Visit:  Orders Placed This Encounter   Procedures    Urinalysis with Culture Reflex    CBC With Differential With Platelet    Comp Metabolic Panel (14)    RBC Morphology Scan    Scan slide    MD BLOOD SMEAR CONSULT    Procalcitonin    UA Microscopic only, urine    Ictotest    CBC, Platelet; No Differential    Basic Metabolic Panel (8)    CBC, Platelet; No Differential    Hemoglobin & Hematocrit    Type and screen    ABORH (Blood Type)    Antibody Screen    Prepare RBC Once    Urine Culture, Routine    Clostridium difficile(toxigenic)PCR       Meds This Visit:  Requested Prescriptions      No prescriptions requested or ordered in this encounter       NINFA Mora  12/22/2024    Note to Patient: The 21st Century Cures Act makes medical notes like these available to patients in the interest of transparency. However, be advised this is a medical document. It is intended as peer to peer communication. It is written in medical language and may contain abbreviations or verbiage that are unfamiliar. It may appear blunt or direct. Medical documents are intended to carry relevant information, facts as evident, and the clinical opinion of the practitioner. This note may have been transcribed using a voice dictation system. Voice recognition errors may occur. This should not be taken to alter the  content or meaning of this note.           [1] No Known Allergies

## 2024-12-22 NOTE — PROGRESS NOTES
Post midnight followup     89 yo male with hx of Bladder cancer and recent TURBT in Oct 2024, presented with Anemia 2/2 gross hematuria, AMS, and VERONICA.      CT abdomen and pelvis showing clots in the bladder with bilateral hydroureteronephrosis.     - receiving bladder irrigation  - 1u pRBC transfusing  - CT head ordered for agitation, confusion this morning, showed incidental subdural hematoma. NSGY consulted and aware.  - urology consulted, planing for OR today for clot removal.        Will resume care in AM.

## 2024-12-22 NOTE — ED QUICK NOTES
Orders for admission, patient is aware of plan and ready to go upstairs. Any questions, please call ED CANDIS pate at extension [phone].     Patient Covid vaccination status: Fully vaccinated     COVID Test Ordered in ED: None    COVID Suspicion at Admission: N/A    Running Infusions:    sodium chloride          Mental Status/LOC at time of transport: ao x 3-4    Other pertinent information:   CIWA score: N/A   NIH score:  N/A

## 2024-12-22 NOTE — PHYSICAL THERAPY NOTE
PT orders received and chart reviewed. Patient with subtherapeutic hemoglobin level (6.3). Per rehab protocol, will hold therapy evaluation until hemoglobin stable (>7). Will follow up at later date as appropriate and able.     Gemini Pichardo, PT

## 2024-12-23 ENCOUNTER — APPOINTMENT (OUTPATIENT)
Dept: CT IMAGING | Facility: HOSPITAL | Age: 89
DRG: 662 | End: 2024-12-23
Payer: MEDICARE

## 2024-12-23 ENCOUNTER — APPOINTMENT (OUTPATIENT)
Dept: CT IMAGING | Facility: HOSPITAL | Age: 89
End: 2024-12-23
Payer: MEDICARE

## 2024-12-23 PROBLEM — S06.5XAA SUBDURAL HEMATOMA (HCC): Status: ACTIVE | Noted: 2024-12-23

## 2024-12-23 LAB
ALBUMIN SERPL-MCNC: 3.8 G/DL (ref 3.2–4.8)
ANION GAP SERPL CALC-SCNC: 9 MMOL/L (ref 0–18)
BASOPHILS # BLD AUTO: 0.03 X10(3) UL (ref 0–0.2)
BASOPHILS # BLD AUTO: 0.04 X10(3) UL (ref 0–0.2)
BASOPHILS NFR BLD AUTO: 0.3 %
BASOPHILS NFR BLD AUTO: 0.4 %
BLOOD TYPE BARCODE: 600
BUN BLD-MCNC: 28 MG/DL (ref 9–23)
BUN/CREAT SERPL: 21.7 (ref 10–20)
CALCIUM BLD-MCNC: 8.9 MG/DL (ref 8.7–10.4)
CHLORIDE SERPL-SCNC: 108 MMOL/L (ref 98–112)
CO2 SERPL-SCNC: 23 MMOL/L (ref 21–32)
CREAT BLD-MCNC: 1.29 MG/DL
DEPRECATED RDW RBC AUTO: 88.1 FL (ref 35.1–46.3)
DEPRECATED RDW RBC AUTO: 89.4 FL (ref 35.1–46.3)
EGFRCR SERPLBLD CKD-EPI 2021: 52 ML/MIN/1.73M2 (ref 60–?)
EOSINOPHIL # BLD AUTO: 0.01 X10(3) UL (ref 0–0.7)
EOSINOPHIL # BLD AUTO: 0.04 X10(3) UL (ref 0–0.7)
EOSINOPHIL NFR BLD AUTO: 0.1 %
EOSINOPHIL NFR BLD AUTO: 0.5 %
ERYTHROCYTE [DISTWIDTH] IN BLOOD BY AUTOMATED COUNT: 23.6 % (ref 11–15)
ERYTHROCYTE [DISTWIDTH] IN BLOOD BY AUTOMATED COUNT: 23.9 % (ref 11–15)
GLUCOSE BLD-MCNC: 147 MG/DL (ref 70–99)
HCT VFR BLD AUTO: 21.6 %
HCT VFR BLD AUTO: 22.8 %
HGB BLD-MCNC: 7.3 G/DL
HGB BLD-MCNC: 7.7 G/DL
IMM GRANULOCYTES # BLD AUTO: 0.07 X10(3) UL (ref 0–1)
IMM GRANULOCYTES # BLD AUTO: 0.18 X10(3) UL (ref 0–1)
IMM GRANULOCYTES NFR BLD: 0.8 %
IMM GRANULOCYTES NFR BLD: 1.6 %
LYMPHOCYTES # BLD AUTO: 0.56 X10(3) UL (ref 1–4)
LYMPHOCYTES # BLD AUTO: 0.61 X10(3) UL (ref 1–4)
LYMPHOCYTES NFR BLD AUTO: 4.9 %
LYMPHOCYTES NFR BLD AUTO: 6.9 %
MCH RBC QN AUTO: 34.1 PG (ref 26–34)
MCH RBC QN AUTO: 34.5 PG (ref 26–34)
MCHC RBC AUTO-ENTMCNC: 33.8 G/DL (ref 31–37)
MCHC RBC AUTO-ENTMCNC: 33.8 G/DL (ref 31–37)
MCV RBC AUTO: 100.9 FL
MCV RBC AUTO: 102.2 FL
MONOCYTES # BLD AUTO: 1.53 X10(3) UL (ref 0.1–1)
MONOCYTES # BLD AUTO: 2.4 X10(3) UL (ref 0.1–1)
MONOCYTES NFR BLD AUTO: 17.4 %
MONOCYTES NFR BLD AUTO: 21.2 %
NEUTROPHILS # BLD AUTO: 6.53 X10 (3) UL (ref 1.5–7.7)
NEUTROPHILS # BLD AUTO: 6.53 X10(3) UL (ref 1.5–7.7)
NEUTROPHILS # BLD AUTO: 8.15 X10 (3) UL (ref 1.5–7.7)
NEUTROPHILS # BLD AUTO: 8.15 X10(3) UL (ref 1.5–7.7)
NEUTROPHILS NFR BLD AUTO: 71.8 %
NEUTROPHILS NFR BLD AUTO: 74.1 %
OSMOLALITY SERPL CALC.SUM OF ELEC: 298 MOSM/KG (ref 275–295)
PHOSPHATE SERPL-MCNC: 3.7 MG/DL (ref 2.4–5.1)
PLATELET # BLD AUTO: 280 10(3)UL (ref 150–450)
PLATELET # BLD AUTO: 405 10(3)UL (ref 150–450)
PLATELET MORPHOLOGY: NORMAL
POTASSIUM SERPL-SCNC: 4.1 MMOL/L (ref 3.5–5.1)
RBC # BLD AUTO: 2.14 X10(6)UL
RBC # BLD AUTO: 2.23 X10(6)UL
SODIUM SERPL-SCNC: 140 MMOL/L (ref 136–145)
UNIT VOLUME: 350 ML
WBC # BLD AUTO: 11.3 X10(3) UL (ref 4–11)
WBC # BLD AUTO: 8.8 X10(3) UL (ref 4–11)

## 2024-12-23 PROCEDURE — 70450 CT HEAD/BRAIN W/O DYE: CPT

## 2024-12-23 PROCEDURE — 99233 SBSQ HOSP IP/OBS HIGH 50: CPT | Performed by: HOSPITALIST

## 2024-12-23 PROCEDURE — 99223 1ST HOSP IP/OBS HIGH 75: CPT

## 2024-12-23 NOTE — ANESTHESIA PREPROCEDURE EVALUATION
Anesthesia PreOp Note    HPI:     Jeff Lamb is a 89 year old male who presents for preoperative consultation requested by: Allyson Luna DO    Date of Surgery: 12/22/2024    Procedure(s):  CYSTOSCOPY, CLOT EVACUATION, POSSIBLE FULGURATION  Indication: Gross hematuria [R31.0]    Relevant Problems   No relevant active problems       NPO:  Last Liquid Consumption Date: 12/22/24  Last Liquid Consumption Time: 0600 (sip with meds)  Last Solid Consumption Date: 12/20/24     Last Liquid Consumption Date: 12/22/24          History Review:  Patient Active Problem List    Diagnosis Date Noted    Gross hematuria 12/22/2024    Anemia, unspecified type 12/22/2024    VERONICA (acute kidney injury) (HCC) 12/22/2024    Delirium due to another medical condition 12/22/2024    Sensorineural hearing loss, bilateral 03/15/2023    Hereditary hemochromatosis (HCC) 07/23/2019       History reviewed. No pertinent past medical history.    History reviewed. No pertinent surgical history.    Prescriptions Prior to Admission[1]  Current Medications and Prescriptions Ordered in Epic[2]    Allergies[3]    No family history on file.  Social History     Socioeconomic History    Marital status:    Tobacco Use    Smoking status: Unknown       Available pre-op labs reviewed.  Lab Results   Component Value Date    WBC 9.5 12/22/2024    RBC 2.23 (L) 12/22/2024    HGB 7.4 (L) 12/22/2024    HCT 22.4 (L) 12/22/2024    .4 (H) 12/22/2024    MCH 33.2 12/22/2024    MCHC 33.0 12/22/2024    RDW 22.5 (H) 12/22/2024    .0 12/22/2024     Lab Results   Component Value Date     12/22/2024    K 4.8 12/22/2024     12/22/2024    CO2 21.0 12/22/2024    BUN 36 (H) 12/22/2024    CREATSERUM 2.31 (H) 12/22/2024     (H) 12/22/2024    PGLU 165 (H) 12/21/2024    CA 9.1 12/22/2024          Vital Signs:  Body mass index is 22.39 kg/m².   weight is 74.9 kg (165 lb 2 oz). His temporal temperature is 97.6 °F (36.4 °C). His blood pressure is  115/72 and his pulse is 84. His respiration is 16 and oxygen saturation is 96%.   Vitals:    12/22/24 0905 12/22/24 0958 12/22/24 1213 12/22/24 1448   BP: 128/75 98/40 105/48 115/72   Pulse: 99 78 83 84   Resp: 20 15 18 16   Temp: 97.8 °F (36.6 °C) 98.6 °F (37 °C)  97.6 °F (36.4 °C)   TempSrc: Tympanic Axillary  Temporal   SpO2: 96% 100% 100% 96%   Weight:            Anesthesia Evaluation     Patient summary reviewed and Nursing notes reviewed    No history of anesthetic complications   Airway   Mallampati: unable to assess  TM distance: >3 FB  Neck ROM: full  Dental - Dentition appears grossly intact     Pulmonary - normal exam   Cardiovascular - normal exam    Neuro/Psych      GI/Hepatic/Renal    (+) liver disease (hereditary hemochromatosis), chronic renal disease ARF    Endo/Other    Abdominal                  Anesthesia Plan:   ASA:  3  Emergent    Plan:   General  Airway:  LMA  Post-op Pain Management: Oral pain medication and IV analgesics  Informed Consent Plan and Risks Discussed With:  Child/children      I have informed Jeff Lamb and/or legal guardian or family member of the nature of the anesthetic plan, benefits, risks including possible dental damage if relevant, major complications, and any alternative forms of anesthetic management.   All of the patient's questions were answered to the best of my ability. The patient desires the anesthetic management as planned.  Sheela Spain MD  12/22/2024 6:05 PM  Present on Admission:   Gross hematuria           [1]   Medications Prior to Admission   Medication Sig Dispense Refill Last Dose/Taking    ELIQUIS 5 MG Oral Tab Take 1 tablet (5 mg total) by mouth 2 (two) times daily.       furosemide 20 MG Oral Tab Take 1 tablet (20 mg total) by mouth daily.       tamsulosin 0.4 MG Oral Cap Take 1 capsule (0.4 mg total) by mouth daily.       donepezil 5 MG Oral Tab Take 1 tablet (5 mg total) by mouth nightly.       mirtazapine 15 MG Oral Tab Take 1 tablet (15 mg  total) by mouth. At Bedtime  1     Levothyroxine Sodium (SYNTHROID, LEVOTHROID) 25 MCG Oral Tab Take 1 tablet (25 mcg total) by mouth before breakfast. 1 tablet 0    [2]   Current Facility-Administered Medications Ordered in Epic   Medication Dose Route Frequency Provider Last Rate Last Admin    [Transfer Hold] donepezil (Aricept) tab 5 mg  5 mg Oral Nightly Belen Melo MD        [Transfer Hold] levothyroxine (Synthroid) tab 25 mcg  25 mcg Oral Before breakfast Belen Melo MD   25 mcg at 12/22/24 0606    [Transfer Hold] mirtazapine (Remeron) tab 15 mg  15 mg Oral Nightly Belen Melo MD        sodium chloride 0.9% infusion   Intravenous Continuous Belen Melo MD 75 mL/hr at 12/22/24 0952 New Bag at 12/22/24 0952    [Transfer Hold] acetaminophen (Tylenol Extra Strength) tab 500 mg  500 mg Oral Q4H PRN Belen Melo MD        [START ON 12/23/2024] cefTRIAXone (Rocephin) 2 g in sodium chloride 0.9% 100 mL IVPB-ADDV  2 g Intravenous Q24H Belen Melo MD        [Transfer Hold] tamsulosin (Flomax) cap 0.4 mg  0.4 mg Oral Daily Belen Melo MD   0.4 mg at 12/22/24 0606    [Transfer Hold] midodrine (ProAmatine) tab 5 mg  5 mg Oral Once Miroslava Mauro MD        [Transfer Hold] LORazepam (Ativan) 2 mg/mL injection 0.5 mg  0.5 mg Intravenous Q6H PRN Sebo, Millie, APRN   0.5 mg at 12/22/24 1249    [Transfer Hold] OLANZapine (ZyPREXA Zydis) disintegrating tab 5 mg  5 mg Oral Nightly Sebo, Millie, APRN        [Transfer Hold] haloperidol lactate (Haldol) 5 MG/ML injection 2 mg  2 mg Intravenous Q6H PRN Sebo, Millie, APRN   2 mg at 12/22/24 1456    sodium chloride 0.9 % irrigation 3,000 mL  3,000 mL Irrigation Continuous Belen Melo MD   3,000 mL at 12/22/24 1727     No current [facility]-ordered outpatient medications on file.   [3] No Known Allergies

## 2024-12-23 NOTE — PLAN OF CARE
Problem: PAIN - ADULT  Goal: Verbalizes/displays adequate comfort level or patient's stated pain goal  Description: INTERVENTIONS:  - Encourage pt to monitor pain and request assistance  - Assess pain using appropriate pain scale  - Administer analgesics based on type and severity of pain and evaluate response  - Implement non-pharmacological measures as appropriate and evaluate response  - Consider cultural and social influences on pain and pain management  - Manage/alleviate anxiety  - Utilize distraction and/or relaxation techniques  - Monitor for opioid side effects  - Notify MD/LIP if interventions unsuccessful or patient reports new pain  - Anticipate increased pain with activity and pre-medicate as appropriate  Outcome: Progressing     Problem: RISK FOR INFECTION - ADULT  Goal: Absence of fever/infection during anticipated neutropenic period  Description: INTERVENTIONS  - Monitor WBC  - Administer growth factors as ordered  - Implement neutropenic guidelines  Outcome: Progressing     Problem: SAFETY ADULT - FALL  Goal: Free from fall injury  Description: INTERVENTIONS:  - Assess pt frequently for physical needs  - Identify cognitive and physical deficits and behaviors that affect risk of falls.  - [place] fall precautions as indicated by assessment.  - Educate pt/family on patient safety including physical limitations  - Instruct pt to call for assistance with activity based on assessment  - Modify environment to reduce risk of injury  - Provide assistive devices as appropriate  - Consider OT/PT consult to assist with strengthening/mobility  - Encourage toileting schedule  Outcome: Progressing     Problem: DISCHARGE PLANNING  Goal: Discharge to home or other facility with appropriate resources  Description: INTERVENTIONS:  - Identify barriers to discharge w/pt and caregiver  - Include patient/family/discharge partner in discharge planning  - Arrange for needed discharge resources and transportation as  appropriate  - Identify discharge learning needs (meds, wound care, etc)  - Arrange for interpreters to assist at discharge as needed  - Consider post-discharge preferences of patient/family/discharge partner  - Complete POLST form as appropriate  - Assess patient's ability to be responsible for managing their own health  - Refer to Case Management Department for coordinating discharge planning if the patient needs post-hospital services based on physician/LIP order or complex needs related to functional status, cognitive ability or social support system  Outcome: Progressing     Problem: GENITOURINARY - ADULT  Goal: Absence of urinary retention  Description: INTERVENTIONS:  - Assess patient’s ability to void and empty bladder  - Monitor intake/output and perform bladder scan as needed  - Follow urinary retention protocol/standard of care  - Consider collaborating with pharmacy to review patient's medication profile  - Implement strategies to promote bladder emptying  Outcome: Progressing   Pt more alert now. Worked with therapy and he is up in chair.pt is talking and cooperative with care. C B I is clamped and urine looks clear no bleeding noted at this time.family at bed side and he is talking with them.plan is to remove umaña cath tomorrow and pending discharge home vs [facility].

## 2024-12-23 NOTE — OPERATIVE REPORT
[facility]  part of Located within [facility]    Operative Note         Jeff Lamb Location: OR   [facility] [id number] MRN [id number]   Admission Date 12/21/2024 Operation Date 12/22/2024   Attending Physician Miroslava Mauro MD       Patient Name: Jeff Lamb     Preoperative Diagnosis: Gross hematuria [R31.0]     Postoperative Diagnosis: Gross hematuria [R31.0]     Procedure(s):  CYSTOSCOPY, CLOT EVACUATION,  FULGURATION OF BLEEDING     Primary Surgeon: Allyson Luna DO           Anesthesia: General     Specimen: none    Estimated Blood Loss: 100ml clot  Complications: none      Surgical Findings:   -100 cc of dense clot evacuated from bladder.  A little bit of oozing coming from the trigonal area which is where previous biopsy was.  This area was fulgurated and no other bleeding was identified.  -24 Kazakh three-way catheter left in place with CBI draining clear urine    Operative Summary:      89-year-old male with history of bladder cancer status post recent TURBT presents to the hospital with gross hematuria.  A 20 Kazakh three-way Shields catheter was placed in the ER but this clotted off and patient was very difficult to irrigate at bedside given his dementia.  CT abdomen pelvis was done which showed large dense clot the bladder.  Given the above it was discussed with family and decided to take patient to the operating room for the above said procedure.  Explained the risks benefits and alternatives of the procedure and signed consent was placed in chart.    Patient was brought to the operating room and placed under continuous pulse oximetry and cardiac monitoring by the anesthesia staff.  He was given Ancef preoperative antibiotics.  He was placed in the dorsolithotomy position prepped and draped in a normal sterile fashion and a timeout was performed.    The resectoscope was used with a 26 Kazakh sheath.  The urethra was without stricture.  The prostate showed trilobar enlargement and hypervascularity but  no active bleeding.  Upon coming into the bladder dense clot was identified.  All of this clot was irrigated copiously out of the bladder until it was completely clear.  There is about 100 cc of clot.  There was a small area of oozing noted in the middle of the trigone which is where previous biopsy was.  This area was fulgurated using the rollerball.  No other active bleeding was identified and both ureteral orifice ease were intact.  The scope was then removed and a 24 Vietnamese three-way catheter was placed with 10 cc of sterile water in the balloon.  CBI was started and urine was clear.    Implants: * No implants in log *     Drains: 24 Vietnamese three-way Shields catheter CBI    Condition: Stable    Plan:  -As long as urine stays clear in the a.m. we will clamp his CBI.  If urine remains clear we will take Shields catheter out tomorrow      Allyson Luna DO

## 2024-12-23 NOTE — PLAN OF CARE
Problem: Patient Centered Care  Goal: Patient preferences are identified and integrated in the patient's plan of care  Description: Interventions:  - What would you like us to know as we care for you? Per family patient agitated prior to procedure.   - Provide timely, complete, and accurate information to patient/family  - Incorporate patient and family knowledge, values, beliefs, and cultural backgrounds into the planning and delivery of care  - Encourage patient/family to participate in care and decision-making at the level they choose  - Honor patient and family perspectives and choices  Outcome: Progressing     Problem: Patient/Family Goals  Goal: Patient/Family Long Term Goal  Description: Patient's Long Term Goal: Per family return to home    Interventions:  - Monitoring CBI/urine output  - See additional Care Plan goals for specific interventions  Outcome: Progressing  Goal: Patient/Family Short Term Goal  Description: Patient's Short Term Goal: Remove umaña     Interventions:   - Monitoring CBI urine output  - See additional Care Plan goals for specific interventions  Outcome: Progressing     Problem: PAIN - ADULT  Goal: Verbalizes/displays adequate comfort level or patient's stated pain goal  Description: INTERVENTIONS:  - Encourage pt to monitor pain and request assistance  - Assess pain using appropriate pain scale  - Administer analgesics based on type and severity of pain and evaluate response  - Implement non-pharmacological measures as appropriate and evaluate response  - Consider cultural and social influences on pain and pain management  - Manage/alleviate anxiety  - Utilize distraction and/or relaxation techniques  - Monitor for opioid side effects  - Notify MD/LIP if interventions unsuccessful or patient reports new pain  - Anticipate increased pain with activity and pre-medicate as appropriate  Outcome: Progressing  Note: Not verbalizing pain at this time. Very drowsy post procedure.       Problem: RISK FOR INFECTION - ADULT  Goal: Absence of fever/infection during anticipated neutropenic period  Description: INTERVENTIONS  - Monitor WBC  - Administer growth factors as ordered  - Implement neutropenic guidelines  Outcome: Progressing  Note: Afebrile. Last WBC 9.5 Remains on Daily IV Rocephin post procedure.        Problem: SAFETY ADULT - FALL  Goal: Free from fall injury  Description: INTERVENTIONS:  - Assess pt frequently for physical needs  - Identify cognitive and physical deficits and behaviors that affect risk of falls.  - [place] fall precautions as indicated by assessment.  - Educate pt/family on patient safety including physical limitations  - Instruct pt to call for assistance with activity based on assessment  - Modify environment to reduce risk of injury  - Provide assistive devices as appropriate  - Consider OT/PT consult to assist with strengthening/mobility  - Encourage toileting schedule  Outcome: Progressing  Flowsheets (Taken 12/23/2024 0039)  Additional Interventions for High Risk:   Call Light Return  Demonstration   Bed Alarm/I-bed awareness   Room near nurses' station   Family Member to Stay at Bedside   [place] on and door open     Problem: DISCHARGE PLANNING  Goal: Discharge to home or other facility with appropriate resources  Description: INTERVENTIONS:  - Identify barriers to discharge w/pt and caregiver  - Include patient/family/discharge partner in discharge planning  - Arrange for needed discharge resources and transportation as appropriate  - Identify discharge learning needs (meds, wound care, etc)  - Arrange for interpreters to assist at discharge as needed  - Consider post-discharge preferences of patient/family/discharge partner  - Complete POLST form as appropriate  - Assess patient's ability to be responsible for managing their own health  - Refer to Case Management Department for coordinating discharge planning if the patient needs post-hospital services  based on physician/LIP order or complex needs related to functional status, cognitive ability or social support system  Outcome: Progressing  Note: DC plan TBD     Problem: GENITOURINARY - ADULT  Goal: Absence of urinary retention  Description: INTERVENTIONS:  - Assess patient’s ability to void and empty bladder  - Monitor intake/output and perform bladder scan as needed  - Follow urinary retention protocol/standard of care  - Consider collaborating with pharmacy to review patient's medication profile  - Implement strategies to promote bladder emptying  Outcome: Progressing  Note: CBI remains at moderate to slow rate. Urine clear yellow.

## 2024-12-23 NOTE — ANESTHESIA POSTPROCEDURE EVALUATION
Patient: Jeff Lamb    Procedure Summary       Date: 12/22/24 Room / Location: [facility] MAIN OR  / [facility] MA[facility] OR    Anesthesia Start: 1844 Anesthesia Stop: 1925    Procedure: CYSTOSCOPY, CLOT EVACUATION,  FULGURATION OF BLEEDING (Bladder) Diagnosis:       Gross hematuria      (Gross hematuria [R31.0])    Surgeons: Allyson Luna DO Anesthesiologist: Sheela Spain MD    Anesthesia Type: general ASA Status: 3 - Emergent            Anesthesia Type: general    Vitals Value Taken Time   /57 12/22/24 2023   Temp 97.3 °F (36.3 °C) 12/22/24 2113   Pulse 82 12/22/24 2220   Resp 16 12/22/24 2113   SpO2 100 % 12/22/24 2113   Vitals shown include unfiled device data.    [facility] AN Post Evaluation:   Patient Evaluated in PACU  Patient Participation: complete - patient cannot participate  Level of Consciousness: awake and confused  Pain Score: 0  Pain Management: adequate  Airway Patency:patent  Dental exam unchanged from preop  Yes    Nausea/Vomiting: none  Cardiovascular Status: acceptable  Respiratory Status: acceptable  Postoperative Hydration acceptable      Sheela Spain MD  12/22/2024 10:20 PM

## 2024-12-23 NOTE — PROGRESS NOTES
[facility]  part of [facility]     Progress Note    Jeff Lamb Patient Status:  Inpatient    3/14/1931 MRN [id number]   Location [facility] 4W/SW/SE Attending Patrice Madrid MD   Hosp Day # 1 PCP DEBRA DENNY MD     Subjective:  Jeff Lamb is a(n) 89 year old male.    Current  complaints: good    Medications:  Current Facility-Administered Medications   Medication Dose Route Frequency    donepezil (Aricept) tab 5 mg  5 mg Oral Nightly    levothyroxine (Synthroid) tab 25 mcg  25 mcg Oral Before breakfast    mirtazapine (Remeron) tab 15 mg  15 mg Oral Nightly    sodium chloride 0.9% infusion   Intravenous Continuous    acetaminophen (Tylenol Extra Strength) tab 500 mg  500 mg Oral Q4H PRN    tamsulosin (Flomax) cap 0.4 mg  0.4 mg Oral Daily    midodrine (ProAmatine) tab 5 mg  5 mg Oral Once    LORazepam (Ativan) 2 mg/mL injection 0.5 mg  0.5 mg Intravenous Q6H PRN    OLANZapine (ZyPREXA Zydis) disintegrating tab 5 mg  5 mg Oral Nightly    haloperidol lactate (Haldol) 5 MG/ML injection 2 mg  2 mg Intravenous Q6H PRN    sodium chloride 0.9 % irrigation 3,000 mL  3,000 mL Irrigation Continuous     Objective:  /67 (BP Location: Right arm)   Pulse 85   Temp 98.8 °F (37.1 °C) (Temporal)   Resp 16   Wt 165 lb 2 oz (74.9 kg)   SpO2 100%   BMI 22.39 kg/m²     Intake/Output Summary (Last 24 hours) at 2024 1457  Last data filed at 2024 1456  Gross per 24 hour   Intake 818.75 ml   Output 2100 ml   Net -1281.25 ml       General Appearance: Alert, cooperative, no distress, appears stated age  Head: Normocephalic, without obvious abnormality, atraumatic  Abdomen: Soft, non-tender, bowel sounds active all four quadrants, no masses,  no organomegaly  Urine: Clear per CBI            Lab Results   Component Value Date    WBC 8.8 2024    HGB 7.3 2024    HCT 21.6 2024    .0 2024    CREATSERUM 1.29 2024    BUN 28 2024      12/23/2024    K 4.1 12/23/2024     12/23/2024    CO2 23.0 12/23/2024     12/23/2024    CA 8.9 12/23/2024          Assessment:  Hematuria    Plan:  -weaned off of CBI  -void trial in     Daniel Rodriguez MD  12/23/2024  2:57 PM

## 2024-12-23 NOTE — CONSULTS
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  Repeat CTH stable.  Denies CP, SOB, HA, visual change.    History:  History reviewed. No pertinent past medical history.  History reviewed. No pertinent surgical history.  No family history on file.       Allergies:  Allergies[1]    Medications:    Current Facility-Administered Medications:     donepezil (Aricept) tab 5 mg, 5 mg, Oral, Nightly    levothyroxine (Synthroid) tab 25 mcg, 25 mcg, Oral, Before breakfast    mirtazapine (Remeron) tab 15 mg, 15 mg, Oral, Nightly    sodium chloride 0.9% infusion, , Intravenous, Continuous    acetaminophen (Tylenol Extra Strength) tab 500 mg, 500 mg, Oral, Q4H PRN    tamsulosin (Flomax) cap 0.4 mg, 0.4 mg, Oral, Daily    midodrine (ProAmatine) tab 5 mg, 5 mg, Oral, Once    LORazepam (Ativan) 2 mg/mL injection 0.5 mg, 0.5 mg, Intravenous, Q6H PRN    OLANZapine (ZyPREXA Zydis) disintegrating tab 5 mg, 5 mg, Oral, Nightly    haloperidol lactate (Haldol) 5 MG/ML injection 2 mg, 2 mg, Intravenous, Q6H PRN    Shields / urology care, , , Until Discontinued **AND** Shields / urology care, , , Continuous **AND** sodium chloride 0.9 % irrigation 3,000 mL, 3,000 mL, Irrigation, Continuous    Review of Systems:  A 10-point system was reviewed.  Pertinent positives and negatives are noted in HPI.      Physical Exam:  Temp:  [97.3 °F (36.3 °C)-99.4 °F (37.4 °C)] 98.8 °F (37.1  °C)  Pulse:  [69-89] 85  Resp:  [13-20] 16  BP: ()/(32-67) 106/67  SpO2:  [99 %-100 %] 100 %  I/O last 3 completed shifts:  In: 2260.4 [I.V.:1718.8; Blood:341.7; IV PIGGYBACK:200]  Out: 3625 [Urine:3625]      Neurological Exam:  Follows commands  PERRL  EOMI  Face symmetric  Facial sensation intact    Moving all 4 extremities x 4  No pronator drift    Abdomen:  Soft, non-distended, non-tender, with no rebound or guarding.  No peritoneal signs.     Extremities:  Non-tender, no lower extremity edema noted.      Labs:  Lab Results   Component Value Date    WBC 8.8 12/23/2024    HGB 7.3 (L) 12/23/2024    .0 12/23/2024    BUN 28 (H) 12/23/2024     12/23/2024    K 4.1 12/23/2024    CO2 23.0 12/23/2024     (H) 12/23/2024    ALB 3.8 12/23/2024    PTT 30.2 11/24/2015    INR 1.1 11/24/2015    PT 13.7 11/24/2015    CRP <0.5 11/24/2015       Imaging:  CT BRAIN OR HEAD (CPT=70450)    Result Date: 12/23/2024  CONCLUSION:  1. Stable exam.  Tiny 3-4 mm thickness curvilinear hyperdensity at the left paramedian vertex is unchanged since previous day head CT.  This could relate to an incidental vascular structure or trace acute subdural and/or subarachnoid hemorrhage.  Comparison with any available more remote exams would be helpful to assess stability.  No new or worsening intracranial hemorrhage.  No mass effect or midline shift. 2. Nonspecific white matter changes involving both cerebral hemispheres that most likely reflect sequelae of chronic microangiopathy. 3. Intracranial atherosclerosis. 4. Lesser incidental findings as above.   A preliminary report was issued by the [facility] teleradiology service. There are no major discrepancies.  elm-remote  Dictated by (CST): Horacio Downs MD on 12/23/2024 at 9:47 AM     Finalized by (CST): Horacio Downs MD on 12/23/2024 at 9:52 AM          CT BRAIN OR HEAD (CPT=70450)    Result Date: 12/22/2024  CONCLUSION:  1. A small 3 mm thick left parietal  subdural hematoma or less likely a thickened dural membrane related to a remote hematoma.  No hydrocephalus or midline shift.  Suggest follow-up CT in 12 hours.  Findings were called to doctor's to [facility]. 2. Moderate to advanced changes of chronic small vessel disease in both cerebral hemispheres.  New line large vessel intracranial atherosclerosis. 3. Large vessel intracranial atherosclerosis.     Dictated by (CST): Ramiro Jama MD on 12/22/2024 at 12:03 PM     Finalized by (CST): Ramiro Jama MD on 12/22/2024 at 12:12 PM          CT ABDOMEN+PELVIS(CONTRAST ONLY)(CPT=74177)    Result Date: 12/22/2024  CONCLUSION:  1. Mild-to-moderate bilateral hydroureteronephrosis to the level of the urinary bladder with a large bladder hematoma partially surrounding a Shields catheter.  Marked bladder distension despite the Shields catheter.  Urology evaluation recommended to evaluate the urinary bladder. 2. Nonobstructing renal calculi.    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  Repeat CTH stable.  No need for neurosurgical intervention at this time  Hold all anticoagulation  DVT prophylaxis SCDs  PT/OT  Medical management per hospitalist team  Patient to follow-up as an outpatient in 2 weeks with a repeat CT head prior to determining reinitiation of anticoagulants    More than 60 minutes were spent in consultation and coordination of this patient's care. All questions and concerns were addressed. We appreciate the  opportunity to participate in the care of this patient. Please do not hesitate to call our office ([phone]) with any issues.    Vilal Germain PA-C  12/23/2024  4:12 PM       [1] No Known Allergies

## 2024-12-23 NOTE — PROGRESS NOTES
[facility]  part of [facility]    Progress Note    Jeff Lamb Patient Status:  Inpatient    3/14/1931 MRN [id number]   Location [facility] 4W/SW/SE Attending Patrice Madrid MD   Hosp Day # 1 PCP DEBRA DENNY MD       Subjective:   Jeff Lamb is a(n) 89 year old male was seen and examined  Resting in bed comfortably  No acute distress   Hematuria resolved  No cp, sob, f,c,n,v, abd pain or HA     Objective:   Blood pressure 106/67, pulse 85, temperature 98.8 °F (37.1 °C), temperature source Temporal, resp. rate 16, weight 165 lb 2 oz (74.9 kg), SpO2 100%.    General: No acute distress.    HEENT: Normocephalic, atraumatic.  Neck: Supple.  Respiratory: Normal effort.  CTAB  Cardiovascular: S1, S2 regular.  Normal rate.   Abdomen: Soft, Nontender distended.  Neurologic: Alert, Nonfocal.  Musculoskeletal: No tenderness or deformity.  Extremities: No edema  : Three-way Shields catheter/CBI. Yellow urine   Psychiatric: calm, cooperative, pleasant        Current Inpatient Medications:     Current Facility-Administered Medications:     donepezil (Aricept) tab 5 mg, 5 mg, Oral, Nightly    levothyroxine (Synthroid) tab 25 mcg, 25 mcg, Oral, Before breakfast    mirtazapine (Remeron) tab 15 mg, 15 mg, Oral, Nightly    sodium chloride 0.9% infusion, , Intravenous, Continuous    acetaminophen (Tylenol Extra Strength) tab 500 mg, 500 mg, Oral, Q4H PRN    tamsulosin (Flomax) cap 0.4 mg, 0.4 mg, Oral, Daily    midodrine (ProAmatine) tab 5 mg, 5 mg, Oral, Once    LORazepam (Ativan) 2 mg/mL injection 0.5 mg, 0.5 mg, Intravenous, Q6H PRN    OLANZapine (ZyPREXA Zydis) disintegrating tab 5 mg, 5 mg, Oral, Nightly    haloperidol lactate (Haldol) 5 MG/ML injection 2 mg, 2 mg, Intravenous, Q6H PRN    Shields / urology care, , , Until Discontinued **AND** Shields / urology care, , , Continuous **AND** sodium chloride 0.9 % irrigation 3,000 mL, 3,000 mL, Irrigation, Continuous    Assessment and Plan:   Gross  hematuria  IMPROVED  Bladder cancer with most recent TURBT 10/1/2024  Urology was consulted, started on CBI  Now s/p CYSTOSCOPY, CLOT EVACUATION, FULGURATION OF BLEEDING POD#1  CBI is clamped currently and urine is clear  Rec'd IV rocephin  Hold eliquis  Currently holding off on treatment of bladder CA/BCG per family/chart review     Altered mental status, possibly metabolic/toxic  RESOLVING  -Empiric antibiotics   -Monitor     Acute blood loss on chronic anemia.  Previous hemoglobin 10.7  -Type, screen.  Transfuse to keep hemoglobin greater than 7  -Discussed with family, agreeable  -hgb stable this AM     Acute kidney injury.  Last creatinine 1.07  -Likely due to obstruction from clots  -Cr normalized this AM     History of PAF on Eliquis  -Hold Eliquis  -Continue other home medications  -cardiology consult for further evaluation and management      Hypothyroidism  History of hemochromatosis  -Continue home meds     Quality:  DVT Prophylaxis: CT  CODE status: Presumed full code  SANDOVAL: TBD    Results:     Recent Labs   Lab 12/21/24 2038 12/22/24  0355 12/22/24  1056   RBC 2.23* 1.91* 2.23*   HGB 7.7* 6.3* 7.4*   HCT 22.8* 19.0* 22.4*   .2* 99.5 100.4*   MCH 34.5* 33.0 33.2   MCHC 33.8 33.2 33.0   RDW 23.9* 23.9* 22.5*   NEPRELIM 8.15*  --   --    WBC 11.3* 8.9 9.5   .0 341.0 296.0         Recent Labs   Lab 12/21/24 2038 12/22/24  0355   * 116*   BUN 33* 36*   CREATSERUM 1.73* 2.31*   EGFRCR 36* 26*   CA 10.1 9.1    139   K 4.2 4.8    107   CO2 20.0* 21.0         Imaging:   CT BRAIN OR HEAD (CPT=70450)    Result Date: 12/23/2024  CONCLUSION:  1. Stable exam.  Tiny 3-4 mm thickness curvilinear hyperdensity at the left paramedian vertex is unchanged since previous day head CT.  This could relate to an incidental vascular structure or trace acute subdural and/or subarachnoid hemorrhage.  Comparison with any available more remote exams would be helpful to assess stability.  No new or  worsening intracranial hemorrhage.  No mass effect or midline shift. 2. Nonspecific white matter changes involving both cerebral hemispheres that most likely reflect sequelae of chronic microangiopathy. 3. Intracranial atherosclerosis. 4. Lesser incidental findings as above.   A preliminary report was issued by the [facility] teleradiology service. There are no major discrepancies.  elm-remote  Dictated by (CST): Horacio Downs MD on 12/23/2024 at 9:47 AM     Finalized by (CST): Horacio Downs MD on 12/23/2024 at 9:52 AM          CT BRAIN OR HEAD (CPT=70450)    Result Date: 12/22/2024  CONCLUSION:  1. A small 3 mm thick left parietal subdural hematoma or less likely a thickened dural membrane related to a remote hematoma.  No hydrocephalus or midline shift.  Suggest follow-up CT in 12 hours.  Findings were called to doctor's to [facility]. 2. Moderate to advanced changes of chronic small vessel disease in both cerebral hemispheres.  New line large vessel intracranial atherosclerosis. 3. Large vessel intracranial atherosclerosis.     Dictated by (CST): Ramiro Jama MD on 12/22/2024 at 12:03 PM     Finalized by (CST): Ramiro Jama MD on 12/22/2024 at 12:12 PM          CT ABDOMEN+PELVIS(CONTRAST ONLY)(CPT=74177)    Result Date: 12/22/2024  CONCLUSION:  1. Mild-to-moderate bilateral hydroureteronephrosis to the level of the urinary bladder with a large bladder hematoma partially surrounding a Shields catheter.  Marked bladder distension despite the Shields catheter.  Urology evaluation recommended to evaluate the urinary bladder. 2. Nonobstructing renal calculi.    Dictated by (CST): Ramiro Jama MD on 12/22/2024 at 5:58 AM     Finalized by (CST): Ramiro Jama MD on 12/22/2024 at 6:09 AM                 Patrice Madrid MD  12/24/2024

## 2024-12-23 NOTE — HISTORICAL OFFICE NOTE
[facility]  [address], [address] [place] [address]  [phone]      Jeff Lamb  Consult  Demographics:  Name: Jeff Lamb YOB: 1931  Age: 89, Male Medical Record No: [id number]  Visited Date/Time: 03/04/2024 02:10 PM    Chief Complaints  Consult for eval and mgt of AF    History of Present Illness  The pt is a 89 year old with AF identified last month, noted by ECG, with controlled rates.  He has no chest pain, dyspnea, lightheadedness, or syncope.  He has no bleeding problems.  He is walking without assistance.  Cardiac risk factors Never smoked  Past Medical History  1.Unspecified atrial fibrillation  2.Right bundle branch block (RBBB) with left anterior fascicular block (LAFB)  3.Abnormal ECG  4.Hereditary hemochromatosis  5.Sensorineural hearing loss, bilateral  Family History  1. Father - No history of Family history of heart disease  2. Mother - No history of Family history of heart disease  Social History  Smoking status Never smoked  Tobacco usage - No (Non-smoker for personal reasons (finding))  Physical Examination  Constitutional 98% O2  Vitals Left Arm Sitting  / 72 mmHg, Pulse rate 93 bpm, Height in 5' 7\", BMI: 26.5, Weight in 169 lbs (or) 77 kgs and BSA : 1.92 cc/m²  General Appearance No Acute Distress and Well groomed  Head/Eyes/Ears/Nose/Mouth/Throat Sclera Clear and Mucous membranes Moist  Neck Normal carotid pulsations, No carotid bruits and No JVD  Respiratory Unlabored, Lungs clear with normal breath sounds and Equal bilaterally  Cardiovascular   Gastrointestinal Abdomen soft and Non-tender  Gait Normal gait  Upper Extremities No clubbing, No cyanosis and No edema  Skin Warm and dry  Neurologic / Psychiatric Alert and Oriented and Non-focal  Speech Normal speech  EKG/Other abnormalities  CV EXAM:  No JVP  Carotid pulses normal, no carotid bruits  Radial pulses normal  Irreg irreg, normal S1/S2, no murmur, rub, or gallop  No lower extremity  edema   Allergies  No known medication allergies.  Medications  1.donepeziL 10 mg tablet, Take 1 tablet orally once a day at night.  2.levothyroxine 50 mcg tablet, Take 1 tablet orally once a day.  3.mirtazapine 15 MG Oral Tab, Take 1 tablet (15 mg total) by mouth. At Bedtime  4.tamsulosin 0.4 mg capsule, Take 1 capsule orally once a day.  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  Rate control and rhythm control approaches were discussed in detail.   -Start Eliquis 5 mg two times a day  -No more climbing ladders or working at heights. -If gait becomes unstable, start using a cane or walker.  -Complete echocardiogram and 1-week MCT  -See me after testing  Labs and Diagnostics ordered  1.EKG (electrocardiogram) (Today)  Patient instructions  -Start Eliquis 5 mg two times a day  -No more climbing ladders or working at heights!  -If gait becomes unstable, start using a cane or walker  -Complete echocardiogram and 1-week ECG monitor  -See Dr Sears after all tests completd  CPOE Orders carried out by: Jaycee SWEENEY  Care Providers: Akira Sears MD, Jaycee SWEENEY and Nichol CHAVARRIA  Electronically Authenticated by  Akira Sears MD  03/11/2024 04:48:22 PM  Disclaimer: Components of this note were documented using voice recognition system and are subject to errors not corrected at proofreading. Contact the author of this note for any clarifications.

## 2024-12-23 NOTE — PHYSICAL THERAPY NOTE
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  Prior to admission, patient's baseline is supervision and PRN assist for ADL's and IADL's from spouse (also in her 90's). Ambulates without DME per dtr report but generalized limited mobility.  Patient is currently functioning below baseline with bed mobility, transfers, gait, stair negotiation, maintaining seated position, standing prolonged periods, and performing household tasks.  Patient is requiring moderate assist as a result of the following impairments: decreased functional strength, decreased endurance/aerobic capacity, impaired sitting and standing balance, decreased muscular endurance, cognitive deficits (A&O x 1 to self only), and medical status.  Physical Therapy will continue to follow for duration of hospitalization.    Patient will benefit from continued skilled PT Services to promote return to prior level of function and safety with continuous assistance and gradual rehabilitative therapy . Family is hopeful that pt will make progress to return home.     PLAN DURING HOSPITALIZATION  Nursing Mobility Recommendation :  (Assist x 2)  PT Device Recommendation: Rolling walker  PT Treatment Plan: Body mechanics;Bed mobility;Endurance;Patient education;Energy conservation;Family education;Gait training;Range of motion;Strengthening;Stoop training;Stair training;Transfer training;Balance training  Rehab Potential : Good  Frequency (Obs): 3-5x/week     PHYSICAL THERAPY MEDICAL/SOCIAL  HISTORY   Problem List  Principal Problem:    Gross hematuria  Active Problems:    Anemia, unspecified type    VERONICA (acute kidney injury) (HCC)    Delirium due to another medical condition      HOME SITUATION  Type of Home: House  Home Layout: One level  Stairs to Enter : 7   Railing: Yes              Lives With: Spouse    Drives: No   Patient Regularly Uses:  (? if pt has RW, per dtr not using prior to admission)     SUBJECTIVE  \"I want to go home\"     PHYSICAL THERAPY EXAMINATION   OBJECTIVE  Precautions: Bed/chair alarm;Hard of hearing  Fall Risk: High fall risk    WEIGHT BEARING RESTRICTION       PAIN ASSESSMENT  Ratin          COGNITION  Overall Cognitive Status:  Impaired  Arousal/Alertness:  lethargic  Following Commands:  follows multistep commands with increased time  Safety Judgement:  decreased awareness of need for safety  Awareness of Errors:  decreased awareness of errors   Awareness of Deficits:  decreased awareness of deficits    RANGE OF MOTION AND STRENGTH ASSESSMENT  Upper extremity ROM and strength are within functional limits   Lower extremity ROM is within functional limits   Lower extremity strength is impaired bilateral LE's: 3+/5    BALANCE  Static Sitting: Fair  Dynamic Sitting: Fair -  Static Standing: Poor +  Dynamic Standing: Poor      ACTIVITY TOLERANCE  Pulse: 85  Heart Rate Source: Monitor     BP: 106/67  BP Location: Right arm  BP Method: Automatic  Patient Position: Sitting    O2 WALK  Oxygen Therapy  SPO2% on Room Air at Rest: 100  SPO2% Ambulation on Room Air: 99    AM-PAC '6-Clicks' INPATIENT SHORT FORM - BASIC MOBILITY  How much difficulty does the patient currently have...  Patient Difficulty: Turning over in bed (including adjusting bedclothes, sheets and blankets)?: A Lot   Patient Difficulty: Sitting down on and standing up from a chair with arms (e.g., wheelchair, bedside commode, etc.): A Lot   Patient Difficulty: Moving from lying on back to sitting on the side of the  bed?: A Lot   How much help from another person does the patient currently need...   Help from Another: Moving to and from a bed to a chair (including a wheelchair)?: A Lot   Help from Another: Need to walk in hospital room?: A Lot   Help from Another: Climbing 3-5 steps with a railing?: Total     AM-PAC Score:  Raw Score: 11   Approx Degree of Impairment: 72.57%   Standardized Score (AM-PAC Scale): 33.86   CMS Modifier (G-Code): CL    FUNCTIONAL ABILITY STATUS  Functional Mobility/Gait Assessment  Gait Assistance: Moderate assistance  Distance (ft): 4ft bed>chair  Assistive Device: Rolling walker  Pattern: Shuffle (unsteady, flexed posture, abbreviated step length. Cues for upright posture and safe management of RW with turns.)  Supine to Sit: moderate assist, assist with LE's and trunk. HOB elevated to ease transfer task. Tolerated sitting EOB for 8 minutes requiring initial CGA for sitting balance, requiring Min A as pt began to fatigue.   Sit to Stand: moderate assist. Cues for appropriate UE positioning with transitional movements. Instruction on upright posture in standing. Min A to maintain static standing balance with bilateral UE support on RW.     Exercise/Education Provided:  Bed mobility  Body mechanics  Energy conservation  Functional activity tolerated  Gait training  Posture  ROM  Strengthening  Transfer training    Skilled Therapy Provided: Patient received supine in bed with dtr and family present. RN approved activity. Therapist educated pt on POC and physiological benefits of mobilization. Patient agreeable to participate with encouragement- pt perseverating on going home requiring frequent redirection. A&O x 1 to self only. Lethargic with alertness improving as activity initiated. Denies pain.     The patient's Approx Degree of Impairment: 72.57% has been calculated based on documentation in the [facility] '6 clicks' Inpatient Basic Mobility Short Form.  Research supports that patients with this level  of impairment may benefit from rehab.  Final disposition will be made by interdisciplinary medical team.    Patient End of Session: Up in chair;Needs met;Call light within reach;RN aware of session/findings;Alarm set;Family present    CURRENT GOALS  Goals to be met by: 1/6/25  Patient Goal Patient's self-stated goal is: return to PLOF   Goal #1 Patient is able to demonstrate supine - sit EOB @ level: supervision     Goal #1   Current Status    Goal #2 Patient is able to demonstrate transfers Sit to/from Stand at assistance level: CG with walker - rolling     Goal #2  Current Status    Goal #3 Patient is able to ambulate 100 feet with assist device: walker - rolling at assistance level: CGA   Goal #3   Current Status    Goal #4 Patient will negotiate 7 stairs/one curb w/ assistive device and CGA   Goal #4   Current Status    Goal #5 Patient to demonstrate independence with home activity/exercise instructions provided to patient in preparation for discharge.   Goal #5   Current Status      Patient Evaluation Complexity Level:  History Moderate - 1 or 2 personal factors and/or co-morbidities   Examination of body systems Moderate - addressing a total of 3 or more elements   Clinical Presentation  Moderate - Evolving   Clinical Decision Making  Moderate Complexity   Therapeutic Activity:  23 minutes

## 2024-12-23 NOTE — PAYOR COMM NOTE
--------------  ADMISSION REVIEW     [place] [facility] [place]:  [id number]  Authorization Number: [id number]    Admit date: 12/22/24  Admit time:  2:16 AM     12/21 Patient Seen in: [facility] Emergency Department    History     89-year-old male presents to the ED via EMS for general weakness for a few days and blood in the urine.  EMS states the patient had a tumor removed from the bladder in October.  Patient's daughters arrived at bedside and states that patient has had some intermittent blood in the urine but it seemed to be much worse than usual and this last 3 or 4 days.  Patient has had no energy and is having a difficult time walking without assistance now.  He has had loss of appetite in the last day.  No diarrhea, fever, chills, vomiting.    History from Independent Source: Initial history given by EMS and patient's daughter as stated in HPI    External Records Reviewed: On chart review, patient had transurethral resection of bladder tumor and cystoscopy on October 1 at [facility].  On chart review, patient has history of A-fib on Eliquis, hypothyroidism, bladder cancer, hemochromatosis, anemia.    Physical Exam     ED Triage Vitals [12/21/24 2036]   /85   Pulse 98   Resp 18   Temp 97.9 °F (36.6 °C)   Temp src Oral   SpO2 98 %   O2 Device None (Room air)     Physical Exam   Constitutional: AAOx3, well nourished, NAD  HEENT: Normocephalic, PERRLA, MMM  CV: s1s2+, RRR, no m/r/g, normal distal pulses  Pulmonary/Chest: CTA b/l with no rales, wheezes.  No chest wall tenderness  Abdominal: Mild suprapubic tenderness to palpation without rebound or guarding.  Nondistended. Soft. Bowel sounds are normal.   Neck/Back:   : Rectal exam completed with empty rectal vault  Musculoskeletal: Normal range of motion. No deformity.   Neurological: Awake, alert. Normal reflexes. No cranial nerve deficit.    Skin: Skin is warm and dry. No rash noted. No erythema.     Labs Reviewed   URINALYSIS WITH  CULTURE REFLEX - Abnormal; Notable for the following components:       Result Value    Urine Color Red (*)     Clarity Urine Turbid (*)     WBC Urine >50 (*)     RBC Urine >10 (*)     Bacteria Urine Rare (*)     All other components within normal limits   CBC WITH DIFFERENTIAL WITH PLATELET - Abnormal; Notable for the following components:    WBC 11.3 (*)     RBC 2.23 (*)     HGB 7.7 (*)     HCT 22.8 (*)     .2 (*)     MCH 34.5 (*)     RDW-SD 89.4 (*)     RDW 23.9 (*)     Neutrophil Absolute Prelim 8.15 (*)     All other components within normal limits   COMP METABOLIC PANEL (14) - Abnormal; Notable for the following components:    Glucose 176 (*)     CO2 20.0 (*)     BUN 33 (*)     Creatinine 1.73 (*)     Calculated Osmolality 298 (*)     eGFR-Cr 36 (*)     Bilirubin, Total 2.1 (*)     All other components within normal limits   PROCALCITONIN - Abnormal; Notable for the following components:    Procalcitonin 0.06 (*)     All other components within normal limits   UA MICROSCOPIC ONLY, URINE - Abnormal; Notable for the following components:    WBC Urine >50 (*)     RBC Urine >10 (*)     Bacteria Urine Rare (*)     All other components within normal limits   POCT GLUCOSE - Abnormal; Notable for the following components:    POC Glucose  165 (*)     All other components within normal limits   ICTOTEST - Normal     Monitor Interpretation:   normal sinus rhythm as interpreted by me.      CT abdomen and pelvis with contrast    -There is mild hydroureteronephrosis bilaterally, without obstructive calculus.  Parenchymal calculi are noted in the right kidney, likely vascular.  There is a simple cyst in the inferior left kidney measuring 3.3 cm in diameter.    -The urinary bladder is distended and filled with dependently layering hyperdense material compatible with blood products.  The possibility of a bladder tumor within this hyperdense material is not excluded.  There is a Shields catheter in the bladder with balloon  inflated within the hyperdense material. Mild perivesicular fat stranding is noted, however there is no bladder wall thickening.    Additional findings:  The liver, spleen, gallbladder, pancreas, and adrenals appear normal.  Diffuse calcification in the abdominal aorta without aneurysm.  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  The stomach, small bowel, and colon are within normal limits.  No ascites.  Enlarged prostate.  Advanced degenerative changes in the lumbar spine.  Mild height loss of the L5 vertebral body.  Left hip arthroplasty.    ED Medications Administered:   Medications   sodium chloride 0.9 % irrigation 3,000 mL (3,000 mL Irrigation New Bag 12/22/24 0026)   cefTRIAXone (Rocephin) 1 g in sodium chloride 0.9% 100 mL IVPB-ADDV (has no administration in time range)   sodium chloride 0.9 % IV bolus 1,000 mL (0 mL Intravenous Stopped 12/21/24 2246)     Independent Interpretation of Studies: I have independently reviewed CT scan the abdomen pelvis.  Patient does have bladder distention with layering fluid consistent with blood products.  Catheter is in place within the bladder.  .      Patient had three-way Shields catheter placed with significant gross hematuria and clots.  CBI was initiated.  Patient covered with IV ceftriaxone.  Lab work is remarkable for mild VERONICA and patient has a hemoglobin of 7.7 which is down from 10.71-month prior.  Performed rectal exam however patient had an empty vault.  Patient and family state that he has not had any dark-colored stool but did have some diarrhea last week that has since resolved.  CT scan was completed that only showed evidence of blood in the bladder without other significant findings.  Management of case was discussed with [facility] urology and they have excepted consult.  Discussed with [facility] who has accepted patient for admission.  Patient is  getting CBI at this time and urine is starting to clear.  Family has been updated and are agreeable with plan for admission for further management of gross hematuria and anemia.  Disposition and Plan     Clinical Impression:  1. Gross hematuria    2. Anemia, unspecified type    3. 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  Blood noticed the day prior.  Progressively worse.  Has had mild suprapubic discomfort.  He is confused today, seems to have been worsening over the last few days.  Per son, the last 2 days he has been obsessed with the house generator.     Most recent biopsy 10/1/2024.  Currently not on treatment.  At presentation to the ED he was noted with gross hematuria with clots.  Vital stable.  Labs with creatinine 1.73, hemoglobin 7.7, WBC 11.3  UA with WBC, RBC and rare bacteria.  A three-way Shields catheter was placed and CBI initiated.  Empiric antibiotics with ceftriaxone initiated.  Urology on consult.     He is going to be admitted for ongoing treatment.     No fever, chills, diarrhea, nausea, vomiting, chest pain, cough or difficulty breathing reported by family      History/Other:   Past Medical History:  Hemochromatosis  Carotid stenosis  Paroxysmal A-fib  Hypothyroidism  Prostate CA       /54   Pulse 67   Temp 97.9 °F (36.6 °C) (Oral)   Resp 16   SpO2 100%   General: No acute distress.    HEENT: Normocephalic, atraumatic.  Neck: Supple.  Respiratory: Normal effort.  CTAB  Cardiovascular: S1, S2 regular.  Normal rate.   Abdomen: Soft, Nontender distended.  Neurologic: Alert, confused.  Nonfocal.  Musculoskeletal: No tenderness or deformity.  Extremities: No  edema  : Three-way Shields catheter/CBI.  Bloody urine.   Psychiatric: Unable to assess.  Patient confused    Assessment & Plan:  Gross hematuria.  UTI versus other  Bladder cancer with most recent TURBT 10/1/2024  -Admit  -Continue CBI  -Empiric antibiotics coverage, obtain urine culture  -Urology consult  -Eliquis  -Currently holding off on treatment of bladder CA/BCG per family/chart review     Altered mental status, possibly metabolic/toxic  -Empiric antibiotics  -Monitor     Acute blood loss on chronic anemia.  Previous hemoglobin 10.7  -Type, screen.  Transfuse to keep hemoglobin greater than 7  -Discussed with family, agreeable     Acute kidney injury.  Last creatinine 1.07  -Likely due to obstruction from clots  -IV fluids     History of PAF on Eliquis  -Hold Eliquis  -Continue other home medications     Hypothyroidism  History of hemochromatosis  -Continue home meds      PSYCH:    History of Present Illness:   Patient is a 89 year old , , male with past medical history of PAF, hemochromatosis, hypothyroidism,  who was admitted to the hospital for Gross hematuria: The patient has been demonstrating confusion, restlessness, agitation.  Patient indicated for psych consult for evaluation and advise.     Per chart review, the patient presented to the hospital by EMS for general weakness for a few days and blood in urine. Patient was admitted to the hospital and has been demonstrating some restlessness and agitation.     The patient received the following psychotropic medications aricept 5 mg, remeron 15 mg,      Labs and imaging reviewed: Glucose 116, sodium 139, hgb      The patient seen today sitting in hospital bed.     He presents restless, anxious and agitated.     Patient stating that he wants to get up and leave.      The patient is alert and oriented to person. The patient has difficulty answering questions and engaging in appropriate conversation due to confused and disorganized thought  process.     Patient is not able to cooperate with interview to due confusion, restlessness, agitation.     The patient repeatedly stating that he needs to get up and go.      The patient has been demonstrating delirium episode with alternation in mood and cognition with episodes of  increased confusion, restlessness, agitation and response to internal stimuli.      Collateral obtained from:      Communicating with patients daughter, patient lives at home with his wife. No diagnosis of dementia. No delirious episodes during previous admissions.      No reports of alcohol, tobacco, cannabis or illicit substance abuse.       Component Value Date     WBC 8.9 12/22/2024     HGB 6.3 (LL) 12/22/2024     HCT 19.0 (L) 12/22/2024     .0 12/22/2024     CREATSERUM 2.31 (H) 12/22/2024     BUN 36 (H) 12/22/2024      12/22/2024     K 4.8 12/22/2024      12/22/2024     CO2 21.0 12/22/2024      (H) 12/22/2024     CA 9.1 12/22/2024     Mental Status Exam:   Appearance: Stated age male, in hospital gown, laying down in hospital bed.  Psychomotor: Patient has been demonstrating restlessness and agitation.  Orientation: Alert and oriented to person. Patient confused.  Gait: Not evaluated.  Attitude/Coorperation: limited cooperation due to confusion, restlessness, agitation  Behavior: episodes of restlessness and agitation.  Speech: Regular rate and rhythm speech.  Mood: anxious  Affect: restricted  Thought process: Confused, disorganized  Thought content: No reports of  suicidal or homicidal ideation.  Perceptions: Patient has been demonstrating response to internal stimuli.  Concentration: Grossly impaired  Memory: Grossly impaired  Intellect: Average.  Judgment and Insight: Questionable.      Impression:      Delirium due to another medical condition    Gross hematuria    Anemia, unspecified type    VERONICA (acute kidney injury) (HCC)     Patient is a 89 year old , , male with past medical  history of PAF, hemochromatosis, hypothyroidism,  who was admitted to the hospital for Gross hematuria: The patient has been demonstrating confusion, restlessness, agitation.     The patient has been demonstrating delirium episode with alternation in mood and cognition with episodes of  increased confusion, restlessness, agitation and response to internal stimuli.      Discussed risk and benefit, acknowledging the current symptom and severity.  At this point, I would recommend the following approach:      Focus on safety  Focus on education and support.  Focus on insight orientation helping the patient understand diagnosis and treatment plan.  Continue Aricept 5 mg   Utilize haldol 2 mg every six hours   Start zyprex 5 mg nightly  Utilize ativan 0.5 mg IV q 6 hours PRN  Continue Remeron 15 mg nightly  Processed with patient at length, the initiation of the above psychotropic medications I advised the patient of the risks, benefits, alternatives and potential side effects. The patient consents to administration of the medications and understands the right to refuse medications at any time. The patient verbalized understanding.   Coordinate plan with team       HOSPITALIST:        Post midnight followup     89 yo male with hx of Bladder cancer and recent TURBT in Oct 2024, presented with Anemia 2/2 gross hematuria, AMS, and VERONICA.       CT abdomen and pelvis showing clots in the bladder with bilateral hydroureteronephrosis.      - receiving bladder irrigation  - 1u pRBC transfusing  - CT head ordered for agitation, confusion this morning, showed incidental subdural hematoma. NSGY consulted and aware.  - urology consulted, planing for OR today for clot removal.              UROLOGY:    89-year-old male who recently just had a TURBT at [facility] presented with gross hematuria.  Patient has Ta high-grade bladder cancer with no invasion into the muscle.  Son and daughter are at bedside who give me all of history as patient is  disoriented and sleeping.  They stated that he just started to get confused yesterday and then they noticed the blood in the urine.     CT BRAIN OR HEAD  1. A small 3 mm thick left parietal subdural hematoma or less likely a thickened dural membrane related to a remote hematoma.  No hydrocephalus or midline shift.  Suggest follow-up CT in 12 hours.  Findings were called to doctor's to [facility]. 2. Moderate to advanced changes of chronic small vessel disease in both cerebral hemispheres.  New line large vessel intracranial atherosclerosis. 3. Large vessel intracranial atherosclerosis.    Impression:      89-year-old male with TA high-grade noninvasive bladder cancer and gross hematuria     CT abdomen and pelvis reviewed and large clots in the bladder with a distended bladder and bilateral hydroureteronephrosis.     Recommendations:  -Patient would not let me irrigate at bedside as he is disoriented and combative during irrigation.  Given this and CT findings we decided to take the patient back to the operating room for clot evacuation and fulguration of bleeding.  The son and daughter were explained the risks benefits and alternatives of the procedure and are in agreement    Operative Note           Preoperative Diagnosis: Gross hematuria [R31.0]      Postoperative Diagnosis: Gross hematuria [R31.0]      Procedure(s):  CYSTOSCOPY, CLOT EVACUATION,  FULGURATION OF BLEEDING   Anesthesia: General       Estimated Blood Loss: 100ml clot  Complications: none       Surgical Findings:   -100 cc of dense clot evacuated from bladder.  A little bit of oozing coming from the trigonal area which is where previous biopsy was.  This area was fulgurated and no other bleeding was identified.  -24 [place]ian three-way catheter left in place with CBI draining clear urine    Plan:  -As long as urine stays clear in the a.m. we will clamp his CBI.  If urine remains clear we will take Shields catheter out  tomorrow      12/23:    HOSPITALIST:      Subjective:   Jeff castillo a(n) 89 year old male was seen and examined  Resting in bed comfortably  No acute distress   Hematuria resolved  No cp, sob, f,c,n,v, abd pain or HA      Objective:   Blood pressure 106/67, pulse 85, temperature 98.8 °F (37.1 °C), temperature source Temporal, resp. rate 16, weight 165 lb 2 oz (74.9 kg), SpO2 100%.     General: No acute distress.    HEENT: Normocephalic, atraumatic.  Neck: Supple.  Respiratory: Normal effort.  CTAB  Cardiovascular: S1, S2 regular.  Normal rate.   Abdomen: Soft, Nontender distended.  Neurologic: Alert, Nonfocal.  Musculoskeletal: No tenderness or deformity.  Extremities: No edema  : Three-way Shields catheter/CBI. Yellow urine   Psychiatric: calm, cooperative, pleasant         Current Inpatient Medications:     Current Hospital Medications      Current Facility-Administered Medications:     donepezil (Aricept) tab 5 mg, 5 mg, Oral, Nightly    levothyroxine (Synthroid) tab 25 mcg, 25 mcg, Oral, Before breakfast    mirtazapine (Remeron) tab 15 mg, 15 mg, Oral, Nightly    sodium chloride 0.9% infusion, , Intravenous, Continuous    acetaminophen (Tylenol Extra Strength) tab 500 mg, 500 mg, Oral, Q4H PRN    tamsulosin (Flomax) cap 0.4 mg, 0.4 mg, Oral, Daily    midodrine (ProAmatine) tab 5 mg, 5 mg, Oral, Once    LORazepam (Ativan) 2 mg/mL injection 0.5 mg, 0.5 mg, Intravenous, Q6H PRN    OLANZapine (ZyPREXA Zydis) disintegrating tab 5 mg, 5 mg, Oral, Nightly    haloperidol lactate (Haldol) 5 MG/ML injection 2 mg, 2 mg, Intravenous, Q6H PRN    Shields / urology care, , , Until Discontinued **AND** Shields / urology care, , , Continuous **AND** sodium chloride 0.9 % irrigation 3,000 mL, 3,000 mL, Irrigation, Continuous        Assessment and Plan:   Gross hematuria  IMPROVED  Bladder cancer with most recent TURBT 10/1/2024  Urology was consulted, started on CBI  Now s/p CYSTOSCOPY, CLOT EVACUATION, FULGURATION OF BLEEDING  POD#1  CBI is clamped currently and urine is clear  Rec'd IV rocephin  Hold eliquis  Currently holding off on treatment of bladder CA/BCG per family/chart review     Altered mental status, possibly metabolic/toxic  RESOLVING  -Empiric antibiotics   -Monitor     Acute blood loss on chronic anemia.  Previous hemoglobin 10.7  -Type, screen.  Transfuse to keep hemoglobin greater than 7  -Discussed with family, agreeable  -hgb stable this AM     Acute kidney injury.  Last creatinine 1.07  -Likely due to obstruction from clots  -Cr normalized this AM     History of PAF on Eliquis  -Hold Eliquis  -Continue other home medications  -cardiology consult for further evaluation and management      Hypothyroidism  History of hemochromatosis  -Continue home meds      Lab 12/21/24 2038 12/22/24  0355 12/22/24  1056   RBC 2.23* 1.91* 2.23*   HGB 7.7* 6.3* 7.4*   HCT 22.8* 19.0* 22.4*   .2* 99.5 100.4*   MCH 34.5* 33.0 33.2   MCHC 33.8 33.2 33.0   RDW 23.9* 23.9* 22.5*   NEPRELIM 8.15*  --   --    WBC 11.3* 8.9 9.5   .0 341.0 296.0    CT BRAIN OR HEAD (CPT=70450)     Result Date: 12/23/2024  CONCLUSION:         1. Stable exam.  Tiny 3-4 mm thickness curvilinear hyperdensity at the left paramedian vertex is unchanged since previous day head CT.  This could relate to an incidental vascular structure or trace acute subdural and/or subarachnoid hemorrhage.  Comparison with any available more remote exams would be helpful to assess stability.  No new or worsening intracranial hemorrhage.  No mass effect or midline shift. 2. Nonspecific white matter changes involving both cerebral hemispheres that most likely reflect sequelae of chronic microangiopathy. 3. Intracranial atherosclerosis.        CARDS:      History of Present Illness:   Patient is a 89 year old male with significant past medical history of persistent atrial fibrillation, recently diagnosed bladder cancer who presents with generalized weakness and hematuria.  Cardiology consulted for management of anticoagulation in setting of hematuria.  Labs on presentation with hemoglobin of 6.3, given 1 unit pRBC -> has up trended to 7.3 this morning.  Underwent cystoscopy, clot evacuation and fulguration of bleeding source.  Currently on CBI. Source of bleed was determined to be at site of biopsy.  Patient was also found to have a left parietal subdural hematoma without shift, follow-up CT head during have been stable.  Discussed with patient and daughter, would like more information on possibility/probability of rebleeding at site of prior biopsy before determining decision whether to continue oral anticoagulation.     Assessment and Plan:   Gross hematuria secondary to biopsy site bleeding  SDH  Bladder Ca     Persistent atrial fibrillation  -currently rate controlled, rates 70s to 80s  -elevated [id number], given hemodynamically significant bleed from site of prior biopsy and left parietal SDH did discuss with patient and his daughter on whether to continue oral anticoagulation given his elevated bleeding risk albeit with acknowledgement of his elevated [id number], patient/daughter would like to discuss with urology on the possibility/probability of rebleeding at the site that was fulgurated, in the meantime continue holding oral anticoagulation; at minimum patient will need to be off anticoagulation for 2 weeks and would need repeat CT head  imaging per neurosurgery recommendations      UROLOGY:      Current Hospital Medications[]Expand by Default          Current Facility-Administered Medications   Medication Dose Route Frequency    donepezil (Aricept) tab 5 mg  5 mg Oral Nightly    levothyroxine (Synthroid) tab 25 mcg  25 mcg Oral Before breakfast    mirtazapine (Remeron) tab 15 mg  15 mg Oral Nightly    sodium chloride 0.9% infusion   Intravenous Continuous    acetaminophen (Tylenol Extra Strength) tab 500 mg  500 mg Oral Q4H PRN    tamsulosin (Flomax) cap 0.4 mg  0.4 mg  Oral Daily    midodrine (ProAmatine) tab 5 mg  5 mg Oral Once    LORazepam (Ativan) 2 mg/mL injection 0.5 mg  0.5 mg Intravenous Q6H PRN    OLANZapine (ZyPREXA Zydis) disintegrating tab 5 mg  5 mg Oral Nightly    haloperidol lactate (Haldol) 5 MG/ML injection 2 mg  2 mg Intravenous Q6H PRN    sodium chloride 0.9 % irrigation 3,000 mL  3,000 mL Irrigation Continuous         Objective:  /67 (BP Location: Right arm)   Pulse 85   Temp 98.8 °F (37.1 °C) (Temporal)   Resp 16   Wt 165 lb 2 oz (74.9 kg)   SpO2 100%   BMI 22.39 kg/m²       General Appearance: Alert, cooperative, no distress, appears stated age  Head: Normocephalic, without obvious abnormality, atraumatic  Abdomen: Soft, non-tender, bowel sounds active all four quadrants, no masses,  no organomegaly  Urine: Clear per CBI  Assessment:  Hematuria     Plan:  -weaned off of CBI  -void trial in AM      NEUROSURG:    Reason for Consultation:  SDH     History of Present Illness:  Jeff Lamb is a a(n) 89 year old male with history of PAF, hemochromatosis, hypothyroidism and 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  Repeat CTH stable.  Denies CP, SOB, HA, visual change.     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  Repeat CTH stable.  No need for neurosurgical intervention at this time  Hold all anticoagulation  DVT prophylaxis  SCDs  PT/OT  Medical management per hospitalist team  Patient to follow-up as an outpatient in 2 weeks with a repeat CT head prior to determining reinitiation of anticoagulants    MEDICATIONS ADMINISTERED IN LAST 1 DAY:  ceFAZolin (Ancef) injection       Date Action Dose Route User    12/22/2024 1856 Given 2 g Intravenous Sheela Spain MD          cefTRIAXone (Rocephin) 2 g in sodium chloride 0.9% 100 mL IVPB-ADDV       Date Action Dose Route User    12/23/2024 0030 New Bag 2 g Intravenous Nasrin Kulkarni RN       haloperidol lactate (Haldol) 5 MG/ML injection 2 mg       Date Action Dose Route User    12/22/2024 1456 Given 2 mg Intravenous HooksCuong alvarenga RN          lactated ringers infusion       Date Action Dose Route User    12/22/2024 1846 New Bag (none) Intravenous Sheela Spain MD       LORazepam (Ativan) 2 mg/mL injection 0.5 mg       Date Action Dose Route User    12/22/2024 1249 Given 0.5 mg Intravenous Cuong Hooks RN          ondansetron (Zofran) 4 MG/2ML injection       Date Action Dose Route User    12/22/2024 1911 Given 4 mg Intravenous Sheela Spain MD     sodium chloride 0.9 % irrigation 3,000 mL       Date Action Dose Route User    12/23/2024 0132 New Bag 3,000 mL Irrigation Nasrin Kulkarni RN    12/22/2024 2214 New Bag 3,000 mL Irrigation ToNasrin RN    12/22/2024 1727 New Bag 3,000 mL Irrigation HooksCuong hamilton RN    12/22/2024 1631 New Bag 3,000 mL Irrigation HooksCuong RN    12/22/2024 1429 New Bag 3,000 mL Irrigation HooksCuong hamilton RN    12/22/2024 1249 New Bag 3,000 mL Irrigation HooksCuong hamilton RN    12/22/2024 1126 New Bag 3,000 mL Irrigation HooksCuong hamilton RN    12/22/2024 1027 New Bag 3,000 mL Irrigation HooksCuong alvarenga RN          sodium chloride 0.9% infusion       Date Action Dose Route User    12/23/2024 0224 New Bag (none) Intravenous ToNasrin RN            Vitals (last day)       Date/Time Temp Pulse Resp BP SpO2 Weight  O2 Device O2 Flow Rate (L/min) [facility]    12/23/24 0751 -- 83 16 102/61 100 % -- None (Room air) -- JT    12/23/24 0401 98.8 °F (37.1 °C) 85 20 92/43 100 % -- None (Room air) -- TT    12/22/24 2340 97.3 °F (36.3 °C) 84 16 105/54 99 % -- None (Room air) -- TT    12/22/24 2113 97.3 °F (36.3 °C) 75 16 94/32 100 % -- Nasal cannula 1 L/min TT    12/22/24 2053 -- -- -- -- -- -- Nasal cannula 2 L/min     12/22/24 2043 -- -- -- -- 100 % -- Nasal cannula 2 L/min     12/22/24 2033 -- 86 14 -- 100 % -- Nasal cannula 2 L/min     12/22/24 2023 -- 74 13 109/57 100 % -- Nasal cannula 2 L/min     12/22/24 2013 -- 83 13 105/58 100 % -- Nasal cannula 2 L/min     12/22/24 2003 99.4 °F (37.4 °C) 69 20 106/58 100 % -- Nasal cannula 2 L/min     12/22/24 1953 -- 74 17 105/63 100 % -- Nasal cannula 2 L/min     12/22/24 1943 -- 89 14 101/52 100 % -- Nasal cannula 2 L/min     12/22/24 1933 -- 80 14 98/56 100 % -- Nasal cannula 2 L/min     12/22/24 1923 98.8 °F (37.1 °C) 79 16 111/65 100 % -- Nasal cannula 4 L/min     12/22/24 1448 -- 84 -- -- -- -- -- -- MB    12/22/24 1448 97.6 °F (36.4 °C) -- 16 115/72 96 % -- None (Room air) -- MH    12/22/24 1213 -- 83 18 105/48 100 % -- None (Room air) -- MB    12/22/24 0958 98.6 °F (37 °C) 78 15 98/40 100 % -- None (Room air) -- MB    12/22/24 0905 97.8 °F (36.6 °C) 99 20 128/75 96 % -- None (Room air) -- MB    12/22/24 0814 -- 81 -- 98/52 -- -- -- -- MB    12/22/24 0755 -- 81 -- 94/61 -- -- -- -- MB    12/22/24 0741 98.4 °F (36.9 °C) 76 -- 89/53 -- -- -- -- MB    12/22/24 0737 -- 74 15 86/46 100 % -- None (Room air) -- MB    12/22/24 0648 98.1 °F (36.7 °C) 81 16 97/64 100 % -- -- --     12/22/24 0633 98.2 °F (36.8 °C) 91 16 101/52 100 % -- -- --     12/22/24 0509 98 °F (36.7 °C) -- 18 99/59 97 % -- None (Room air) -- MA    12/22/24 0315 -- -- -- -- -- 165 lb 2 oz (74.9 kg) -- -- W    12/22/24 0227 98.2 °F (36.8 °C) 82 20 122/66 96 % 165 lb 2 oz (74.9 kg) None (Room air) --      12/22/24 0200 -- 73 21 111/56 -- -- None (Room air) --     12/22/24 0136 98.9 °F (37.2 °C) -- -- -- -- -- -- --     12/22/24 0115 -- 67 16 106/54 -- -- None (Room air) --        Blood Transfusion Record       Product Unit Status Volume Start End            Transfuse RBC       24  [id number]  [id number] Completed 12/22/24 1042 341.67 mL 12/22/24 0633 12/22/24 0958

## 2024-12-23 NOTE — CM/SW NOTE
ANU followed up on DC planning.     SW received call from RN stating pt's family is declining [facility] and would like HHC     Referral for HHC sent in aidin     Currently no accepting agenices at this time - referral reopened will need to review    PLAN: Home with HHC - pending accepting agency    Marimar SORIA, MSW ext. [phone]

## 2024-12-23 NOTE — PLAN OF CARE
Patient received one unit PRBCs this morning. Became very agitated at times, trying to get out of bed, pulling at catheter and IV, and kicking. Given IV Ativan and Haldol as needed. Psych consulted. CBI continued. Irrigated per orders. Loose bowel movement this morning, need sample for C. Dif PCR.     CT head completed- Neurosurgery to see patient tomorrow morning.     Family at bedside. Plan for OR this evening.     Problem: Patient Centered Care  Goal: Patient preferences are identified and integrated in the patient's plan of care  Description: Interventions:  - What would you like us to know as we care for you?   - Provide timely, complete, and accurate information to patient/family  - Incorporate patient and family knowledge, values, beliefs, and cultural backgrounds into the planning and delivery of care  - Encourage patient/family to participate in care and decision-making at the level they choose  - Honor patient and family perspectives and choices  Outcome: Progressing      Problem: PAIN - ADULT  Goal: Verbalizes/displays adequate comfort level or patient's stated pain goal  Description: INTERVENTIONS:  - Encourage pt to monitor pain and request assistance  - Assess pain using appropriate pain scale  - Administer analgesics based on type and severity of pain and evaluate response  - Implement non-pharmacological measures as appropriate and evaluate response  - Consider cultural and social influences on pain and pain management  - Manage/alleviate anxiety  - Utilize distraction and/or relaxation techniques  - Monitor for opioid side effects  - Notify MD/LIP if interventions unsuccessful or patient reports new pain  - Anticipate increased pain with activity and pre-medicate as appropriate  Outcome: Progressing     Problem: RISK FOR INFECTION - ADULT  Goal: Absence of fever/infection during anticipated neutropenic period  Description: INTERVENTIONS  - Monitor WBC  - Administer growth factors as ordered  -  Implement neutropenic guidelines  Outcome: Progressing     Problem: SAFETY ADULT - FALL  Goal: Free from fall injury  Description: INTERVENTIONS:  - Assess pt frequently for physical needs  - Identify cognitive and physical deficits and behaviors that affect risk of falls.  - [place] fall precautions as indicated by assessment.  - Educate pt/family on patient safety including physical limitations  - Instruct pt to call for assistance with activity based on assessment  - Modify environment to reduce risk of injury  - Provide assistive devices as appropriate  - Consider OT/PT consult to assist with strengthening/mobility  - Encourage toileting schedule  Outcome: Progressing     Problem: DISCHARGE PLANNING  Goal: Discharge to home or other facility with appropriate resources  Description: INTERVENTIONS:  - Identify barriers to discharge w/pt and caregiver  - Include patient/family/discharge partner in discharge planning  - Arrange for needed discharge resources and transportation as appropriate  - Identify discharge learning needs (meds, wound care, etc)  - Arrange for interpreters to assist at discharge as needed  - Consider post-discharge preferences of patient/family/discharge partner  - Complete POLST form as appropriate  - Assess patient's ability to be responsible for managing their own health  - Refer to Case Management Department for coordinating discharge planning if the patient needs post-hospital services based on physician/LIP order or complex needs related to functional status, cognitive ability or social support system  Outcome: Progressing

## 2024-12-23 NOTE — CONSULTS
[facility]  part of [facility]    Cardiology Consultation    Jeff Lamb Patient Status:  Inpatient    3/14/1931 MRN [id number]   Location [facility] 4W/SW/SE Attending Patrice Madrid MD   Hosp Day # 1 PCP DEBRA DENNY MD     Date of Admission:  2024  Date of Consult:  2024  Reason for Consultation: OAC management    History of Present Illness:   Patient is a 89 year old male with significant past medical history of persistent atrial fibrillation, recently diagnosed bladder cancer who presents with generalized weakness and hematuria. Cardiology consulted for management of anticoagulation in setting of hematuria.  Labs on presentation with hemoglobin of 6.3, given 1 unit pRBC -> has up trended to 7.3 this morning.  Underwent cystoscopy, clot evacuation and fulguration of bleeding source.  Currently on CBI. Source of bleed was determined to be at site of biopsy.  Patient was also found to have a left parietal subdural hematoma without shift, follow-up CT head during have been stable.  Discussed with patient and daughter, would like more information on possibility/probability of rebleeding at site of prior biopsy before determining decision whether to continue oral anticoagulation.    Assessment and Plan:   Gross hematuria secondary to biopsy site bleeding  SDH  Bladder Ca    Persistent atrial fibrillation  -currently rate controlled, rates 70s to 80s  -elevated [id number], given hemodynamically significant bleed from site of prior biopsy and left parietal SDH did discuss with patient and his daughter on whether to continue oral anticoagulation given his elevated bleeding risk albeit with acknowledgement of his elevated [id number], patient/daughter would like to discuss with urology on the possibility/probability of rebleeding at the site that was fulgurated, in the meantime continue holding oral anticoagulation; at minimum patient will need to be off  anticoagulation for 2 weeks and would need repeat CT head  imaging per neurosurgery recommendations    Past Medical History  History reviewed. No pertinent past medical history.    Past Surgical History  History reviewed. No pertinent surgical history.    Family History  No family history on file.    Social History  Pediatric History   Patient Parents    Not on file     Other Topics Concern    Not on file   Social History Narrative    Not on file           Current Medications:  Current Facility-Administered Medications   Medication Dose Route Frequency    donepezil (Aricept) tab 5 mg  5 mg Oral Nightly    levothyroxine (Synthroid) tab 25 mcg  25 mcg Oral Before breakfast    mirtazapine (Remeron) tab 15 mg  15 mg Oral Nightly    sodium chloride 0.9% infusion   Intravenous Continuous    acetaminophen (Tylenol Extra Strength) tab 500 mg  500 mg Oral Q4H PRN    tamsulosin (Flomax) cap 0.4 mg  0.4 mg Oral Daily    midodrine (ProAmatine) tab 5 mg  5 mg Oral Once    LORazepam (Ativan) 2 mg/mL injection 0.5 mg  0.5 mg Intravenous Q6H PRN    OLANZapine (ZyPREXA Zydis) disintegrating tab 5 mg  5 mg Oral Nightly    haloperidol lactate (Haldol) 5 MG/ML injection 2 mg  2 mg Intravenous Q6H PRN    sodium chloride 0.9 % irrigation 3,000 mL  3,000 mL Irrigation Continuous     Medications Prior to Admission   Medication Sig    ELIQUIS 5 MG Oral Tab Take 1 tablet (5 mg total) by mouth 2 (two) times daily.    furosemide 20 MG Oral Tab Take 1 tablet (20 mg total) by mouth daily.    tamsulosin 0.4 MG Oral Cap Take 1 capsule (0.4 mg total) by mouth daily.    donepezil 5 MG Oral Tab Take 1 tablet (5 mg total) by mouth nightly.    mirtazapine 15 MG Oral Tab Take 1 tablet (15 mg total) by mouth. At Bedtime    Levothyroxine Sodium (SYNTHROID, LEVOTHROID) 25 MCG Oral Tab Take 1 tablet (25 mcg total) by mouth before breakfast.       Allergies  Allergies[1]    Review of Systems:   ROS  10 point review of systems completed and negative except as  noted.    Physical Exam:   Patient Vitals for the past 24 hrs:   BP Temp Temp src Pulse Resp SpO2   12/23/24 1043 106/67 -- -- 85 -- --   12/23/24 0751 102/61 -- -- 83 16 100 %   12/23/24 0401 92/43 98.8 °F (37.1 °C) Temporal 85 20 100 %   12/22/24 2340 105/54 97.3 °F (36.3 °C) Temporal 84 16 99 %   12/22/24 2113 94/32 97.3 °F (36.3 °C) Temporal 75 16 100 %   12/22/24 2043 -- -- -- -- -- 100 %   12/22/24 2033 -- -- -- 86 14 100 %   12/22/24 2023 109/57 -- -- 74 13 100 %   12/22/24 2013 105/58 -- -- 83 13 100 %   12/22/24 2003 106/58 99.4 °F (37.4 °C) Temporal 69 20 100 %   12/22/24 1953 105/63 -- -- 74 17 100 %   12/22/24 1943 101/52 -- -- 89 14 100 %   12/22/24 1933 98/56 -- -- 80 14 100 %   12/22/24 1923 111/65 98.8 °F (37.1 °C) Temporal 79 16 100 %   12/22/24 1448 115/72 97.6 °F (36.4 °C) Temporal 84 16 96 %       Intake/Output:   Last 3 shifts:   Intake/Output                   12/21/24 0700 - 12/22/24 0659 12/22/24 0700 - 12/23/24 0659 12/23/24 0700 - 12/24/24 0659       Intake    P.O.  --  0  --    P.O. -- 0 --    I.V.  1000  718.8  --    I.V. -- 0 --    Volume (mL) (lactated ringers infusion) -- 420 --    Volume (mL) (sodium chloride 0.9 % IV bolus 1,000 mL) 1000 -- --    Volume (mL) (sodium chloride 0.9% infusion) -- 298.8 --    Blood  --  341.7  --    Volume (Transfuse RBC) -- 341.7 --    IV PIGGYBACK  100  100  --    Volume (mL) (cefTRIAXone (Rocephin) 1 g in sodium chloride 0.9% 100 mL IVPB-ADDV) 100 -- --    Volume (mL) (cefTRIAXone (Rocephin) 2 g in sodium chloride 0.9% 100 mL IVPB-ADDV) -- 100 --    Total Intake 1100 1160.4 --       Output    Urine  3275  350  --    CBI Net Output (mL) 3275 350 --    Stool  --  --  --    Stool Count Calculated for I/O -- 1 x --    Total Output 3275 350 --       Net I/O     -2175 810.4 --          Vent Settings:    Hemodynamic parameters (last 24 hours):    Scheduled Meds:    donepezil  5 mg Oral Nightly    levothyroxine  25 mcg Oral Before breakfast    mirtazapine  15  mg Oral Nightly    tamsulosin  0.4 mg Oral Daily    midodrine  5 mg Oral Once    OLANZapine  5 mg Oral Nightly     Continuous Infusions:    sodium chloride 75 mL/hr at 12/23/24 0224    sodium chloride         Physical Exam:  General: Alert and oriented x 3. No apparent distress.   HEENT: Normocephalic, anicteric sclera, neck supple, no thyromegaly or adenopathy.  Neck: No JVD, carotids 2+, no bruits.  Cardiac: Irreg irreg  Lungs: Clear without wheezes, rales, rhonchi or dullness.  Normal excursions and effort.  Abdomen: Soft, non-tender. No organosplenomegally, mass or rebound, BS-present.  Extremities: Without clubbing or cyanosis. No edema.    Neurologic: Alert and oriented, normal affect. No focal defects  Skin: Warm and dry.     Results:   Laboratory Data:  Lab Results   Component Value Date    WBC 8.8 12/23/2024    HGB 7.3 (L) 12/23/2024    HCT 21.6 (L) 12/23/2024    .0 12/23/2024    CREATSERUM 2.31 (H) 12/22/2024    BUN 36 (H) 12/22/2024     12/22/2024    K 4.8 12/22/2024     12/22/2024    CO2 21.0 12/22/2024     (H) 12/22/2024    CA 9.1 12/22/2024    ALB 4.8 12/21/2024    ALKPHO 47 12/21/2024    TP 7.2 12/21/2024    AST 33 12/21/2024    ALT 20 12/21/2024    PTT 30.2 11/24/2015    INR 1.1 11/24/2015    TSH 2.673 05/03/2024    CRP <0.5 11/24/2015    B12 692 09/11/2019         Recent Labs   Lab 12/21/24 2038 12/22/24  0355   * 116*   BUN 33* 36*   CREATSERUM 1.73* 2.31*   CA 10.1 9.1    139   K 4.2 4.8    107   CO2 20.0* 21.0     Recent Labs   Lab 12/21/24 2038 12/22/24  0355 12/22/24  1056 12/23/24  1221   RBC 2.23* 1.91* 2.23* 2.14*   HGB 7.7* 6.3* 7.4* 7.3*   HCT 22.8* 19.0* 22.4* 21.6*   .2* 99.5 100.4* 100.9*   MCH 34.5* 33.0 33.2 34.1*   MCHC 33.8 33.2 33.0 33.8   RDW 23.9* 23.9* 22.5* 23.6*   NEPRELIM 8.15*  --   --  6.53   WBC 11.3* 8.9 9.5 8.8   .0 341.0 296.0 280.0       No results for input(s): \"BNPML\" in the last 168 hours.    No results  for input(s): \"TROP\", \"CK\" in the last 168 hours.    Imaging:  CT BRAIN OR HEAD (CPT=70450)    Result Date: 12/23/2024  CONCLUSION:  1. Stable exam.  Tiny 3-4 mm thickness curvilinear hyperdensity at the left paramedian vertex is unchanged since previous day head CT.  This could relate to an incidental vascular structure or trace acute subdural and/or subarachnoid hemorrhage.  Comparison with any available more remote exams would be helpful to assess stability.  No new or worsening intracranial hemorrhage.  No mass effect or midline shift. 2. Nonspecific white matter changes involving both cerebral hemispheres that most likely reflect sequelae of chronic microangiopathy. 3. Intracranial atherosclerosis. 4. Lesser incidental findings as above.   A preliminary report was issued by the [facility] teleradiology service. There are no major discrepancies.  elm-remote  Dictated by (CST): Horacio Downs MD on 12/23/2024 at 9:47 AM     Finalized by (CST): Horacio Downs MD on 12/23/2024 at 9:52 AM           Thank you for allowing me to participate in the care of your patient.    Jeff Morales, DO  [facility]         [1] No Known Allergies

## 2024-12-24 LAB
ALBUMIN SERPL-MCNC: 3.5 G/DL (ref 3.2–4.8)
ANION GAP SERPL CALC-SCNC: 5 MMOL/L (ref 0–18)
BASOPHILS # BLD AUTO: 0.04 X10(3) UL (ref 0–0.2)
BASOPHILS NFR BLD AUTO: 0.6 %
BUN BLD-MCNC: 26 MG/DL (ref 9–23)
BUN/CREAT SERPL: 27.4 (ref 10–20)
C DIFF GDH + TOXINS A+B STL QL IA.RAPID: NOT DETECTED
C DIFF TOX B STL QL: POSITIVE
CALCIUM BLD-MCNC: 8.6 MG/DL (ref 8.7–10.4)
CHLORIDE SERPL-SCNC: 108 MMOL/L (ref 98–112)
CO2 SERPL-SCNC: 25 MMOL/L (ref 21–32)
CREAT BLD-MCNC: 0.95 MG/DL
DEPRECATED RDW RBC AUTO: 81.6 FL (ref 35.1–46.3)
DEPRECATED RDW RBC AUTO: 82 FL (ref 35.1–46.3)
EGFRCR SERPLBLD CKD-EPI 2021: 75 ML/MIN/1.73M2 (ref 60–?)
EOSINOPHIL # BLD AUTO: 0.12 X10(3) UL (ref 0–0.7)
EOSINOPHIL NFR BLD AUTO: 1.8 %
ERYTHROCYTE [DISTWIDTH] IN BLOOD BY AUTOMATED COUNT: 23 % (ref 11–15)
ERYTHROCYTE [DISTWIDTH] IN BLOOD BY AUTOMATED COUNT: 23.4 % (ref 11–15)
GLUCOSE BLD-MCNC: 94 MG/DL (ref 70–99)
HCT VFR BLD AUTO: 20.2 %
HCT VFR BLD AUTO: 23.2 %
HEMOCCULT STL QL: NEGATIVE
HGB BLD-MCNC: 6.9 G/DL
HGB BLD-MCNC: 7.9 G/DL
IMM GRANULOCYTES # BLD AUTO: 0.08 X10(3) UL (ref 0–1)
IMM GRANULOCYTES NFR BLD: 1.2 %
LYMPHOCYTES # BLD AUTO: 0.9 X10(3) UL (ref 1–4)
LYMPHOCYTES NFR BLD AUTO: 13.7 %
MAGNESIUM SERPL-MCNC: 2 MG/DL (ref 1.6–2.6)
MCH RBC QN AUTO: 32.8 PG (ref 26–34)
MCH RBC QN AUTO: 33.2 PG (ref 26–34)
MCHC RBC AUTO-ENTMCNC: 34.1 G/DL (ref 31–37)
MCHC RBC AUTO-ENTMCNC: 34.2 G/DL (ref 31–37)
MCV RBC AUTO: 96.3 FL
MCV RBC AUTO: 97.1 FL
MONOCYTES # BLD AUTO: 1.14 X10(3) UL (ref 0.1–1)
MONOCYTES NFR BLD AUTO: 17.4 %
NEUTROPHILS # BLD AUTO: 4.29 X10 (3) UL (ref 1.5–7.7)
NEUTROPHILS # BLD AUTO: 4.29 X10(3) UL (ref 1.5–7.7)
NEUTROPHILS NFR BLD AUTO: 65.3 %
OSMOLALITY SERPL CALC.SUM OF ELEC: 291 MOSM/KG (ref 275–295)
PHOSPHATE SERPL-MCNC: 2.6 MG/DL (ref 2.4–5.1)
PLATELET # BLD AUTO: 268 10(3)UL (ref 150–450)
PLATELET # BLD AUTO: 293 10(3)UL (ref 150–450)
POTASSIUM SERPL-SCNC: 3.9 MMOL/L (ref 3.5–5.1)
RBC # BLD AUTO: 2.08 X10(6)UL
RBC # BLD AUTO: 2.41 X10(6)UL
SODIUM SERPL-SCNC: 138 MMOL/L (ref 136–145)
WBC # BLD AUTO: 6.6 X10(3) UL (ref 4–11)
WBC # BLD AUTO: 6.8 X10(3) UL (ref 4–11)

## 2024-12-24 PROCEDURE — 99233 SBSQ HOSP IP/OBS HIGH 50: CPT | Performed by: HOSPITALIST

## 2024-12-24 RX ORDER — SODIUM CHLORIDE 9 MG/ML
INJECTION, SOLUTION INTRAVENOUS ONCE
Status: COMPLETED | OUTPATIENT
Start: 2024-12-24 | End: 2024-12-24

## 2024-12-24 NOTE — PROGRESS NOTES
[facility]  part of [facility]    Progress Note    Jeff Lamb Patient Status:  Inpatient    3/14/1931 MRN [id number]   Location [facility] 4W/SW/SE Attending Patrice Madrid MD   Hosp Day # 2 PCP DEBRA DENNY MD       Subjective:   Jeff Lamb is a(n) 89 year old male was seen and examined  Resting in chair comfortably  AAOX4. Family at bedside   No acute distress   Shields removed, pt is yet to void  No cp, sob, f,c,n,v, abd pain or HA     Objective:   Blood pressure 103/65, pulse 87, temperature 98.4 °F (36.9 °C), temperature source Oral, resp. rate 18, weight 165 lb 2 oz (74.9 kg), SpO2 100%.    General: No acute distress.    HEENT: Normocephalic, atraumatic.  Neck: Supple.  Respiratory: Normal effort.  CTAB  Cardiovascular: S1, S2 regular.  Normal rate.   Abdomen: Soft, Nontender distended.  Neurologic: Alert, Nonfocal.  Musculoskeletal: No tenderness or deformity.  Extremities: No edema  : Three-way Shields catheter/CBI. Yellow urine   Psychiatric: calm, cooperative, pleasant        Current Inpatient Medications:     Current Facility-Administered Medications:     donepezil (Aricept) tab 5 mg, 5 mg, Oral, Nightly    levothyroxine (Synthroid) tab 25 mcg, 25 mcg, Oral, Before breakfast    mirtazapine (Remeron) tab 15 mg, 15 mg, Oral, Nightly    sodium chloride 0.9% infusion, , Intravenous, Continuous    acetaminophen (Tylenol Extra Strength) tab 500 mg, 500 mg, Oral, Q4H PRN    tamsulosin (Flomax) cap 0.4 mg, 0.4 mg, Oral, Daily    midodrine (ProAmatine) tab 5 mg, 5 mg, Oral, Once    LORazepam (Ativan) 2 mg/mL injection 0.5 mg, 0.5 mg, Intravenous, Q6H PRN    OLANZapine (ZyPREXA Zydis) disintegrating tab 5 mg, 5 mg, Oral, Nightly    haloperidol lactate (Haldol) 5 MG/ML injection 2 mg, 2 mg, Intravenous, Q6H PRN    Shields / urology care, , , Until Discontinued **AND** Shields / urology care, , , Continuous **AND** sodium chloride 0.9 % irrigation 3,000 mL, 3,000 mL, Irrigation,  Continuous    Assessment and Plan:   Gross hematuria  RESOLVED  Bladder cancer with most recent TURBT 10/1/2024  Urology was consulted, started on CBI  Now s/p CYSTOSCOPY, CLOT EVACUATION, FULGURATION OF BLEEDING POD#2  CBI is clamped currently and urine is clear  Shields removed this AM, pt yet to void  Hold eliquis  Currently holding off on treatment of bladder CA/BCG per family/chart review     Altered mental status, possibly metabolic/toxic  RESOLVED  -Monitor     Acute blood loss on chronic anemia.  Previous hemoglobin 10.7  -Type, screen.  Transfuse to keep hemoglobin greater than 7  -Discussed with family, agreeable  -hgb 6.9 this AM, getting 1 U [facility]     Acute kidney injury.  Last creatinine 1.07  -Likely due to obstruction from clots  -Cr normal     History of PAF on Eliquis  -Hold Eliquis  -Continue other home medications  -cardiology consult for further evaluation and management      Hypothyroidism  History of hemochromatosis  -Continue home meds     Quality:  DVT Prophylaxis: CT  CODE status: Presumed full code  SANDOVAL: TBD    Results:     Recent Labs   Lab 12/21/24 2038 12/22/24  0355 12/23/24  1221 12/24/24  0356 12/24/24  0955   RBC 2.23*   < > 2.14* 2.08* 2.41*   HGB 7.7*   < > 7.3* 6.9* 7.9*   HCT 22.8*   < > 21.6* 20.2* 23.2*   .2*   < > 100.9* 97.1 96.3   MCH 34.5*   < > 34.1* 33.2 32.8   MCHC 33.8   < > 33.8 34.2 34.1   RDW 23.9*   < > 23.6* 23.0* 23.4*   NEPRELIM 8.15*  --  6.53 4.29  --    WBC 11.3*   < > 8.8 6.6 6.8   .0   < > 280.0 293.0 268.0    < > = values in this interval not displayed.         Recent Labs   Lab 12/22/24 0355 12/23/24  1221 12/24/24  0356   * 147* 94   BUN 36* 28* 26*   CREATSERUM 2.31* 1.29 0.95   EGFRCR 26* 52* 75   CA 9.1 8.9 8.6*    140 138   K 4.8 4.1 3.9    108 108   CO2 21.0 23.0 25.0         Imaging:   CT BRAIN OR HEAD (CPT=70450)    Result Date: 12/23/2024  CONCLUSION:  1. Stable exam.  Tiny 3-4 mm thickness curvilinear hyperdensity  at the left paramedian vertex is unchanged since previous day head CT.  This could relate to an incidental vascular structure or trace acute subdural and/or subarachnoid hemorrhage.  Comparison with any available more remote exams would be helpful to assess stability.  No new or worsening intracranial hemorrhage.  No mass effect or midline shift. 2. Nonspecific white matter changes involving both cerebral hemispheres that most likely reflect sequelae of chronic microangiopathy. 3. Intracranial atherosclerosis. 4. Lesser incidental findings as above.   A preliminary report was issued by the [facility] teleradiology service. There are no major discrepancies.  elm-remote  Dictated by (CST): Horacio Downs MD on 12/23/2024 at 9:47 AM     Finalized by (CST): Horacio Downs MD on 12/23/2024 at 9:52 AM                 Patrice Madrid MD  12/24/2024

## 2024-12-24 NOTE — PROGRESS NOTES
[facility]                                                            PROGRESS NOTE      Patient Name: Jeff Lamb MRN: [id number]   : 3/14/1931 CSN: [id number]       Assessment and Plan:     Persistent AF  -Rate controlled on no meds  -Asymptomatic  -Moderately keven risk of stroke with elevated [id number] score, however will continue to hold anticoagulation due to multiple active and ongoing bleeding risks, as below.  Per Neurosurgery Svc, re-consider anticoagulation after 2 weeks and repeat head imaging.    Bladder cancer  Gross hematuria  Subdural hematoma     Akira Sears MD  [facility]  2024  L3    History:   The patient has no new symptoms.    Medications:      donepezil  5 mg Oral Nightly    levothyroxine  25 mcg Oral Before breakfast    mirtazapine  15 mg Oral Nightly    tamsulosin  0.4 mg Oral Daily    midodrine  5 mg Oral Once    OLANZapine  5 mg Oral Nightly      sodium chloride 75 mL/hr at 24 0224    sodium chloride         Objective:   Blood pressure 117/66, pulse 81, temperature 98.5 °F (36.9 °C), temperature source Oral, resp. rate 18, weight 165 lb 2 oz (74.9 kg), SpO2 100%.  GEN - no distress  HEENT - normal conjunctiva, moist mucosa  Neck - supple, no LAD  Lungs - nonlabored, clear bilaterally  Heart - JVP 14 cm H2O, carotids normal; irreg irreg, nl S1/S2, no murmur, gallop, or rub; no edema  Abd - soft, nontender  Ext - arthritic changes  Neuro - A+Ox3, no facial droop or gross deficits  Psych - cooperative, calm    Results:     Lab Results   Component Value Date    WBC 6.6 2024    HGB 6.9 (LL) 2024    HCT 20.2 (L) 2024    .0 2024    CREATSERUM 0.95 2024    BUN 26 (H) 2024     2024    K 3.9 2024     2024    CO2 25.0 2024    GLU 94 2024    CA 8.6 (L) 2024    ALB 3.5 2024     ALKPHO 47 12/21/2024    BILT 2.1 (H) 12/21/2024    TP 7.2 12/21/2024    AST 33 12/21/2024    ALT 20 12/21/2024    PTT 30.2 11/24/2015    INR 1.1 11/24/2015    PT 13.7 11/24/2015    TSH 2.673 05/03/2024    CRP <0.5 11/24/2015    MG 2.0 12/24/2024    PHOS 2.6 12/24/2024    B12 692 09/11/2019       Imaging: I independently visualized all relevant chest imaging in PACS, agree with radiology interpretation except where noted.    CT BRAIN OR HEAD (CPT=70450)    Result Date: 12/23/2024  CONCLUSION:  1. Stable exam.  Tiny 3-4 mm thickness curvilinear hyperdensity at the left paramedian vertex is unchanged since previous day head CT.  This could relate to an incidental vascular structure or trace acute subdural and/or subarachnoid hemorrhage.  Comparison with any available more remote exams would be helpful to assess stability.  No new or worsening intracranial hemorrhage.  No mass effect or midline shift. 2. Nonspecific white matter changes involving both cerebral hemispheres that most likely reflect sequelae of chronic microangiopathy. 3. Intracranial atherosclerosis. 4. Lesser incidental findings as above.   A preliminary report was issued by the [facility] teleradiology service. There are no major discrepancies.  elm-remote  Dictated by (CST): Horacio Downs MD on 12/23/2024 at 9:47 AM     Finalized by (CST): Horacio Downs MD on 12/23/2024 at 9:52 AM          CT BRAIN OR HEAD (CPT=70450)    Result Date: 12/22/2024  CONCLUSION:  1. A small 3 mm thick left parietal subdural hematoma or less likely a thickened dural membrane related to a remote hematoma.  No hydrocephalus or midline shift.  Suggest follow-up CT in 12 hours.  Findings were called to doctor's to [facility]. 2. Moderate to advanced changes of chronic small vessel disease in both cerebral hemispheres.  New line large vessel intracranial atherosclerosis. 3. Large vessel intracranial atherosclerosis.     Dictated by (CST): Ramiro Jama MD on 12/22/2024  at 12:03 PM     Finalized by (CST): Ramiro Jama MD on 12/22/2024 at 12:12 PM           ------------------------------------------------------------------------------------------------------------------------------

## 2024-12-24 NOTE — PHYSICAL THERAPY NOTE
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      Patient is requiring moderate assist as a result of the following impairments: decreased functional strength, decreased endurance/aerobic capacity, impaired standing and sitting balance, impaired motor planning, decreased muscular endurance, medical status, and very [place] .     Patient continues to function below baseline with bed mobility, transfers, gait, stair negotiation, maintaining seated position, standing prolonged periods, and performing household tasks.  Next session anticipate patient to progress transfers, gait, and standing prolonged periods.  Physical Therapy will continue to follow patient for duration of hospitalization.    Patient continues to benefit from continued skilled PT services: to promote return to prior level of function and safety with continuous assistance and gradual rehabilitative therapy . However, family reports they do not want rehab and are planning to bring patient home with 24 hr care, would need HHPT then as well.    PLAN DURING HOSPITALIZATION  Nursing Mobility Recommendation : 1 Assist (Two person for safety)  PT Device Recommendation: Rolling walker  PT Treatment Plan: Body mechanics;Bed mobility;Endurance;Patient education;Energy conservation;Family education;Gait training;Range of motion;Strengthening;Stoop training;Stair training;Transfer training;Balance training  Frequency (Obs):  3-5x/week     SUBJECTIVE  \"Your job requires a LOT of patience!\"    OBJECTIVE  Precautions: Bed/chair alarm;HOB elevated 30 degrees    PAIN ASSESSMENT   Ratin          BALANCE  Static Sitting: Fair  Dynamic Sitting: Fair -  Static Standing: Poor +  Dynamic Standing: Poor    ACTIVITY TOLERANCE  Pulse: 87  Heart Rate Source: Monitor     BP: 121/82  BP Location: Right arm  BP Method: Automatic  Patient Position: Sitting     O2 WALK  Oxygen Therapy  SPO2% on Room Air at Rest: 10  SPO2% Ambulation on Room Air: 98    AM-PAC '6-Clicks' INPATIENT SHORT FORM - BASIC MOBILITY  How much difficulty does the patient currently have...  Patient Difficulty: Turning over in bed (including adjusting bedclothes, sheets and blankets)?: A Lot   Patient Difficulty: Sitting down on and standing up from a chair with arms (e.g., wheelchair, bedside commode, etc.): A Lot   Patient Difficulty: Moving from lying on back to sitting on the side of the bed?: A Lot   How much help from another person does the patient currently need...   Help from Another: Moving to and from a bed to a chair (including a wheelchair)?: A Lot   Help from Another: Need to walk in hospital room?: A Lot   Help from Another: Climbing 3-5 steps with a railing?: Total     AM-PAC Score:  Raw Score: 11   Approx Degree of Impairment: 72.57%   Standardized Score (AM-PAC Scale): 33.86   CMS Modifier (G-Code): CL    FUNCTIONAL ABILITY STATUS  Functional Mobility/Gait Assessment  Gait Assistance: Moderate assistance;Minimum assistance  Distance (ft): 10', 15'  Assistive Device: Rolling walker  Pattern: Shuffle;R Flexed knee;L Flexed knee (flexed posture, overall flexed)  Rolling: minimal assist  Supine to Sit: moderate assist  Sit to Supine: moderate assist  Sit to Stand: moderate assist    Skilled Therapy Provided: RN approves participation in therapy session. Patient's wife in room feeding pt and son in room observing, other daughter also arrives during session. Pt is alert  and motivated, VERY [place] and benefits from speaking directly into his ear. He is able to stand and move with assist, no dizziness but noted BLE flexion at hips and knees that increases with increased distance or standing. He has very long legs and so even with raised seat height he needs assist. He has unsteadiness on turns and needs hands on assist. Some family education performed today and pt is feeling better after blood transfusion> He will continue to benefit from therapy. He is up in chair with all needs in reach and family in room> Rn and MD hospitalist in room as well    The patient's Approx Degree of Impairment: 72.57% has been calculated based on documentation in the [facility] '6 clicks' Inpatient Daily Activity Short Form.  Research supports that patients with this level of impairment may benefit from gradual rehab and this is the recommendation.  Final disposition will be made by interdisciplinary medical team.    THERAPEUTIC EXERCISES  Lower Extremity Alternating marching  Glut sets  LAQ  Quad sets     Position Sitting & Standing       Patient End of Session: Up in chair;Needs met;Call light within reach;With  staff;RN aware of session/findings;All patient questions and concerns addressed;Hospital anti-slip socks;Family present    CURRENT GOALS     Goals to be met by: 1/6/25  Patient Goal Patient's self-stated goal is: return to PLOF   Goal #1 Patient is able to demonstrate supine - sit EOB @ level: supervision      Goal #1   Current Status  mod A, needs help to roll and to scoot hips to EOB, slow, increased time   Goal #2 Patient is able to demonstrate transfers Sit to/from Stand at assistance level: CG with walker - rolling      Goal #2  Current Status  min A from raised bed height but mod A from chair level   Goal #3 Patient is able to ambulate 100 feet with assist device: walker - rolling at assistance level: CGA   Goal #3   Current Status  in progress, min-mod A with RW10' then 15' with seated rest.  He needs mod A on turns with mild LOB   Goal #4 Patient will negotiate 7 stairs/one curb w/ assistive device and CGA   Goal #4   Current Status  NT   Goal #5 Patient to demonstrate independence with home activity/exercise instructions provided to patient in preparation for discharge.   Goal #5   Current Status  In progress        Therapeutic Activity: 26 minutes

## 2024-12-24 NOTE — CM/SW NOTE
ANU followed up on DC planning.     ANU spoke with pts son and spouse at bedside to discuss HHC per their request.     HHC referrals were sent - [facility] accepted and family is agreeable to use them for services.     Son stating they need assistance with day to day assistance    ANU provided list of caregivers for assistance at bedside to both to review    ANU emphasized that although DC date is unknown at this time - family should call to arrange care asap    Son voices understanding    [facility]   [phone]    PLAN: home with [facility] and caregivers to be arranged by family gracie REY LSW, MSW ext. [phone]

## 2024-12-24 NOTE — PAYOR COMM NOTE
--------------  12/24 CONTINUED STAY REVIEW    Payor: HEATHER ARMENTA O  Subscriber #:  [id number]  Authorization Number: [id number]    UROLOGY:     resting        Objective:  /63 (BP Location: Right arm)   Pulse 81   Temp 98.9 °F (37.2 °C) (Oral)   Resp 16   Wt 165 lb 2 oz (74.9 kg)   SpO2 100%   BMI 22.39 kg/m²             Lab Results   Component Value Date     WBC 6.6 12/24/2024     HGB 6.9 12/24/2024     HCT 20.2 12/24/2024     .0 12/24/2024     CREATSERUM 0.95 12/24/2024     BUN 26 12/24/2024      12/24/2024     K 3.9 12/24/2024      12/24/2024     CO2 25.0 12/24/2024     GLU 94 12/24/2024     CA 8.6 12/24/2024         Assessment:  Bladder Ca  Hematuria     Plan:  -will have voiding trial  -answered family's questions re: Eliquis and recurrent bladder bleeding    CARDS:    Assessment and Plan:     Persistent AF  -Rate controlled on no meds  -Asymptomatic  -Moderately keven risk of stroke with elevated [id number] score, however will continue to hold anticoagulation due to multiple active and ongoing bleeding risks, as below.  Per Neurosurgery Svc, re-consider anticoagulation after 2 weeks and repeat head imaging.     Bladder cancer  Gross hematuria  Subdural hematoma    Scheduled Medications    donepezil  5 mg Oral Nightly    levothyroxine  25 mcg Oral Before breakfast    mirtazapine  15 mg Oral Nightly    tamsulosin  0.4 mg Oral Daily    midodrine  5 mg Oral Once    OLANZapine  5 mg Oral Nightly         Medication Infusions[]Expand by Default    sodium chloride 75 mL/hr at 12/23/24 0224    sodium chloride                  Lab Results   Component Value Date     WBC 6.6 12/24/2024     HGB 6.9 (LL) 12/24/2024     HCT 20.2 (L) 12/24/2024     .0 12/24/2024     CREATSERUM 0.95 12/24/2024     BUN 26 (H) 12/24/2024      12/24/2024     K 3.9 12/24/2024      12/24/2024     CO2 25.0 12/24/2024     GLU 94 12/24/2024     CA 8.6 (L) 12/24/2024     ALB 3.5 12/24/2024        MEDICATIONS ADMINISTERED IN LAST 1 DAY:  Transfuse RBC       Date Action Dose Route User    12/24/2024 0550 New Bag (none) Intravenous Sonia Ferguson RN          donepezil (Aricept) tab 5 mg       Date Action Dose Route User    12/23/2024 2006 Given 5 mg Oral Sonia Ferguson RN       sodium chloride 0.9% infusion       Date Action Dose Route User    12/24/2024 0902 New Bag (none) Intravenous Cuong Hooks RN          sodium chloride 0.9% infusion       Date Action Dose Route User    12/24/2024 0550 New Bag (none) Intravenous Sonia Ferguson RN          tamsulosin (Flomax) cap 0.4 mg       Date Action Dose Route User    12/24/2024 0754 Given 0.4 mg Oral Cuong Hooks RN            Vitals (last day)       Date/Time Temp Pulse Resp BP SpO2 Weight O2 Device O2 Flow Rate (L/min) [facility]    12/24/24 0854 98.2 °F (36.8 °C) 77 16 100/68 100 % -- None (Room air) -- MB    12/24/24 0751 98.5 °F (36.9 °C) 81 18 117/66 100 % -- None (Room air) -- MB    12/24/24 0651 -- 81 -- -- -- -- -- -- [place]    12/24/24 0651 98.9 °F (37.2 °C) -- 16 102/63 100 % -- None (Room air) -- IM    12/24/24 0550 98 °F (36.7 °C) 77 16 104/62 100 % -- -- -- [place]    12/24/24 0424 -- 75 -- -- -- -- -- -- [place]    12/24/24 0424 98.5 °F (36.9 °C) -- 16 110/72 100 % -- None (Room air) -- IM    12/23/24 1951 98 °F (36.7 °C) 77 -- -- -- -- -- -- [place]       Blood Transfusion Record       Product Unit Status Volume Start End            Transfuse RBC       24  [id number]  [id number] Completed 12/24/24 0923 700 mL 12/24/24 0550 12/24/24 0900       24  [id number]  [id number] Completed 12/22/24 1042 341.67 mL 12/22/24 0633 12/22/24 0958

## 2024-12-24 NOTE — PROGRESS NOTES
[facility]  part of [facility]     Progress Note    Jeff Lamb Patient Status:  Inpatient    3/14/1931 MRN [id number]   Location [facility] 4W/SW/SE Attending Patrice Madrid MD   Hosp Day # 2 PCP DEBRA DENNY MD     Subjective:  Jeff Lamb is a(n) 89 year old male.    Current  complaints: resting    Medications:  Current Facility-Administered Medications   Medication Dose Route Frequency    donepezil (Aricept) tab 5 mg  5 mg Oral Nightly    levothyroxine (Synthroid) tab 25 mcg  25 mcg Oral Before breakfast    mirtazapine (Remeron) tab 15 mg  15 mg Oral Nightly    sodium chloride 0.9% infusion   Intravenous Continuous    acetaminophen (Tylenol Extra Strength) tab 500 mg  500 mg Oral Q4H PRN    tamsulosin (Flomax) cap 0.4 mg  0.4 mg Oral Daily    midodrine (ProAmatine) tab 5 mg  5 mg Oral Once    LORazepam (Ativan) 2 mg/mL injection 0.5 mg  0.5 mg Intravenous Q6H PRN    OLANZapine (ZyPREXA Zydis) disintegrating tab 5 mg  5 mg Oral Nightly    haloperidol lactate (Haldol) 5 MG/ML injection 2 mg  2 mg Intravenous Q6H PRN    sodium chloride 0.9 % irrigation 3,000 mL  3,000 mL Irrigation Continuous     Objective:  /63 (BP Location: Right arm)   Pulse 81   Temp 98.9 °F (37.2 °C) (Oral)   Resp 16   Wt 165 lb 2 oz (74.9 kg)   SpO2 100%   BMI 22.39 kg/m²     Intake/Output Summary (Last 24 hours) at 2024 0739  Last data filed at 2024 0651  Gross per 24 hour   Intake 1043 ml   Output 2200 ml   Net -1157 ml       General Appearance: Alert, cooperative, no distress, appears stated age  Head: Normocephalic, without obvious abnormality, atraumatic              Lab Results   Component Value Date    WBC 6.6 2024    HGB 6.9 2024    HCT 20.2 2024    .0 2024    CREATSERUM 0.95 2024    BUN 26 2024     2024    K 3.9 2024     2024    CO2 25.0 2024    GLU 94 2024    CA 8.6 2024           Assessment:  Bladder Ca  Hematuria    Plan:  -will have voiding trial  -answered family's questions re: Eliquis and recurrent bladder bleeding    Daniel Rodriguez MD  12/24/2024  7:39 AM

## 2024-12-24 NOTE — OCCUPATIONAL THERAPY NOTE
OCCUPATIONAL THERAPY EVALUATION - INPATIENT     Room Number: 405/405-A  Evaluation Date: 12/24/2024  Type of Evaluation: Initial  Presenting Problem: generalized weakness and blood in urine, SDH    Physician Order: IP Consult to Occupational Therapy  Reason for Therapy: ADL/IADL Dysfunction and Discharge Planning    OCCUPATIONAL THERAPY ASSESSMENT   Patient is a 89 year old male admitted 12/21/2024.  Prior to admission, patient's baseline is mod I for ADLs, TFRS, functional mobility no AD.  Patient is currently functioning below baseline with ADLs and functional mobility/TFRs.  Patient is requiring mod/max A for LB ADLS, min/mod A for TFRS, functional mobility as a result of the following impairments: decreased functional strength, decreased endurance, impaired  balance, decreased muscular endurance, cognitive deficits , and decreased safety awareness. Occupational Therapy will continue to follow for duration of hospitalization.    Patient will benefit from continued skilled OT Services to promote return to prior level of function and safety with continuous assistance and gradual rehabilitative therapy.    PLAN DURING HOSPITALIZATION  OT Device Recommendations: Transfer tub bench  OT Treatment Plan: Balance activities;Energy conservation/work simplification techniques;ADL training;Functional transfer training;UE strengthening/ROM;Patient/Family education;Endurance training     OCCUPATIONAL THERAPY MEDICAL/SOCIAL HISTORY   Problem List  Principal Problem:    Gross hematuria  Active Problems:    Anemia, unspecified type    VERONICA (acute kidney injury) (HCC)    Delirium due to another medical condition    Subdural hematoma (HCC)    HOME SITUATION  Type of Home: House  Home Layout: One level  Lives With: Spouse  Toilet and Equipment: Standard height toilet  Shower/Tub and Equipment: Walk-in shower; Other (Comment) ((in basement, tub on main level))  Drives: No  Patient Regularly Uses: None; Other (Comment) (owns  RW)      SUBJECTIVE  \"You must have a lot of patience working herE\"    OCCUPATIONAL THERAPY EXAMINATION      OBJECTIVE  Precautions: Bed/chair alarm; HOB elevated 30 degrees  Fall Risk: High fall risk      PAIN ASSESSMENT  Ratin      ACTIVITY TOLERANCE  Pulse: 77  Heart Rate Source: Monitor     BP: 112/61  BP Location: Right arm  BP Method: Automatic  Patient Position: Sitting    O2 SATURATIONS  Oxygen Therapy  SPO2% on Room Air at Rest: 100    COGNITION  Orientation Level:  oriented to place, oriented to situation, and oriented to person      RANGE OF MOTION   Upper extremity ROM is within functional limits     STRENGTH ASSESSMENT  Upper extremity strength is within functional limits     COORDINATION  Gross Motor: WFL   Fine Motor: WFL     ACTIVITIES OF DAILY LIVING ASSESSMENT  AM-PAC ‘6-Clicks’ Inpatient Daily Activity Short Form  How much help from another person does the patient currently need…  -   Putting on and taking off regular lower body clothing?: A Lot  -   Bathing (including washing, rinsing, drying)?: A Lot  -   Toileting, which includes using toilet, bedpan or urinal? : A Lot  -   Putting on and taking off regular upper body clothing?: A Little  -   Taking care of personal grooming such as brushing teeth?: A Little  -   Eating meals?: A Little    AM-PAC Score:  Score: 15  Approx Degree of Impairment: 56.46%  Standardized Score (AM-PAC Scale): 34.69  CMS Modifier (G-Code): CK    BED MOBILITY  Supine to Sit: mod assist, Head of bed elevated, use of rails.     FUNCTIONAL TRANSFER ASSESSMENT  Sit to Stand from EOB: min assist, elvatd surface  Sit to Stand from Chair: min assist  Toilet Transfer: mod assist  Comments:    Use of 2ww, cues for posture and hand placement    FUNCTIONAL MOBILITY  min assist for in-room fx mobility using RW  Comments:     Min A stability 12 ft + 15 ft. Cues for posture and positioning, mod A for turns-somewhat impulsive    ACTIVITIES OF DAILY LIVING  Eating: min assist-no  notable deficits, wife observed feeding patient at bedside  Grooming: setup assist-seated  UB Dressing: setup assist-seated  LB Dressing: max assist  Toileting: max assist  Comments:     Graded based on abilities during session and clinical judgement. Pt demos abilities with:  Toileting, LB dressing, gown adjustment.          Skilled Therapy Provided: Pt seen for OT session. See ADL and mobility sections for details.       EDUCATION PROVIDED  Patient Education : Role of Occupational Therapy; Plan of Care; Discharge Recommendations; DME Recommendations; Functional Transfer Techniques; Fall Prevention; Posture/Positioning; Proper Body Mechanics  Patient's Response to Education: Verbalized Understanding; Returned Demonstration    The patient's Approx Degree of Impairment: 56.46% has been calculated based on documentation in the [facility] '6 clicks' Inpatient Daily Activity Short Form.  Research supports that patients with this level of impairment may benefit from rehab.  Final disposition will be made by interdisciplinary medical team.     Patient End of Session: Up in chair;Needs met;Call light within reach;RN aware of session/findings;All patient questions and concerns addressed;Hospital anti-slip socks;Alarm set;Family present    OT Goals  Patients self stated goal is: go home     Patient will complete functional transfer with supervision, 2ww  Comment:     Patient will complete toileting with supervision  Comment:     Patient will tolerate standing for 5 minutes in prep for adls with supervision   Comment:    Patient will complete LB dressing with supervision  Comment:          Goals  on: 24  Frequency: 3xs/week    Patient Evaluation Complexity Level:   Occupational Profile/Medical History LOW - Brief history including review of medical or therapy records    Specific performance deficits impacting engagement in ADL/IADL MODERATE  3 - 5 performance deficits   Client Assessment/Performance Deficits LOW - No  comorbidities nor modifications of tasks    Clinical Decision Making LOW - Analysis of occupational profile, problem-focused assessments, limited treatment options    Overall Complexity LOW     OT Session Time: 15 minutes  Self-Care Home Management: 15 minutes

## 2024-12-24 NOTE — PLAN OF CARE
Patient up with two assist and walker. Tolerating diet. One unit PRBCs transfused. IV fluids infusing. Loose bowel movement. Unable to void after umaña removal this morning, new umaña placed per Urology order. Plan to discharge home with home health when cleared.    Problem: Patient Centered Care  Goal: Patient preferences are identified and integrated in the patient's plan of care  Description: Interventions:  - What would you like us to know as we care for you?   - Provide timely, complete, and accurate information to patient/family  - Incorporate patient and family knowledge, values, beliefs, and cultural backgrounds into the planning and delivery of care  - Encourage patient/family to participate in care and decision-making at the level they choose  - Honor patient and family perspectives and choices  Outcome: Progressing      Problem: PAIN - ADULT  Goal: Verbalizes/displays adequate comfort level or patient's stated pain goal  Description: INTERVENTIONS:  - Encourage pt to monitor pain and request assistance  - Assess pain using appropriate pain scale  - Administer analgesics based on type and severity of pain and evaluate response  - Implement non-pharmacological measures as appropriate and evaluate response  - Consider cultural and social influences on pain and pain management  - Manage/alleviate anxiety  - Utilize distraction and/or relaxation techniques  - Monitor for opioid side effects  - Notify MD/LIP if interventions unsuccessful or patient reports new pain  - Anticipate increased pain with activity and pre-medicate as appropriate  Outcome: Progressing     Problem: RISK FOR INFECTION - ADULT  Goal: Absence of fever/infection during anticipated neutropenic period  Description: INTERVENTIONS  - Monitor WBC  - Administer growth factors as ordered  - Implement neutropenic guidelines  Outcome: Progressing     Problem: SAFETY ADULT - FALL  Goal: Free from fall injury  Description: INTERVENTIONS:  - Assess pt  frequently for physical needs  - Identify cognitive and physical deficits and behaviors that affect risk of falls.  - [place] fall precautions as indicated by assessment.  - Educate pt/family on patient safety including physical limitations  - Instruct pt to call for assistance with activity based on assessment  - Modify environment to reduce risk of injury  - Provide assistive devices as appropriate  - Consider OT/PT consult to assist with strengthening/mobility  - Encourage toileting schedule  Outcome: Progressing     Problem: DISCHARGE PLANNING  Goal: Discharge to home or other facility with appropriate resources  Description: INTERVENTIONS:  - Identify barriers to discharge w/pt and caregiver  - Include patient/family/discharge partner in discharge planning  - Arrange for needed discharge resources and transportation as appropriate  - Identify discharge learning needs (meds, wound care, etc)  - Arrange for interpreters to assist at discharge as needed  - Consider post-discharge preferences of patient/family/discharge partner  - Complete POLST form as appropriate  - Assess patient's ability to be responsible for managing their own health  - Refer to Case Management Department for coordinating discharge planning if the patient needs post-hospital services based on physician/LIP order or complex needs related to functional status, cognitive ability or social support system  Outcome: Progressing     Problem: GENITOURINARY - ADULT  Goal: Absence of urinary retention  Description: INTERVENTIONS:  - Assess patient’s ability to void and empty bladder  - Monitor intake/output and perform bladder scan as needed  - Follow urinary retention protocol/standard of care  - Consider collaborating with pharmacy to review patient's medication profile  - Implement strategies to promote bladder emptying  Outcome: Progressing

## 2024-12-24 NOTE — PLAN OF CARE
Patient has safety precautions in place bed in the lowest position, bed alarm on, and call light within reach. Plan of care ongoing. No further concerns as of present.    Problem: Patient Centered Care  Goal: Patient preferences are identified and integrated in the patient's plan of care  Description: Interventions:    - Provide timely, complete, and accurate information to patient/family  - Incorporate patient and family knowledge, values, beliefs, and cultural backgrounds into the planning and delivery of care  - Encourage patient/family to participate in care and decision-making at the level they choose  - Honor patient and family perspectives and choices  Outcome: Progressing    Problem: PAIN - ADULT  Goal: Verbalizes/displays adequate comfort level or patient's stated pain goal  Description: INTERVENTIONS:  - Encourage pt to monitor pain and request assistance  - Assess pain using appropriate pain scale  - Administer analgesics based on type and severity of pain and evaluate response  - Implement non-pharmacological measures as appropriate and evaluate response  - Consider cultural and social influences on pain and pain management  - Manage/alleviate anxiety  - Utilize distraction and/or relaxation techniques  - Monitor for opioid side effects  - Notify MD/LIP if interventions unsuccessful or patient reports new pain  - Anticipate increased pain with activity and pre-medicate as appropriate  Outcome: Progressing     Problem: RISK FOR INFECTION - ADULT  Goal: Absence of fever/infection during anticipated neutropenic period  Description: INTERVENTIONS  - Monitor WBC  - Administer growth factors as ordered  - Implement neutropenic guidelines  Outcome: Progressing     Problem: SAFETY ADULT - FALL  Goal: Free from fall injury  Description: INTERVENTIONS:  - Assess pt frequently for physical needs  - Identify cognitive and physical deficits and behaviors that affect risk of falls.  - [place] fall precautions as  indicated by assessment.  - Educate pt/family on patient safety including physical limitations  - Instruct pt to call for assistance with activity based on assessment  - Modify environment to reduce risk of injury  - Provide assistive devices as appropriate  - Consider OT/PT consult to assist with strengthening/mobility  - Encourage toileting schedule  Outcome: Progressing     Problem: DISCHARGE PLANNING  Goal: Discharge to home or other facility with appropriate resources  Description: INTERVENTIONS:  - Identify barriers to discharge w/pt and caregiver  - Include patient/family/discharge partner in discharge planning  - Arrange for needed discharge resources and transportation as appropriate  - Identify discharge learning needs (meds, wound care, etc)  - Arrange for interpreters to assist at discharge as needed  - Consider post-discharge preferences of patient/family/discharge partner  - Complete POLST form as appropriate  - Assess patient's ability to be responsible for managing their own health  - Refer to Case Management Department for coordinating discharge planning if the patient needs post-hospital services based on physician/LIP order or complex needs related to functional status, cognitive ability or social support system  Outcome: Progressing     Problem: GENITOURINARY - ADULT  Goal: Absence of urinary retention  Description: INTERVENTIONS:  - Assess patient’s ability to void and empty bladder  - Monitor intake/output and perform bladder scan as needed  - Follow urinary retention protocol/standard of care  - Consider collaborating with pharmacy to review patient's medication profile  - Implement strategies to promote bladder emptying  Outcome: Progressing

## 2024-12-25 LAB
ALBUMIN SERPL-MCNC: 3.4 G/DL (ref 3.2–4.8)
ANION GAP SERPL CALC-SCNC: 5 MMOL/L (ref 0–18)
BASOPHILS # BLD AUTO: 0.05 X10(3) UL (ref 0–0.2)
BASOPHILS NFR BLD AUTO: 0.8 %
BLOOD TYPE BARCODE: 600
BUN BLD-MCNC: 21 MG/DL (ref 9–23)
BUN/CREAT SERPL: 24.7 (ref 10–20)
CALCIUM BLD-MCNC: 8.4 MG/DL (ref 8.7–10.4)
CHLORIDE SERPL-SCNC: 109 MMOL/L (ref 98–112)
CO2 SERPL-SCNC: 25 MMOL/L (ref 21–32)
CREAT BLD-MCNC: 0.85 MG/DL
DEPRECATED RDW RBC AUTO: 81 FL (ref 35.1–46.3)
EGFRCR SERPLBLD CKD-EPI 2021: 81 ML/MIN/1.73M2 (ref 60–?)
EOSINOPHIL # BLD AUTO: 0.15 X10(3) UL (ref 0–0.7)
EOSINOPHIL NFR BLD AUTO: 2.3 %
ERYTHROCYTE [DISTWIDTH] IN BLOOD BY AUTOMATED COUNT: 23.4 % (ref 11–15)
GLUCOSE BLD-MCNC: 98 MG/DL (ref 70–99)
HCT VFR BLD AUTO: 22.1 %
HGB BLD-MCNC: 7.4 G/DL
IMM GRANULOCYTES # BLD AUTO: 0.27 X10(3) UL (ref 0–1)
IMM GRANULOCYTES NFR BLD: 4.2 %
LYMPHOCYTES # BLD AUTO: 0.78 X10(3) UL (ref 1–4)
LYMPHOCYTES NFR BLD AUTO: 12 %
MAGNESIUM SERPL-MCNC: 1.8 MG/DL (ref 1.6–2.6)
MCH RBC QN AUTO: 32 PG (ref 26–34)
MCHC RBC AUTO-ENTMCNC: 33.5 G/DL (ref 31–37)
MCV RBC AUTO: 95.7 FL
MONOCYTES # BLD AUTO: 1.03 X10(3) UL (ref 0.1–1)
MONOCYTES NFR BLD AUTO: 15.9 %
NEUTROPHILS # BLD AUTO: 4.21 X10 (3) UL (ref 1.5–7.7)
NEUTROPHILS # BLD AUTO: 4.21 X10(3) UL (ref 1.5–7.7)
NEUTROPHILS NFR BLD AUTO: 64.8 %
OSMOLALITY SERPL CALC.SUM OF ELEC: 291 MOSM/KG (ref 275–295)
PHOSPHATE SERPL-MCNC: 2.7 MG/DL (ref 2.4–5.1)
PLATELET # BLD AUTO: 302 10(3)UL (ref 150–450)
POTASSIUM SERPL-SCNC: 3.8 MMOL/L (ref 3.5–5.1)
RBC # BLD AUTO: 2.31 X10(6)UL
SODIUM SERPL-SCNC: 139 MMOL/L (ref 136–145)
UNIT VOLUME: 350 ML
WBC # BLD AUTO: 6.5 X10(3) UL (ref 4–11)

## 2024-12-25 PROCEDURE — 99233 SBSQ HOSP IP/OBS HIGH 50: CPT | Performed by: HOSPITALIST

## 2024-12-25 RX ORDER — MAGNESIUM OXIDE 400 MG/1
400 TABLET ORAL ONCE
Status: COMPLETED | OUTPATIENT
Start: 2024-12-25 | End: 2024-12-25

## 2024-12-25 RX ORDER — CALCIUM CARBONATE 500 MG/1
1000 TABLET, CHEWABLE ORAL 3 TIMES DAILY PRN
Status: DISCONTINUED | OUTPATIENT
Start: 2024-12-25 | End: 2024-12-27

## 2024-12-25 NOTE — PROGRESS NOTES
Progress Note  Jeff [facility] Patient Status:  Inpatient    3/14/1931 MRN [id number]   Location Health system 4W/SW/SE Attending Patrice Madrid MD   Hosp Day # 3 PCP DEBRA DENNY MD     Subjective:  Pt denies any  cardiac concerns     Objective:  /69 (BP Location: Right arm)   Pulse 89   Temp 97.5 °F (36.4 °C) (Oral)   Resp 20   Wt 165 lb 2 oz (74.9 kg)   SpO2 100%   BMI 22.39 kg/m²     Telemetry: afib      Intake/Output:    Intake/Output Summary (Last 24 hours) at 2024 0645  Last data filed at 2024 0600  Gross per 24 hour   Intake 1250 ml   Output 2975 ml   Net -1725 ml       Last 3 Weights   24 0315 165 lb 2 oz (74.9 kg)   24 0227 165 lb 2 oz (74.9 kg)   23 1225 168 lb (76.2 kg)   22 1500 174 lb (78.9 kg)       Labs:  Recent Labs   Lab 24  1221 24  0356 24  0424   * 94 98   BUN 28* 26* 21   CREATSERUM 1.29 0.95 0.85   EGFRCR 52* 75 81   CA 8.9 8.6* 8.4*    138 139   K 4.1 3.9 3.8    108 109   CO2 23.0 25.0 25.0     Recent Labs   Lab 24  1221 24  0356 24  0955 24  0424   RBC 2.14* 2.08* 2.41* 2.31*   HGB 7.3* 6.9* 7.9* 7.4*   HCT 21.6* 20.2* 23.2* 22.1*   .9* 97.1 96.3 95.7   MCH 34.1* 33.2 32.8 32.0   MCHC 33.8 34.2 34.1 33.5   RDW 23.6* 23.0* 23.4* 23.4*   NEPRELIM 6.53 4.29  --  4.21   WBC 8.8 6.6 6.8 6.5   .0 293.0 268.0 302.0         No results for input(s): \"TROP\", \"TROPHS\", \"CK\" in the last 168 hours.  Lab Results   Component Value Date    PT 13.7 2015    INR 1.1 2015       Diagnostics:     Review of Systems   Respiratory: Negative.     Cardiovascular: Negative.          Physical Exam:    Physical Exam  Vitals reviewed.   Constitutional:       General: He is not in acute distress.     Appearance: He is not ill-appearing.   Neck:      Vascular: No JVD.   Cardiovascular:      Rate and Rhythm: Normal rate. Rhythm irregular.      Pulses: Normal pulses.      Heart  sounds: Normal heart sounds. No murmur heard.     No friction rub. No gallop.   Pulmonary:      Effort: Pulmonary effort is normal.      Breath sounds: Normal breath sounds.   Abdominal:      General: Abdomen is flat. Bowel sounds are normal.      Palpations: Abdomen is soft.   Musculoskeletal:      Cervical back: Normal range of motion.   Skin:     General: Skin is warm and dry.   Neurological:      Mental Status: He is alert and oriented to person, place, and time.         Medications:   magnesium oxide  400 mg Oral Once    donepezil  5 mg Oral Nightly    levothyroxine  25 mcg Oral Before breakfast    mirtazapine  15 mg Oral Nightly    tamsulosin  0.4 mg Oral Daily    midodrine  5 mg Oral Once    OLANZapine  5 mg Oral Nightly      sodium chloride 75 mL/hr at 12/24/24 2200    sodium chloride         Assessment/Plan:    Persistent atrial fibrillation  - rates controlled without any meds  - [id number] 2   Moderately high risk for stroke, AC on hold due to multiple active and on going bleeding risk ( bladder cancer, gross hematuria, subdural hematoma). Plan to repeat CT of head in 2 weeks per neurology, may need to reconsider AC post CT.     Bladder cancer  Gross hematuria  - s/p Cystoscopy, clot evacuation, fulguration of bleeding and CBI   - urology following    Subdural hematoma  - plan to repeat CT head in 2 week as OP per neuro      Plan;  - continue to hold AC at this time  - f/u as OP after the repeat CT to reconsider AC     Plan discussed with pt and daughter at bedside  NINFA Perez  [facility]  12/25/2024  6:45 AM

## 2024-12-25 NOTE — CM/SW NOTE
12/25/24 1400   Discharge Needs   Anticipated D/C needs Subacute rehab   Services Requested   Submitted to [facility] Yes   PASRR Level 1 Submitted Yes   PMR Consult Requested Not clinically appropriate at this time   Choice of Post-Acute Provider   Informed patient of right to choose their preferred provider Yes     SW was informed by CANDIS Cope that pt/family are now agreeable to [facility].    [facility] submitted.    [facility] referrals sent in Aidin.  PASRR completed and uploaded to referral.    Pt's daughter Elysia asked that [facility] list w/Medicare quality data be emailed to [email or web address] when populated.    Pt requires insurance auth for [facility] benefit.      DSC to check 12/26 if plan is through [employer] portal.    PLAN: DC to [facility] pending list/choice/auth    / to remain available for support and/or discharge planning.     Braina Sloan MSW, LSW [id number]

## 2024-12-25 NOTE — PLAN OF CARE
Problem: Patient Centered Care  Goal: Patient preferences are identified and integrated in the patient's plan of care  Description: Interventions:  - What would you like us to know as we care for you?   - Provide timely, complete, and accurate information to patient/family  - Incorporate patient and family knowledge, values, beliefs, and cultural backgrounds into the planning and delivery of care  - Encourage patient/family to participate in care and decision-making at the level they choose  - Honor patient and family perspectives and choices  Outcome: Progressing     Problem: Patient/Family Goals  Goal: Patient/Family Long Term Goal  Description: Patient's Long Term Goal:     Interventions:  - Follow up with MD  - See additional Care Plan goals for specific interventions  Outcome: Progressing  Goal: Patient/Family Short Term Goal  Description: Patient's Short Term Goal:     Interventions:   - Monitor lab  - PT/OT  - See additional Care Plan goals for specific interventions  Outcome: Progressing     Problem: PAIN - ADULT  Goal: Verbalizes/displays adequate comfort level or patient's stated pain goal  Description: INTERVENTIONS:  - Encourage pt to monitor pain and request assistance  - Assess pain using appropriate pain scale  - Administer analgesics based on type and severity of pain and evaluate response  - Implement non-pharmacological measures as appropriate and evaluate response  - Consider cultural and social influences on pain and pain management  - Manage/alleviate anxiety  - Utilize distraction and/or relaxation techniques  - Monitor for opioid side effects  - Notify MD/LIP if interventions unsuccessful or patient reports new pain  - Anticipate increased pain with activity and pre-medicate as appropriate  Outcome: Progressing     Problem: RISK FOR INFECTION - ADULT  Goal: Absence of fever/infection during anticipated neutropenic period  Description: INTERVENTIONS  - Monitor WBC  - Administer growth factors  as ordered  - Implement neutropenic guidelines  Outcome: Progressing     Problem: SAFETY ADULT - FALL  Goal: Free from fall injury  Description: INTERVENTIONS:  - Assess pt frequently for physical needs  - Identify cognitive and physical deficits and behaviors that affect risk of falls.  - [place] fall precautions as indicated by assessment.  - Educate pt/family on patient safety including physical limitations  - Instruct pt to call for assistance with activity based on assessment  - Modify environment to reduce risk of injury  - Provide assistive devices as appropriate  - Consider OT/PT consult to assist with strengthening/mobility  - Encourage toileting schedule  Outcome: Progressing     Problem: DISCHARGE PLANNING  Goal: Discharge to home or other facility with appropriate resources  Description: INTERVENTIONS:  - Identify barriers to discharge w/pt and caregiver  - Include patient/family/discharge partner in discharge planning  - Arrange for needed discharge resources and transportation as appropriate  - Identify discharge learning needs (meds, wound care, etc)  - Arrange for interpreters to assist at discharge as needed  - Consider post-discharge preferences of patient/family/discharge partner  - Complete POLST form as appropriate  - Assess patient's ability to be responsible for managing their own health  - Refer to Case Management Department for coordinating discharge planning if the patient needs post-hospital services based on physician/LIP order or complex needs related to functional status, cognitive ability or social support system  Outcome: Progressing     Problem: GENITOURINARY - ADULT  Goal: Absence of urinary retention  Description: INTERVENTIONS:  - Assess patient’s ability to void and empty bladder  - Monitor intake/output and perform bladder scan as needed  - Follow urinary retention protocol/standard of care  - Consider collaborating with pharmacy to review patient's medication profile  -  Implement strategies to promote bladder emptying  Outcome: Julio César Aguilar was resting in bed, family was at bedside. Pt alert and verbally responsive, cooperative with staff. He was up x 2 assist to the rolling chair d/t gen weakness. IVF infusing. He's got a umaña catheter, intact, and draining. He's on tele. Plan for discharge back to home with [facility] when medically stable.

## 2024-12-25 NOTE — PROGRESS NOTES
[facility]  part of [facility]    Progress Note    Jeff Lamb Patient Status:  Inpatient    3/14/1931 MRN [id number]   Location [facility] 4W/SW/SE Attending Patrice Madrid MD   Hosp Day # 3 PCP DEBRA DENNY MD       Subjective:   Jeff Lamb is a(n) 89 year old male was seen and examined  Resting in bed comfortably  Eating lunch  AAOX4. Family at bedside   No acute distress   Shields put back in yesterday  No cp, sob, f,c,n,v, abd pain or HA     Objective:   Blood pressure 111/68, pulse 89, temperature 98.3 °F (36.8 °C), temperature source Oral, resp. rate 17, weight 165 lb 2 oz (74.9 kg), SpO2 99%.    General: No acute distress.    HEENT: Normocephalic, atraumatic.  Neck: Supple.  Respiratory: Normal effort.  CTAB  Cardiovascular: S1, S2 regular.  Normal rate.   Abdomen: Soft, Nontender distended.  Neurologic: Alert, Nonfocal.  Musculoskeletal: No tenderness or deformity.  Extremities: No edema  : Three-way Shields catheter/CBI. Yellow urine   Psychiatric: calm, cooperative, pleasant        Current Inpatient Medications:     Current Facility-Administered Medications:     calcium carbonate (Tums) chewable tab 1,000 mg, 1,000 mg, Oral, TID PRN    donepezil (Aricept) tab 5 mg, 5 mg, Oral, Nightly    levothyroxine (Synthroid) tab 25 mcg, 25 mcg, Oral, Before breakfast    mirtazapine (Remeron) tab 15 mg, 15 mg, Oral, Nightly    acetaminophen (Tylenol Extra Strength) tab 500 mg, 500 mg, Oral, Q4H PRN    tamsulosin (Flomax) cap 0.4 mg, 0.4 mg, Oral, Daily    midodrine (ProAmatine) tab 5 mg, 5 mg, Oral, Once    LORazepam (Ativan) 2 mg/mL injection 0.5 mg, 0.5 mg, Intravenous, Q6H PRN    OLANZapine (ZyPREXA Zydis) disintegrating tab 5 mg, 5 mg, Oral, Nightly    haloperidol lactate (Haldol) 5 MG/ML injection 2 mg, 2 mg, Intravenous, Q6H PRN    Shields / urology care, , , Until Discontinued **AND** Shields / urology care, , , Continuous **AND** sodium chloride 0.9 % irrigation 3,000 mL, 3,000  mL, Irrigation, Continuous    Assessment and Plan:   Gross hematuria  RESOLVED  Bladder cancer with most recent TURBT 10/1/2024  Urology was consulted, started on CBI  Now s/p CYSTOSCOPY, CLOT EVACUATION, FULGURATION OF BLEEDING POD#3  CBI is clamped currently and urine is clear  Shields removed on 12/24 but pt unable to void, placed back in  Hold eliquis  Currently holding off on treatment of bladder CA/BCG per family/chart review     Altered mental status, possibly metabolic/toxic  RESOLVED  -Monitor     Acute blood loss on chronic anemia.  Previous hemoglobin 10.7  -Type, screen.  Transfuse to keep hemoglobin greater than 7  -Discussed with family, agreeable  -hgb 7.4     Acute kidney injury.  Last creatinine 1.07  -Likely due to obstruction from clots  -Cr normal now     History of PAF on Eliquis  -Hold Eliquis  -Continue other home medications  -cardiology consult for further evaluation and management      Hypothyroidism  History of hemochromatosis  -Continue home meds     Quality:  DVT Prophylaxis: CT  CODE status: Presumed full code  SANDOVAL: TBD    Results:     Recent Labs   Lab 12/23/24  1221 12/24/24  0356 12/24/24  0955 12/25/24  0424   RBC 2.14* 2.08* 2.41* 2.31*   HGB 7.3* 6.9* 7.9* 7.4*   HCT 21.6* 20.2* 23.2* 22.1*   .9* 97.1 96.3 95.7   MCH 34.1* 33.2 32.8 32.0   MCHC 33.8 34.2 34.1 33.5   RDW 23.6* 23.0* 23.4* 23.4*   NEPRELIM 6.53 4.29  --  4.21   WBC 8.8 6.6 6.8 6.5   .0 293.0 268.0 302.0         Recent Labs   Lab 12/23/24  1221 12/24/24  0356 12/25/24  0424   * 94 98   BUN 28* 26* 21   CREATSERUM 1.29 0.95 0.85   EGFRCR 52* 75 81   CA 8.9 8.6* 8.4*    138 139   K 4.1 3.9 3.8    108 109   CO2 23.0 25.0 25.0         Imaging:   No results found.          Patrice Madrid MD  12/25/2024

## 2024-12-25 NOTE — PLAN OF CARE
Jeff is A&OX3 on room air. Has umaña in place due to urine retention. On telemetry. Anticoagulants on hold. Denies any pain. Ambulating x2 with a rolling walker. Plan is for [facility] pending list given and choice.  Problem: Patient Centered Care  Goal: Patient preferences are identified and integrated in the patient's plan of care  Description: Interventions:  - What would you like us to know as we care for you?   - Provide timely, complete, and accurate information to patient/family  - Incorporate patient and family knowledge, values, beliefs, and cultural backgrounds into the planning and delivery of care  - Encourage patient/family to participate in care and decision-making at the level they choose  - Honor patient and family perspectives and choices  Outcome: Progressing     Problem: PAIN - ADULT  Goal: Verbalizes/displays adequate comfort level or patient's stated pain goal  Description: INTERVENTIONS:  - Encourage pt to monitor pain and request assistance  - Assess pain using appropriate pain scale  - Administer analgesics based on type and severity of pain and evaluate response  - Implement non-pharmacological measures as appropriate and evaluate response  - Consider cultural and social influences on pain and pain management  - Manage/alleviate anxiety  - Utilize distraction and/or relaxation techniques  - Monitor for opioid side effects  - Notify MD/LIP if interventions unsuccessful or patient reports new pain  - Anticipate increased pain with activity and pre-medicate as appropriate  Outcome: Progressing     Problem: RISK FOR INFECTION - ADULT  Goal: Absence of fever/infection during anticipated neutropenic period  Description: INTERVENTIONS  - Monitor WBC  - Administer growth factors as ordered  - Implement neutropenic guidelines  Outcome: Progressing     Problem: SAFETY ADULT - FALL  Goal: Free from fall injury  Description: INTERVENTIONS:  - Assess pt frequently for physical needs  - Identify cognitive and  physical deficits and behaviors that affect risk of falls.  - [place] fall precautions as indicated by assessment.  - Educate pt/family on patient safety including physical limitations  - Instruct pt to call for assistance with activity based on assessment  - Modify environment to reduce risk of injury  - Provide assistive devices as appropriate  - Consider OT/PT consult to assist with strengthening/mobility  - Encourage toileting schedule  Outcome: Progressing

## 2024-12-26 LAB
BASOPHILS # BLD: 0.12 X10(3) UL (ref 0–0.2)
BASOPHILS NFR BLD: 2 %
BILIRUB UR QL: NEGATIVE
CLARITY UR: CLEAR
DEPRECATED RDW RBC AUTO: 81.2 FL (ref 35.1–46.3)
EOSINOPHIL # BLD: 0.06 X10(3) UL (ref 0–0.7)
EOSINOPHIL NFR BLD: 1 %
ERYTHROCYTE [DISTWIDTH] IN BLOOD BY AUTOMATED COUNT: 23.1 % (ref 11–15)
GLUCOSE UR-MCNC: NORMAL MG/DL
HCT VFR BLD AUTO: 24 %
HGB BLD-MCNC: 7.9 G/DL
HGB UR QL STRIP.AUTO: NEGATIVE
KETONES UR-MCNC: NEGATIVE MG/DL
LEUKOCYTE ESTERASE UR QL STRIP.AUTO: 25
LYMPHOCYTES NFR BLD: 0.73 X10(3) UL (ref 1–4)
LYMPHOCYTES NFR BLD: 12 %
MAGNESIUM SERPL-MCNC: 1.9 MG/DL (ref 1.6–2.6)
MCH RBC QN AUTO: 31.9 PG (ref 26–34)
MCHC RBC AUTO-ENTMCNC: 32.9 G/DL (ref 31–37)
MCV RBC AUTO: 96.8 FL
METAMYELOCYTES # BLD: 0.12 X10(3) UL
METAMYELOCYTES NFR BLD: 2 %
MONOCYTES # BLD: 0.37 X10(3) UL (ref 0.1–1)
MONOCYTES NFR BLD: 6 %
NEUTROPHILS # BLD AUTO: 3.66 X10 (3) UL (ref 1.5–7.7)
NEUTROPHILS NFR BLD: 76 %
NEUTS HYPERSEG # BLD: 4.64 X10(3) UL (ref 1.5–7.7)
NITRITE UR QL STRIP.AUTO: NEGATIVE
NRBC BLD MANUAL-RTO: 3 %
PH UR: 7 [PH] (ref 5–8)
PLATELET # BLD AUTO: 339 10(3)UL (ref 150–450)
PLATELET MORPHOLOGY: NORMAL
PROMYELOCYTES # BLD: 0.06 X10(3) UL
PROMYELOCYTES NFR BLD: 1 %
PROT UR-MCNC: NEGATIVE MG/DL
RBC # BLD AUTO: 2.48 X10(6)UL
SP GR UR STRIP: 1.01 (ref 1–1.03)
TOTAL CELLS COUNTED BLD: 100
UROBILINOGEN UR STRIP-ACNC: NORMAL
WBC # BLD AUTO: 6.1 X10(3) UL (ref 4–11)

## 2024-12-26 PROCEDURE — 99233 SBSQ HOSP IP/OBS HIGH 50: CPT | Performed by: INTERNAL MEDICINE

## 2024-12-26 RX ORDER — TRAZODONE HYDROCHLORIDE 50 MG/1
25 TABLET, FILM COATED ORAL NIGHTLY
Status: DISCONTINUED | OUTPATIENT
Start: 2024-12-26 | End: 2024-12-27

## 2024-12-26 RX ORDER — HALOPERIDOL 5 MG/ML
5 INJECTION INTRAMUSCULAR EVERY 6 HOURS PRN
Status: DISCONTINUED | OUTPATIENT
Start: 2024-12-26 | End: 2024-12-27

## 2024-12-26 NOTE — PHYSICAL THERAPY NOTE
Attempted treatment and pt is on hold at this time. Per RN pt is not appropriate for therapy. Will follow up later if appropriate. Discussed with the RN.

## 2024-12-26 NOTE — OCCUPATIONAL THERAPY NOTE
Per RN, pt not appropriate for therapy at this time. Treatment deferred. Will follow up and initiate therapy when appropriate

## 2024-12-26 NOTE — PLAN OF CARE
Patient is oriented to self. Confused at times, easily reoriented. MD aware. See orders. VSS. RA. SCD's for DVT prophylaxis. Tolerating diet no complaints of nausea. Voiding with umaña catheter intact and patent. Clear yellow output. Saline locked. Denies pain at this time. Family member at the bedside overnight. Bed locked and lowered. Frequent rounding in place. Call light within reach. Plan for rehab pending medical clearance/insurance auth/ facility choice.    0358- PRN haldol given for agitation.    0655- Ua collected and sent.     Problem: Patient Centered Care  Goal: Patient preferences are identified and integrated in the patient's plan of care  Description: Interventions:  - What would you like us to know as we care for you?   - Provide timely, complete, and accurate information to patient/family  - Incorporate patient and family knowledge, values, beliefs, and cultural backgrounds into the planning and delivery of care  - Encourage patient/family to participate in care and decision-making at the level they choose  - Honor patient and family perspectives and choices  Outcome: Not Progressing     Problem: Patient/Family Goals  Goal: Patient/Family Long Term Goal  Description: Patient's Long Term Goal:     Interventions:  -   - See additional Care Plan goals for specific interventions  Outcome: Not Progressing  Goal: Patient/Family Short Term Goal  Description: Patient's Short Term Goal:     Interventions:   -   - See additional Care Plan goals for specific interventions  Outcome: Not Progressing     Problem: PAIN - ADULT  Goal: Verbalizes/displays adequate comfort level or patient's stated pain goal  Description: INTERVENTIONS:  - Encourage pt to monitor pain and request assistance  - Assess pain using appropriate pain scale  - Administer analgesics based on type and severity of pain and evaluate response  - Implement non-pharmacological measures as appropriate and evaluate response  - Consider cultural and  social influences on pain and pain management  - Manage/alleviate anxiety  - Utilize distraction and/or relaxation techniques  - Monitor for opioid side effects  - Notify MD/LIP if interventions unsuccessful or patient reports new pain  - Anticipate increased pain with activity and pre-medicate as appropriate  Outcome: Not Progressing     Problem: RISK FOR INFECTION - ADULT  Goal: Absence of fever/infection during anticipated neutropenic period  Description: INTERVENTIONS  - Monitor WBC  - Administer growth factors as ordered  - Implement neutropenic guidelines  Outcome: Not Progressing     Problem: SAFETY ADULT - FALL  Goal: Free from fall injury  Description: INTERVENTIONS:  - Assess pt frequently for physical needs  - Identify cognitive and physical deficits and behaviors that affect risk of falls.  - [place] fall precautions as indicated by assessment.  - Educate pt/family on patient safety including physical limitations  - Instruct pt to call for assistance with activity based on assessment  - Modify environment to reduce risk of injury  - Provide assistive devices as appropriate  - Consider OT/PT consult to assist with strengthening/mobility  - Encourage toileting schedule  Outcome: Not Progressing     Problem: DISCHARGE PLANNING  Goal: Discharge to home or other facility with appropriate resources  Description: INTERVENTIONS:  - Identify barriers to discharge w/pt and caregiver  - Include patient/family/discharge partner in discharge planning  - Arrange for needed discharge resources and transportation as appropriate  - Identify discharge learning needs (meds, wound care, etc)  - Arrange for interpreters to assist at discharge as needed  - Consider post-discharge preferences of patient/family/discharge partner  - Complete POLST form as appropriate  - Assess patient's ability to be responsible for managing their own health  - Refer to Case Management Department for coordinating discharge planning if the  patient needs post-hospital services based on physician/LIP order or complex needs related to functional status, cognitive ability or social support system  Outcome: Not Progressing     Problem: GENITOURINARY - ADULT  Goal: Absence of urinary retention  Description: INTERVENTIONS:  - Assess patient’s ability to void and empty bladder  - Monitor intake/output and perform bladder scan as needed  - Follow urinary retention protocol/standard of care  - Consider collaborating with pharmacy to review patient's medication profile  - Implement strategies to promote bladder emptying  Outcome: Not Progressing

## 2024-12-26 NOTE — CM/SW NOTE
ANU followed up on DC planning.     Email sent to dtr Elysia to review per ANU perdomo's notes    Will need choice/ insurance auth     Insurance Auth needed for Rehab    UPDATE:     Choice is BTE - pending insurance auth    Reserved BTE in aidin    Notified pt's insurance is through [employer]    PLAN: [facility] - pending [facility] auth    Marimar REY LSW, MSW ext. [phone]

## 2024-12-26 NOTE — CM/SW NOTE
Prior Authorization - Destination  Destination Type: Skilled nursing facility  Service Provider: BTE  Payer Communication Destination Comments: [facility] ID [id number]  Prior Authorization Status: Submitted/Pending          Gwen Guaman DSC

## 2024-12-26 NOTE — PROGRESS NOTES
[facility]  part of [facility]    Progress Note    Jeff Lamb Patient Status:  Inpatient    3/14/1931 MRN [id number]   Location [facility] 4W/SW/SE Attending Patrice Madrid MD   Hosp Day # 4 PCP DEBRA DENNY MD       Subjective:   Jeff Lamb is a(n) 89 year old male was seen and examined  Resting in bed comfortably  Currently asleep after agitation overnight given Haldol and Ativan this morning.  Daughter at bedside.    Objective:   Blood pressure 115/68, pulse 81, temperature 97.6 °F (36.4 °C), temperature source Temporal, resp. rate 17, height 5' 10\" (1.778 m), weight 165 lb 2 oz (74.9 kg), SpO2 100%.    General: No acute distress.  Sleeping  HEENT: Normocephalic, atraumatic.  Neck: Supple.  Respiratory: Normal effort.  CTAB  Cardiovascular: S1, S2 regular.  Normal rate.   Abdomen: Soft, Nontender distended.  Neurologic: Alert, Nonfocal.  Musculoskeletal: No tenderness or deformity.  Extremities: No edema  : Three-way Shields catheter/CBI. Yellow urine   Psychiatric: calm, cooperative, pleasant        Current Inpatient Medications:     Current Facility-Administered Medications:     haloperidol lactate (Haldol) 5 MG/ML injection 5 mg, 5 mg, Intravenous, Q6H PRN    traZODone (Desyrel) tab 25 mg, 25 mg, Oral, Nightly    calcium carbonate (Tums) chewable tab 1,000 mg, 1,000 mg, Oral, TID PRN    donepezil (Aricept) tab 5 mg, 5 mg, Oral, Nightly    levothyroxine (Synthroid) tab 25 mcg, 25 mcg, Oral, Before breakfast    acetaminophen (Tylenol Extra Strength) tab 500 mg, 500 mg, Oral, Q4H PRN    tamsulosin (Flomax) cap 0.4 mg, 0.4 mg, Oral, Daily    midodrine (ProAmatine) tab 5 mg, 5 mg, Oral, Once    Shields / urology care, , , Until Discontinued **AND** Shields / urology care, , , Continuous **AND** sodium chloride 0.9 % irrigation 3,000 mL, 3,000 mL, Irrigation, Continuous    Assessment and Plan:   Gross hematuria  RESOLVED  Bladder cancer with most recent TURBT 10/1/2024  Urology  was consulted, started on CBI  Now s/p CYSTOSCOPY, CLOT EVACUATION, FULGURATION OF BLEEDING   CBI is clamped currently and urine is clear  Shields removed on 12/24 but pt unable to void, placed back in  Hold eliquis  Currently holding off on treatment of bladder CA/BCG per family/chart review     Altered mental status, possibly metabolic/toxic  12/26: Had significant agitation overnight received Zyprexa, Haldol 2 mg IV and then Ativan this morning.  Currently sleeping.  Discussed with daughter at length we will stop Zyprexa and mirtazapine at this time give trazodone tonight discontinue Ativan as needed and increase dose of Haldol to 5 mg IV as needed.  Hopefully mental status clears and patient can be discharged to rehab as planned.    Continue to monitor         Acute blood loss on chronic anemia.  Previous hemoglobin 10.7  -Type, screen.  Transfuse to keep hemoglobin greater than 7  -Discussed with family, agreeable  -hgb 7.4     Acute kidney injury.  Last creatinine 1.07  -Likely due to obstruction from clots  -Cr normal now     History of PAF on Eliquis  -Hold Eliquis  -Continue other home medications  -cardiology consult for further evaluation and management      Hypothyroidism  History of hemochromatosis  -Continue home meds     Quality:  DVT Prophylaxis: CT  CODE status: Presumed full code  SANDOVAL: TBD    Results:     Recent Labs   Lab 12/24/24  0356 12/24/24  0955 12/25/24  0424 12/26/24  0130   RBC 2.08* 2.41* 2.31* 2.48*   HGB 6.9* 7.9* 7.4* 7.9*   HCT 20.2* 23.2* 22.1* 24.0*   MCV 97.1 96.3 95.7 96.8   MCH 33.2 32.8 32.0 31.9   MCHC 34.2 34.1 33.5 32.9   RDW 23.0* 23.4* 23.4* 23.1*   NEPRELIM 4.29  --  4.21 3.66   WBC 6.6 6.8 6.5 6.1   .0 268.0 302.0 339.0         Recent Labs   Lab 12/23/24  1221 12/24/24  0356 12/25/24  0424   * 94 98   BUN 28* 26* 21   CREATSERUM 1.29 0.95 0.85   EGFRCR 52* 75 81   CA 8.9 8.6* 8.4*    138 139   K 4.1 3.9 3.8    108 109   CO2 23.0 25.0 25.0          Imaging:   No results found.

## 2024-12-27 VITALS
SYSTOLIC BLOOD PRESSURE: 93 MMHG | RESPIRATION RATE: 16 BRPM | OXYGEN SATURATION: 100 % | BODY MASS INDEX: 23.64 KG/M2 | HEIGHT: 70 IN | DIASTOLIC BLOOD PRESSURE: 76 MMHG | WEIGHT: 165.13 LBS | HEART RATE: 79 BPM | TEMPERATURE: 98 F

## 2024-12-27 PROCEDURE — 99239 HOSP IP/OBS DSCHRG MGMT >30: CPT | Performed by: INTERNAL MEDICINE

## 2024-12-27 RX ORDER — TRAZODONE HYDROCHLORIDE 50 MG/1
25 TABLET, FILM COATED ORAL NIGHTLY
Qty: 15 TABLET | Refills: 0 | Status: SHIPPED | OUTPATIENT
Start: 2024-12-27 | End: 2025-01-26

## 2024-12-27 NOTE — PROGRESS NOTES
Patient being discharged to [facility] via ambulance at 1600. All patient belongings packed and taken with patient family. Discharge paper work given to ambulance attendants. Report called and given to RN at facility.

## 2024-12-27 NOTE — DISCHARGE SUMMARY
Discharge Summary     Jeff Lamb Patient Status:  Inpatient    3/14/1931 MRN [id number]   Location [facility] 4W/SW/SE Attending Vaishali Dupree MD   Hosp Day # 5 PCP DEBRA DENNY MD     Date of Admission: 2024  Date of Discharge: 2024  Discharge Disposition: SNF    Discharge Diagnosis: ***    History of Present Illness: ***  {H&P Notes :8307}          Brief Synopsis:     Gross hematuria  RESOLVED  Bladder cancer with most recent TURBT 10/1/2024  Urology was consulted, started on CBI  Now s/p CYSTOSCOPY, CLOT EVACUATION, FULGURATION OF BLEEDING   CBI is clamped currently and urine is clear  Shields removed on  but pt unable to void, placed back in  Hold eliquis  Currently holding off on treatment of bladder CA/BCG per family/chart review     Altered mental status, possibly metabolic/toxic  : Had significant agitation overnight received Zyprexa, Haldol 2 mg IV and then Ativan this morning.  Currently sleeping.  Discussed with daughter at length we will stop Zyprexa and mirtazapine at this time give trazodone tonight discontinue Ativan as needed and increase dose of Haldol to 5 mg IV as needed.  Hopefully mental status clears and patient can be discharged to rehab as planned.    Continue to monitor           Acute blood loss on chronic anemia.  Previous hemoglobin 10.7  -Type, screen.  Transfuse to keep hemoglobin greater than 7  -Discussed with family, agreeable  -hgb 7.4     Acute kidney injury.  Last creatinine 1.07  -Likely due to obstruction from clots  -Cr normal now     History of PAF on Eliquis  -Hold Eliquis  -Continue other home medications  -cardiology consult for further evaluation and management      Hypothyroidism  History of hemochromatosis  -Continue home meds         Lace+ Score: 56  59-90 High Risk  29-58 Medium Risk  0-28   Low Risk       TCM Follow-Up Recommendation:  {Care Managers will evaluate the need for follow-up for all patients ages 50+, and  high/moderate risk patients ages 25-49. Low risk patients (LACE < 29) will only be evaluated if the \"Still recommend for TCM follow-up\" option is selected from this list.:7396}    Procedures during hospitalization:   ***    Incidental or significant findings and recommendations (brief descriptions):  ***    Lab/Test results pending at Discharge:   ***    Consultants:  ***    Discharge Medication List:     Discharge Medications        START taking these medications        Instructions Prescription details   traZODone 50 MG Tabs  Commonly known as: Desyrel      Take 0.5 tablets (25 mg total) by mouth nightly.   Stop taking on: January 26, 2025  Quantity: 15 tablet  Refills: 0            CONTINUE taking these medications        Instructions Prescription details   donepezil 5 MG Tabs  Commonly known as: Aricept      Take 1 tablet (5 mg total) by mouth nightly.   Refills: 0     levothyroxine 25 MCG Tabs  Commonly known as: Synthroid      Take 1 tablet (25 mcg total) by mouth before breakfast.   Quantity: 1 tablet  Refills: 0     tamsulosin 0.4 MG Caps  Commonly known as: Flomax      Take 1 capsule (0.4 mg total) by mouth daily.   Refills: 0            STOP taking these medications      Eliquis 5 MG Tabs  Generic drug: apixaban        furosemide 20 MG Tabs  Commonly known as: Lasix        mirtazapine 15 MG Tabs  Commonly known as: Remeron                  Where to Get Your Medications        These medications were sent to [id number] - [facility] [address] [facility] [phone], [phone]  [address] [place] [address], [facility] [address]      Phone: [phone]   traZODone 50 MG Tabs         Follow-up appointment:   Villa Germain PA-C  [address] [place] [address] [phone]    Schedule an appointment as soon as possible for a visit in 2 week(s)  For wound re-check    Appointments for Next 30 Days 12/27/2024 - 1/26/2025      None            Supplementary Documentation:   [place] reviewed[place] ***    Vital  signs:  Temp:  [97.5 °F (36.4 °C)-98.4 °F (36.9 °C)] 97.5 °F (36.4 °C)  Pulse:  [79-82] 79  Resp:  [16-17] 16  BP: ()/(62-76) 93/76  SpO2:  [100 %] 100 %    Physical Exam:    General:  NAD  Cardiovascular:  S1, S2    -----------------------------------------------------------------------------------------------  PATIENT DISCHARGE INSTRUCTIONS: See electronic chart    Tip: Documentation requirements: For split shared discharge, BOTH providers need to document specific floor, unit, and time spent on the discharge.  The note needs to be signed by the provider with > 50% of time and bill under their NPI.   Time spent:  ***         Vaishali Dupree MD

## 2024-12-27 NOTE — CM/SW NOTE
Prior Authorization - Destination  Destination Type: Skilled nursing facility  Service Provider: BTE  Payer Communication Destination Comments: [facility] cordova [id number]  Prior Authorization Status: Approved  Prior Authorization Start Date: 12/27/24  Prior Authorization Number: [id number]  12/27 to 12/31.      Gwen Guaman DSC

## 2024-12-27 NOTE — PLAN OF CARE
Jeff is oriented to self. RA. VSS. Tolerating diet. Denies nausea. Denies pain. Voiding via umaña catheter. Intact and patent. Yellow clear output noted. Bed alarm on. Camera monitoring in place for patient safety. Family member at the bedside. Patient slept well overnight. Call light within reach. Bed locked and lowered. Plan for BTE on discharge pending medical clearance.     Problem: Patient Centered Care  Goal: Patient preferences are identified and integrated in the patient's plan of care  Description: Interventions:  - What would you like us to know as we care for you?   - Provide timely, complete, and accurate information to patient/family  - Incorporate patient and family knowledge, values, beliefs, and cultural backgrounds into the planning and delivery of care  - Encourage patient/family to participate in care and decision-making at the level they choose  - Honor patient and family perspectives and choices  Outcome: Progressing     Problem: Patient/Family Goals  Goal: Patient/Family Long Term Goal  Description: Patient's Long Term Goal:     Interventions:  -   - See additional Care Plan goals for specific interventions  Outcome: Progressing  Goal: Patient/Family Short Term Goal  Description: Patient's Short Term Goal:     Interventions:   -   - See additional Care Plan goals for specific interventions  Outcome: Progressing     Problem: PAIN - ADULT  Goal: Verbalizes/displays adequate comfort level or patient's stated pain goal  Description: INTERVENTIONS:  - Encourage pt to monitor pain and request assistance  - Assess pain using appropriate pain scale  - Administer analgesics based on type and severity of pain and evaluate response  - Implement non-pharmacological measures as appropriate and evaluate response  - Consider cultural and social influences on pain and pain management  - Manage/alleviate anxiety  - Utilize distraction and/or relaxation techniques  - Monitor for opioid side effects  - Notify  MD/LIP if interventions unsuccessful or patient reports new pain  - Anticipate increased pain with activity and pre-medicate as appropriate  Outcome: Progressing     Problem: RISK FOR INFECTION - ADULT  Goal: Absence of fever/infection during anticipated neutropenic period  Description: INTERVENTIONS  - Monitor WBC  - Administer growth factors as ordered  - Implement neutropenic guidelines  Outcome: Progressing     Problem: SAFETY ADULT - FALL  Goal: Free from fall injury  Description: INTERVENTIONS:  - Assess pt frequently for physical needs  - Identify cognitive and physical deficits and behaviors that affect risk of falls.  - [place] fall precautions as indicated by assessment.  - Educate pt/family on patient safety including physical limitations  - Instruct pt to call for assistance with activity based on assessment  - Modify environment to reduce risk of injury  - Provide assistive devices as appropriate  - Consider OT/PT consult to assist with strengthening/mobility  - Encourage toileting schedule  Outcome: Progressing     Problem: DISCHARGE PLANNING  Goal: Discharge to home or other facility with appropriate resources  Description: INTERVENTIONS:  - Identify barriers to discharge w/pt and caregiver  - Include patient/family/discharge partner in discharge planning  - Arrange for needed discharge resources and transportation as appropriate  - Identify discharge learning needs (meds, wound care, etc)  - Arrange for interpreters to assist at discharge as needed  - Consider post-discharge preferences of patient/family/discharge partner  - Complete POLST form as appropriate  - Assess patient's ability to be responsible for managing their own health  - Refer to Case Management Department for coordinating discharge planning if the patient needs post-hospital services based on physician/LIP order or complex needs related to functional status, cognitive ability or social support system  Outcome: Progressing      Problem: GENITOURINARY - ADULT  Goal: Absence of urinary retention  Description: INTERVENTIONS:  - Assess patient’s ability to void and empty bladder  - Monitor intake/output and perform bladder scan as needed  - Follow urinary retention protocol/standard of care  - Consider collaborating with pharmacy to review patient's medication profile  - Implement strategies to promote bladder emptying  Outcome: Progressing     Problem: Delirium  Goal: Minimize duration of delirium  Description: Interventions:  - Encourage use of hearing aids, eye glasses  - Promote highest level of mobility daily  - Provide frequent reorientation  - Promote wakefulness i.e. lights on, blinds open  - Promote sleep, encourage patient's normal rest cycle i.e. lights off, TV off, minimize noise and interruptions  - Encourage family to assist in orientation and promotion of home routines  Outcome: Progressing

## 2024-12-27 NOTE — PLAN OF CARE
Jeff is A&Ox2 on room air. On telemetry. Was very lethargic after ativan given in the morning due to agitation and hallucination. When he awoke, orientation level was better than it was at night per family. Plan is for rehab pending acceptance .  Problem: Patient Centered Care  Goal: Patient preferences are identified and integrated in the patient's plan of care  Description: Interventions:  - What would you like us to know as we care for you?   - Provide timely, complete, and accurate information to patient/family  - Incorporate patient and family knowledge, values, beliefs, and cultural backgrounds into the planning and delivery of care  - Encourage patient/family to participate in care and decision-making at the level they choose  - Honor patient and family perspectives and choices  Outcome: Progressing     Problem: PAIN - ADULT  Goal: Verbalizes/displays adequate comfort level or patient's stated pain goal  Description: INTERVENTIONS:  - Encourage pt to monitor pain and request assistance  - Assess pain using appropriate pain scale  - Administer analgesics based on type and severity of pain and evaluate response  - Implement non-pharmacological measures as appropriate and evaluate response  - Consider cultural and social influences on pain and pain management  - Manage/alleviate anxiety  - Utilize distraction and/or relaxation techniques  - Monitor for opioid side effects  - Notify MD/LIP if interventions unsuccessful or patient reports new pain  - Anticipate increased pain with activity and pre-medicate as appropriate  Outcome: Progressing     Problem: RISK FOR INFECTION - ADULT  Goal: Absence of fever/infection during anticipated neutropenic period  Description: INTERVENTIONS  - Monitor WBC  - Administer growth factors as ordered  - Implement neutropenic guidelines  Outcome: Progressing

## 2024-12-27 NOTE — OCCUPATIONAL THERAPY NOTE
OCCUPATIONAL THERAPY TREATMENT NOTE - INPATIENT        Room Number: 405/405-A  Presenting Problem: generalized weakness and blood in urine, SDH    Problem List  Principal Problem:    Gross hematuria  Active Problems:    Anemia, unspecified type    VERONICA (acute kidney injury) (HCC)    Delirium due to another medical condition    Subdural hematoma (HCC)      OCCUPATIONAL THERAPY ASSESSMENT   Patient demonstrates good  progress this session, goals remain in progress.    Patient is requiring mod/maximum assist as a result of the following impairments: decreased functional strength and decreased endurance.    Patient continues to function below baseline with adls and functional mobility.  Next session anticipate patient to progress bed mobility, transfers, and functional standing tolerance.  Occupational Therapy will continue to follow patient for duration of hospitalization.    Patient continues to benefit from continued skilled OT services: to promote return to prior level of function and safety with continuous assistance and gradual rehabilitative therapy.     PLAN DURING HOSPITALIZATION  OT Device Recommendations: Transfer tub bench  OT Treatment Plan: Compensatory technique education;Patient/Family training;Patient/Family education;Endurance training;Functional transfer training;ADL training     SUBJECTIVE  \"It feels good to move. Exercise is important\"    OBJECTIVE  Precautions: Bed/chair alarm;HOB elevated 30 degrees    PAIN ASSESSMENT  Ratin    ACTIVITY TOLERANCE  good    O2 SATURATIONS  Activity on room air    ACTIVITIES OF DAILY LIVING ASSESSMENT  AM-PAC ‘6-Clicks’ Inpatient Daily Activity Short Form  How much help from another person does the patient currently need…  -   Putting on and taking off regular lower body clothing?: A Lot  -   Bathing (including washing, rinsing, drying)?: A Lot  -   Toileting, which includes using toilet, bedpan or urinal? : A Lot  -   Putting on and taking off regular upper body  clothing?: A Little  -   Taking care of personal grooming such as brushing teeth?: A Little  -   Eating meals?: A Little    AM-PAC Score:  Score: 15  Approx Degree of Impairment: 56.46%  Standardized Score (AM-PAC Scale): 34.69  CMS Modifier (G-Code): CK    FUNCTIONAL ADL ASSESSMENT  Eating: setup assist  Grooming: min/setup assist  UB Dressing: min assist  LB Dressing: max assist  Toileting: na    Skilled Therapy Provided: RN contacted prior to start of care. Treatment coordinated w/ PT. Pt agreeable to participation in therapy. Gait belt used during dynamic activity. Pt received in bed, alert and oriented to self;son at bedside.  Pt currently requires max a to transfer supine<>sit at eob. Pt initially w/ lateral lean in sitting but able to self correct w/ cuing from son. Pt required max a for sit<>stand from bed/chair. Pt ambulating in the [place] w/ rw and min a;dylon slow, but significant lob noted. Pt requiring increased time and decreased activity tolerance for sustained dynamic tasks due to fatigue. Pt very motivated to participate in therapy     At end of session pt remaining up in chair w/ all needs in reach and alarm on;son at bedside. RN aware of pt's status and performance in therapy      EDUCATION PROVIDED  Patient Education : Role of Occupational Therapy; Plan of Care; Functional Transfer Techniques; Fall Prevention; Proper Body Mechanics  Patient's Response to Education: Verbalized Understanding; Requires Further Education  Family/Caregiver's Response to Education: Verbalized Understanding    The patient's Approx Degree of Impairment: 56.46% has been calculated based on documentation in the [facility] '6 clicks' Inpatient Daily Activity Short Form.  Research supports that patients with this level of impairment may benefit from IRF.  Final disposition will be made by interdisciplinary medical team.    Patient End of Session: Up in chair;Needs met;Call light within reach;RN aware of session/findings;All  patient questions and concerns addressed;Alarm set;Family present    OT Goals:   Patient will complete functional transfer with supervision, 2ww  Comment: pt required max a for sit<>stand    Patient will complete toileting with supervision  Comment: na    Patient will tolerate standing for 5 minutes in prep for adls with supervision   Comment:pt maintained static standing w/ rw and cga < 1 min    Patient will complete LB dressing with supervision  Comment:pt required max a to manage socks          Goals  on: 24  Frequency: 3x/week    Therapeutic Activity: 23 minutes

## 2024-12-27 NOTE — PHYSICAL THERAPY NOTE
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      Patient is requiring maximum assist as a result of the following impairments: decreased functional strength, decreased endurance/aerobic capacity, and decreased muscular endurance.     Patient continues to function below baseline with bed mobility, transfers, gait, and standing prolonged periods.  Next session anticipate patient to progress bed mobility, transfers, gait, and standing prolonged periods.  Physical Therapy will continue to follow patient for duration of hospitalization.    Patient continues to benefit from continued skilled PT services: to promote return to prior level of function and safety with continuous assistance and gradual rehabilitative therapy .    PLAN DURING HOSPITALIZATION  Nursing Mobility Recommendation : 1 Assist  PT Device Recommendation: Rolling walker  PT Treatment Plan: Bed mobility;Body mechanics;Coordination;Endurance;Energy conservation;Patient education;Gait training;Strengthening;Transfer training;Balance training  Frequency (Obs): 3-5x/week     SUBJECTIVE  Pt was agreeable to therapy session.         OBJECTIVE  Precautions: Bed/chair alarm;HOB elevated 30 degrees    WEIGHT BEARING RESTRICTION       PAIN ASSESSMENT   Ratin     Management Techniques: Activity promotion;Body mechanics;Relaxation;Repositioning    BALANCE  Static Sitting: Fair  Dynamic Sitting: Fair -  Static Standing:  Poor +  Dynamic Standing: Poor +    ACTIVITY TOLERANCE                          O2 WALK       AM-PAC '6-Clicks' INPATIENT SHORT FORM - BASIC MOBILITY  How much difficulty does the patient currently have...  Patient Difficulty: Turning over in bed (including adjusting bedclothes, sheets and blankets)?: A Lot   Patient Difficulty: Sitting down on and standing up from a chair with arms (e.g., wheelchair, bedside commode, etc.): A Lot   Patient Difficulty: Moving from lying on back to sitting on the side of the bed?: A Lot   How much help from another person does the patient currently need...   Help from Another: Moving to and from a bed to a chair (including a wheelchair)?: A Little   Help from Another: Need to walk in hospital room?: A Little   Help from Another: Climbing 3-5 steps with a railing?: Total     AM-PAC Score:  Raw Score: 13   Approx Degree of Impairment: 64.91%   Standardized Score (AM-PAC Scale): 36.74   CMS Modifier (G-Code): CL    FUNCTIONAL ABILITY STATUS  Functional Mobility/Gait Assessment  Gait Assistance: Minimum assistance  Distance (ft): 60'  Assistive Device: Rolling walker  Pattern: Shuffle (narrow MARYANN)  Rolling: maximum assist  Supine to Sit: maximum assist  Sit to Supine:  NT  Sit to Stand: maximum assist    Skilled Therapy Provided: Pt is received in the bed with his son present and was cleared for therapy session. Co treat session with OT to maximize pt outcome. Pt is max a with bed mobility and to transfer to the EOB. Pt required assist with his B LE's and his upper body to sit up and scoot to the EOB. Pt required extra time to perform. Pt sat EOB for a few minutes and denied any dizziness and light headedness. Pt then was max A with sit<>stand transfers with the RW and with holding onto the therapist. Pt performed SPT from th bed to the chair while holding onto the therapist also max A . Pt then took a few minute sitting rest break then was able to AMB about 60' with the RW min A for  balance and safety and with chair follow for safety. Pt with decreased dylon and shuffling steps. Pt with improved mobility this session. Pt after fatigue returned to the room in the chair. Pt is repositioned and left in the chair with all needs within reach and alarm system activated. Also with camera in place. Reported to the RN on the status of the pt.     The patient's Approx Degree of Impairment: 64.91% has been calculated based on documentation in the [facility] '6 clicks' Inpatient Daily Activity Short Form.  Research supports that patients with this level of impairment may benefit from Rehab.  Final disposition will be made by interdisciplinary medical team.        Patient End of Session: Up in chair;Needs met;Call light within reach;RN aware of session/findings;All patient questions and concerns addressed;Hospital anti-slip socks;Alarm set;Family present    CURRENT GOALS   Goals to be met by: 1/6/25  Patient Goal Patient's self-stated goal is: return to PLOF   Goal #1 Patient is able to demonstrate supine - sit EOB @ level: supervision      Goal #1   Current Status  max A    Goal #2 Patient is able to demonstrate transfers Sit to/from Stand at assistance level: CG with walker - rolling      Goal #2  Current Status  max A with the RW  and with holding onto the therapist   Goal #3 Patient is able to ambulate 100 feet with assist device: walker - rolling at assistance level: CGA   Goal #3   Current Status 60' with the RW min A and chair follow for safety.    Goal #4 Patient will negotiate 7 stairs/one curb w/ assistive device and CGA   Goal #4   Current Status  NT   Goal #5 Patient to demonstrate independence with home activity/exercise instructions provided to patient in preparation for discharge.   Goal #5   Current Status IN PROGRESS       Therapeutic Activity: 25 minutes

## 2024-12-30 ENCOUNTER — INITIAL APN SNF VISIT (OUTPATIENT)
Dept: INTERNAL MEDICINE CLINIC | Facility: SKILLED NURSING FACILITY | Age: 89
End: 2024-12-30

## 2024-12-30 VITALS — TEMPERATURE: 99 F | HEART RATE: 86 BPM | RESPIRATION RATE: 18 BRPM | OXYGEN SATURATION: 99 %

## 2024-12-30 DIAGNOSIS — F05 DELIRIUM DUE TO ANOTHER MEDICAL CONDITION: ICD-10-CM

## 2024-12-30 DIAGNOSIS — D64.9 ANEMIA, UNSPECIFIED TYPE: ICD-10-CM

## 2024-12-30 DIAGNOSIS — K59.00 CONSTIPATION, UNSPECIFIED CONSTIPATION TYPE: ICD-10-CM

## 2024-12-30 DIAGNOSIS — N17.9 AKI (ACUTE KIDNEY INJURY) (HCC): Primary | ICD-10-CM

## 2024-12-30 DIAGNOSIS — S06.5XAA SUBDURAL HEMATOMA (HCC): ICD-10-CM

## 2024-12-30 DIAGNOSIS — H90.3 SENSORINEURAL HEARING LOSS, BILATERAL: ICD-10-CM

## 2024-12-30 DIAGNOSIS — R31.0 GROSS HEMATURIA: ICD-10-CM

## 2024-12-30 NOTE — PROGRESS NOTES
Jeff Lamb  : 3/14/1931  Age 89 year old  male patient is admitted to [facility] for rehabilitation and strengthening.      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  Blood noticed the day prior.  Progressively worse. Has had mild suprapubic discomfort and was more confused.  At presentation to the ED he was noted with gross hematuria with clots. Vital stable. Labs with creatinine 1.73, hemoglobin 7.7, WBC 11.3. UA with WBC, RBC and rare bacteria.  A three-way Umaña catheter was placed and CBI initiated.  Empiric antibiotics with ceftriaxone initiated. Urology on consulted in hospital. Course complicated by subdural hematoma noticed on CT and neurosurgery was consulted. Repeat CTH was stable and no need for surgical interventions indicated. All anticoagulation held during the time. Course also complicated by VERONICA, AMS. VERONICA resolved with a 3 way umaña and agitation was treated with prn Zyprexa. After stabilization and treatment, pt was discharged to [facility] for rehab and strengthening.     Pt seen and examined as initial APRN visit. Pt is sitting up in chair, appears comfortable in NAD. Grand daughter is at the bedside. Pt reports feeling well, he had a PT session today which went well. Pt also a poor historian and could not tell me a clear story as far as what happened to him  in the hospital. Pt denies having chest pain or sob. Denies appetite change, n/v or abdominal pain. Grand daughter states he's been constipated and last BM was about 5 days ago. Umaña catheter remains in place. No other issues/concerns. Vss     No past medical history on file.  No past surgical history on file.  No family  history on file.  Social History     Socioeconomic History    Marital status:    Tobacco Use    Smoking status: Unknown     Social Drivers of Health     Food Insecurity: No Food Insecurity (12/22/2024)    Food Insecurity     Food Insecurity: Never true   Transportation Needs: No Transportation Needs (12/22/2024)    Transportation Needs     Lack of Transportation: No   Housing Stability: Low Risk  (12/22/2024)    Housing Stability     Housing Instability: No       IMMUNIZATIONS    There is no immunization history on file for this patient.     ALLERGIES:  Allergies[1]    CODE STATUS:  Full Code    ADVANCED CARE PLANNING TEAM: Will need family care plan     CURRENT MEDICATIONS - reviewed and updated on SNF [facility]     SUBJECTIVE    Pt seen and examined as initial APRN visit. Pt is sitting up in chair, appears comfortable in NAD. Grand daughter is at the bedside. Pt reports feeling well, he had a PT session today which went well. Pt also a poor historian and could not tell me a clear story as far as what happened to him  in the hospital. Pt denies having chest pain or sob. Denies appetite change, n/v or abdominal pain. Grand daughter states he's been constipated and last BM was about 5 days ago. Shields catheter remains in place. No other issues/concerns. Vss     PHYSICAL EXAM:  Vitals:    12/30/24 1416   BP: (P) 116/60   Pulse: 86   Resp: 18   Temp: 98.6 °F (37 °C)   SpO2: 99%      GENERAL HEALTH:well developed, well nourished, in no apparent distress   LINES, TUBES, DRAINS:   Shields catheter in place   SKIN: no rashes, no suspicious lesions  WOUND: see wound assessment,   EYES: PERRLA, EOMI, sclera anicteric, conjunctiva normal; there is no nystagmus, no drainage from eyes  HENT: normocephalic; normal nose, no nasal drainage, mucous membranes pink, moist, pharynx no exudate, no visible cerumen.   NECK:supple, FROM; no JVD, no TMG, no carotid bruits   BREAST: deferred exam   RESPIRATORY:clear to percussion and  auscultation  CARDIOVASCULAR: irreg, irreg rhythm   ABDOMEN:  normal active BS+, soft, nondistended; no organomegaly, no masses; no bruits; nontender, no guarding, no rebound tenderness.  :no suprapubic distension  LYMPHATIC:no lymphedema  MUSCULOSKELETAL: no acute synovitis upper or lower extremity   EXTREMITIES/VASCULAR:edema right LE and edema left LE  NEUROLOGIC:follows commands  PSYCHIATRIC: AO x 1-2, pleasant and cooperative     MEDICAL DECISION MAKING  Unable     DIAGNOSTICS REVIEWED AT THIS VISIT:  [facility] medical records    SEE PLAN BELOW    Gross hematuria - RESOLVED  - Bladder cancer with most recent TURBT 10/1/2024  - Urology was consulted in hospital and started on CBI  - Now s/p CYSTOSCOPY, CLOT EVACUATION, FULGURATION OF BLEEDING   - hematuria eventually resolved with CBI   - Shields removed on 12/24 but pt unable to void, placed back in  - Eliquis held   - Currently holding off on treatment of bladder CA/BCG per family/chart review  - per urology another voiding trial can be done at rehab facility around Jan 1st, per Dr. Mayte Wayne's notes  - will attempt another voiding trial Jan 1st   - continue flomax 0.4 mg daily   - monitor     Dementia   Altered mental status, possibly metabolic/toxic  - 12/26: Had significant agitation overnight received Zyprexa, Haldol 2 mg IV and then Ativan this morning. This was discussed with daughter at length  Zyprexa and mirtazapine were stopped at the time. Instead, trazodone given that night and Ativan and haldol ordered as needed.  - continue trazodone 25 mg hs   - continue Aricept 5 mg hs     SDH   - subdural hematoma noticed on CT   - neurosurgery, Dr. Villa Germain was consulted.   - Repeat CTH was stable and no need for surgical interventions indicated. All anticoagulation held during the time.   - Patient to follow-up as an outpatient in 2 weeks with a repeat CT head prior to determining reinitiation of anticoagulants     BPH   - continue flomax 0.4 mg daily       Acute kidney injury  - Last creatinine 1.07  - Likely due to obstruction from clots  - Cr improved   - weekly labs in rehab      History of PAF on Eliquis  - Hold Eliquis d/t recent hematuria   - cardiology to follow in rehab      Hypothyroidism  History of hemochromatosis  - Continue levothyroxine 25 mcg daily     Physical Deconditioning/Impaired mobility and ADLs/At risk for falling  - Fall Precautions  - PT/OT/ST to evaluate and treat  - [facility] team to establish care plan meeting with patient and POA/family as appropriate  - Anticipate DC on or before TBD; SW to assist patient/family w/ DC planning  - DC Plan:  TBD     Bowel regimen  - Monitor BM status   - start senokot 2 tabs daily   - continue miralax 17 g daily     Pain Management  - Offer to pre-medicate 30-60 min prior to therapy  - Physiatry evaluation with management appreciated  - Acetaminophen 650 mg every 6 hrs prn     LABS  - CBC/CMP weekly while in [facility]    FOLLOW UP APPOINTMENTS  No future appointments.     60 minutes spent w/ patient and staff, including but not limited to reviewing present status, needs, abilities with disciplines, reviewing medical records, vital signs, labs, completing med reconciliation and entering orders for continued care in [facility]      Note to patient: The 21st Century Cures Act makes medical notes like these available to patients in the interest of transparency. However, this is a medical document intended as peer to peer communication. It is written in medical language and may contain abbreviations or verbiage that are unfamiliar. It may appear blunt or direct. Medical documents are intended to carry relevant information, facts as evident, and the clinical opinion of the practitioner who signs the document.     Carola Segura, APRN  12/30/24         [1] No Known Allergies

## 2024-12-30 NOTE — PAYOR COMM NOTE
--------------  DISCHARGE REVIEW    Payor: [employer] [facility]  Subscriber #:  [id number]  Authorization Number: [id number]    Admit date: 12/22/24  Admit time:   2:16 AM  Discharge Date: 12/27/2024  4:55 PM     Admitting Physician: Belen Melo MD  Attending Physician:  No att. providers found  Primary Care Physician: Garrick Smalls MD

## 2024-12-31 ENCOUNTER — APPOINTMENT (OUTPATIENT)
Dept: CT IMAGING | Facility: HOSPITAL | Age: 89
End: 2024-12-31
Attending: EMERGENCY MEDICINE
Payer: MEDICARE

## 2024-12-31 ENCOUNTER — APPOINTMENT (OUTPATIENT)
Dept: GENERAL RADIOLOGY | Facility: HOSPITAL | Age: 89
End: 2024-12-31
Attending: EMERGENCY MEDICINE
Payer: MEDICARE

## 2024-12-31 ENCOUNTER — HOSPITAL ENCOUNTER (EMERGENCY)
Facility: HOSPITAL | Age: 89
Discharge: HOME | End: 2024-12-31
Attending: EMERGENCY MEDICINE
Payer: MEDICARE

## 2024-12-31 VITALS
HEART RATE: 66 BPM | OXYGEN SATURATION: 96 % | RESPIRATION RATE: 14 BRPM | TEMPERATURE: 98 F | DIASTOLIC BLOOD PRESSURE: 54 MMHG | WEIGHT: 175 LBS | SYSTOLIC BLOOD PRESSURE: 98 MMHG | BODY MASS INDEX: 25 KG/M2

## 2024-12-31 DIAGNOSIS — S01.01XA LACERATION OF SCALP, INITIAL ENCOUNTER: ICD-10-CM
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
## 2024-12-31 DIAGNOSIS — S09.90XA INJURY OF HEAD, INITIAL ENCOUNTER: ICD-10-CM

## 2024-12-31 LAB
ANION GAP SERPL CALC-SCNC: 7 MMOL/L (ref 0–18)
BASOPHILS # BLD AUTO: 0.09 X10(3) UL (ref 0–0.2)
BASOPHILS NFR BLD AUTO: 1.1 %
BUN BLD-MCNC: 18 MG/DL (ref 9–23)
BUN/CREAT SERPL: 18.8 (ref 10–20)
CALCIUM BLD-MCNC: 8.9 MG/DL (ref 8.7–10.4)
CHLORIDE SERPL-SCNC: 107 MMOL/L (ref 98–112)
CO2 SERPL-SCNC: 25 MMOL/L (ref 21–32)
CREAT BLD-MCNC: 0.96 MG/DL
DEPRECATED RDW RBC AUTO: 90 FL (ref 35.1–46.3)
EGFRCR SERPLBLD CKD-EPI 2021: 74 ML/MIN/1.73M2 (ref 60–?)
EOSINOPHIL # BLD AUTO: 0.23 X10(3) UL (ref 0–0.7)
EOSINOPHIL NFR BLD AUTO: 2.7 %
ERYTHROCYTE [DISTWIDTH] IN BLOOD BY AUTOMATED COUNT: 23.5 % (ref 11–15)
GLUCOSE BLD-MCNC: 101 MG/DL (ref 70–99)
HCT VFR BLD AUTO: 29.3 %
HGB BLD-MCNC: 9.3 G/DL
IMM GRANULOCYTES # BLD AUTO: 0.39 X10(3) UL (ref 0–1)
IMM GRANULOCYTES NFR BLD: 4.6 %
LYMPHOCYTES # BLD AUTO: 0.84 X10(3) UL (ref 1–4)
LYMPHOCYTES NFR BLD AUTO: 9.8 %
MCH RBC QN AUTO: 32.3 PG (ref 26–34)
MCHC RBC AUTO-ENTMCNC: 31.7 G/DL (ref 31–37)
MCV RBC AUTO: 101.7 FL
MONOCYTES # BLD AUTO: 1.45 X10(3) UL (ref 0.1–1)
MONOCYTES NFR BLD AUTO: 17 %
NEUTROPHILS # BLD AUTO: 5.53 X10 (3) UL (ref 1.5–7.7)
NEUTROPHILS # BLD AUTO: 5.53 X10(3) UL (ref 1.5–7.7)
NEUTROPHILS NFR BLD AUTO: 64.8 %
OSMOLALITY SERPL CALC.SUM OF ELEC: 290 MOSM/KG (ref 275–295)
PLATELET # BLD AUTO: 358 10(3)UL (ref 150–450)
POTASSIUM SERPL-SCNC: 4.5 MMOL/L (ref 3.5–5.1)
Q-T INTERVAL: 456 MS
QRS DURATION: 144 MS
QTC CALCULATION (BEZET): 474 MS
R AXIS: -65 DEGREES
RBC # BLD AUTO: 2.88 X10(6)UL
SODIUM SERPL-SCNC: 139 MMOL/L (ref 136–145)
T AXIS: 60 DEGREES
VENTRICULAR RATE: 65 BPM
WBC # BLD AUTO: 8.5 X10(3) UL (ref 4–11)

## 2024-12-31 PROCEDURE — 12002 RPR S/N/AX/GEN/TRNK2.6-7.5CM: CPT

## 2024-12-31 PROCEDURE — 80048 BASIC METABOLIC PNL TOTAL CA: CPT | Performed by: EMERGENCY MEDICINE

## 2024-12-31 PROCEDURE — 36415 COLL VENOUS BLD VENIPUNCTURE: CPT

## 2024-12-31 PROCEDURE — 93005 ELECTROCARDIOGRAM TRACING: CPT

## 2024-12-31 PROCEDURE — 74018 RADEX ABDOMEN 1 VIEW: CPT | Performed by: EMERGENCY MEDICINE

## 2024-12-31 PROCEDURE — 93010 ELECTROCARDIOGRAM REPORT: CPT

## 2024-12-31 PROCEDURE — 99285 EMERGENCY DEPT VISIT HI MDM: CPT

## 2024-12-31 PROCEDURE — 85025 COMPLETE CBC W/AUTO DIFF WBC: CPT | Performed by: EMERGENCY MEDICINE

## 2024-12-31 PROCEDURE — 70450 CT HEAD/BRAIN W/O DYE: CPT | Performed by: EMERGENCY MEDICINE

## 2024-12-31 PROCEDURE — 72125 CT NECK SPINE W/O DYE: CPT | Performed by: EMERGENCY MEDICINE

## 2024-12-31 RX ORDER — LIDOCAINE HCL/EPINEPHRINE/PF 2%-1:200K
20 VIAL (ML) INJECTION ONCE
Status: COMPLETED | OUTPATIENT
Start: 2024-12-31 | End: 2024-12-31

## 2024-12-31 NOTE — ED PROVIDER NOTES
Patient Seen in: [facility] Emergency Department      History     Chief Complaint   Patient presents with    Fall     Stated Complaint: unwitnessed fall, 4in lac to scalp, no bloodthinners    Subjective:   HPI      Patient presents the emergency department after unwitnessed fall.  The nursing home staff found him sitting in a chair.  And they noticed a laceration of the back of his head.  Otherwise patient had no complaints.  He recently was admitted there after a hospitalization for hematuria.    Objective:     Past Medical History:    Hypothyroidism              History reviewed. No pertinent surgical history.             Social History     Socioeconomic History    Marital status:    Tobacco Use    Smoking status: Unknown     Social Drivers of Health     Food Insecurity: No Food Insecurity (12/22/2024)    Food Insecurity     Food Insecurity: Never true   Transportation Needs: No Transportation Needs (12/22/2024)    Transportation Needs     Lack of Transportation: No   Housing Stability: Low Risk  (12/22/2024)    Housing Stability     Housing Instability: No                  Physical Exam     ED Triage Vitals [12/31/24 0836]   /75   Pulse 88   Resp 16   Temp 97.5 °F (36.4 °C)   Temp src Temporal   SpO2 100 %   O2 Device None (Room air)       Current Vitals:   No data recorded      Physical Exam  Vitals and nursing note reviewed.   Constitutional:       General: He is not in acute distress.     Appearance: He is well-developed.   HENT:      Head: Normocephalic.      Nose: Nose normal.      Mouth/Throat:      Mouth: Mucous membranes are moist.   Eyes:      Conjunctiva/sclera: Conjunctivae normal.   Cardiovascular:      Rate and Rhythm: Normal rate and regular rhythm.      Heart sounds: No murmur heard.  Pulmonary:      Effort: Pulmonary effort is normal. No respiratory distress.      Breath sounds: Normal breath sounds.   Abdominal:      General: There is no distension.      Palpations: Abdomen  is soft.      Tenderness: There is no abdominal tenderness.   Musculoskeletal:         General: No tenderness. Normal range of motion.      Cervical back: Normal range of motion and neck supple.   Skin:     Capillary Refill: Capillary refill takes less than 2 seconds.      Comments: There is a 4 cm mildly gaping nonbleeding laceration on the posterior occipital region of the scalp.   Neurological:      General: No focal deficit present.      Mental Status: He is alert.      Cranial Nerves: No cranial nerve deficit.      Sensory: No sensory deficit.             ED Course     Labs Reviewed   CBC WITH DIFFERENTIAL WITH PLATELET - Abnormal; Notable for the following components:       Result Value    RBC 2.88 (*)     HGB 9.3 (*)     HCT 29.3 (*)     .7 (*)     RDW-SD 90.0 (*)     RDW 23.5 (*)     Lymphocyte Absolute 0.84 (*)     Monocyte Absolute 1.45 (*)     All other components within normal limits   BASIC METABOLIC PANEL (8) - Abnormal; Notable for the following components:    Glucose 101 (*)     All other components within normal limits     EKG    Rate, intervals and axes as noted on EKG Report.  Rate: 65 bpm  Rhythm: Atrial Fibrillation  Reading: Abnormal, unchanged from old EKG                       MDM              Medical Decision Making  Differential diagnosis considered for fracture, contusion, subdural hematoma, closed head injury, laceration.    Problems Addressed:  Fall, initial encounter: acute illness or injury  Injury of head, initial encounter: acute illness or injury  Laceration of scalp, initial encounter: acute illness or injury    Amount and/or Complexity of Data Reviewed  Labs: ordered. Decision-making details documented in ED Course.     Details: Kidney function has returned to normal when compared to discharge renal function.  Hemoglobin increased to 9.4 from 7.6.  Radiology: ordered and independent interpretation performed. Decision-making details documented in ED Course.     Details: CT  scan of the brain shows no fracture or intracranial hemorrhage.  CT cervical spine shows no fracture.  ECG/medicine tests: ordered and independent interpretation performed. Decision-making details documented in ED Course.  Discussion of management or test interpretation with external provider(s): Patient's blood pressures have been in the mid 90s to 115 systolic which correlates with blood pressures during his recent hospital stay as well as the blood pressure upon arrival to skilled nursing facility.  Plain x-ray of the abdomen shows mild stool burden.  Family was concerned about constipation.  Recommend continuing MiraLAX.    Procedure:  Laceration repair:  Verbal consent was obtained from the patient. Sterile technique. The 5 cm laceration located scalp was anesthetized in the usual fashion. The wound was scrubbed, draped and explored.   There were no deep structures involved.  No tendon injury was identified.  The wound was repaired with staples .  The wound repair was simple.  The procedure was performed by myself.      Risk  Prescription drug management.        Disposition and Plan     Clinical Impression:  1. Fall, initial encounter    2. Injury of head, initial encounter    3. Laceration of scalp, initial encounter         Disposition:  Discharge  12/31/2024 11:57 am    Follow-up:  Macho Valenzuela MD  [address]  [place] [address]  [phone]    Schedule an appointment as soon as possible for a visit      We recommend that you schedule follow up care with a primary care provider within the next three months to obtain basic health screening including reassessment of your blood pressure.      Medications Prescribed:  Discharge Medication List as of 12/31/2024 12:26 PM              Supplementary Documentation:

## 2024-12-31 NOTE — ED NOTES
Wound repaired per lizzy with staples    Pt sleeping comfortably with no apparent distress    Report given to nursing home nurse Ap. Notified of pt's care and results    stable

## 2024-12-31 NOTE — ED NOTES
Pt back to NH via [facility]   Stable upon leaving ed with family and belongings    Piv dc'd  Lac wrapped with curlex

## 2024-12-31 NOTE — ED INITIAL ASSESSMENT (HPI)
Patient arrives to ER for evaluation of an unwitnessed fall. Patient was sitting in a chair. Head laceration noted.       Hx of subdural.

## 2024-12-31 NOTE — CM/SW NOTE
Met with son and daughter at [facility]    Spoke about discharge plans including returning to [facility], returning home with home health care and 24 hour care giver, potentially starting hospice care at some point if needed, looking for a different SNF, hiring a 24 hour caregiver to stay at the SNF.    Son is in contact with different care giver companies and has some meeting coming up to interview the caregivers    Son and daughter agreed for patient to return to [facility] notified, ok for patient to return    Jo Ann CORDOVA, CCM, MSN    Emergency Room  [facility]  Clinical Transitions Leader  [phone]

## 2024-12-31 NOTE — ED NOTES
Pt to and from ct without incident    20 G PIV established to R FA. Flushes well, no s/s of infiltration noted. Labs sent for processing.     Family now at     EKG in progress  Will monitor

## 2024-12-31 NOTE — CM/SW NOTE
[facility] in [place], [place]  Address: [address], [place] [address]  Phone: [phone]    [facility] ETA 12:30     PCS completed    Jo Ann CORDOVA, CCM, MSN    Emergency Room  [facility]  Clinical Transitions Leader  [phone]

## 2025-02-02 ENCOUNTER — HOSPITAL ENCOUNTER (OUTPATIENT)
Age: OVER 89
Discharge: HOME | End: 2025-02-02
Payer: MEDICARE

## 2025-02-02 VITALS
OXYGEN SATURATION: 99 % | DIASTOLIC BLOOD PRESSURE: 87 MMHG | HEART RATE: 91 BPM | RESPIRATION RATE: 18 BRPM | SYSTOLIC BLOOD PRESSURE: 150 MMHG | TEMPERATURE: 98 F

## 2025-02-02 DIAGNOSIS — N30.01 ACUTE CYSTITIS WITH HEMATURIA: Primary | ICD-10-CM

## 2025-02-02 LAB
BILIRUB UR QL STRIP: NEGATIVE
CLARITY UR: CLEAR
GLUCOSE UR STRIP-MCNC: NEGATIVE MG/DL
KETONES UR STRIP-MCNC: NEGATIVE MG/DL
NITRITE UR QL STRIP: NEGATIVE
PH UR STRIP: 6.5 [PH]
PROT UR STRIP-MCNC: 30 MG/DL
SP GR UR STRIP: 1.01
UROBILINOGEN UR STRIP-ACNC: <2 MG/DL

## 2025-02-02 PROCEDURE — 87077 CULTURE AEROBIC IDENTIFY: CPT | Performed by: PHYSICIAN ASSISTANT

## 2025-02-02 PROCEDURE — 81002 URINALYSIS NONAUTO W/O SCOPE: CPT

## 2025-02-02 PROCEDURE — 99214 OFFICE O/P EST MOD 30 MIN: CPT

## 2025-02-02 PROCEDURE — 87186 SC STD MICRODIL/AGAR DIL: CPT | Performed by: PHYSICIAN ASSISTANT

## 2025-02-02 PROCEDURE — 87086 URINE CULTURE/COLONY COUNT: CPT | Performed by: PHYSICIAN ASSISTANT

## 2025-02-02 RX ORDER — CEPHALEXIN 500 MG/1
500 CAPSULE ORAL 2 TIMES DAILY
Qty: 20 CAPSULE | Refills: 0 | Status: SHIPPED | OUTPATIENT
Start: 2025-02-02 | End: 2025-02-12

## 2025-02-02 NOTE — ED PROVIDER NOTES
Patient Seen in: [facility] [place]      History     Chief Complaint   Patient presents with    Urinary Symptoms     Possible uti - Entered by patient     Stated Complaint: Urinary Symptoms - Possible uti    Subjective:   HPI    89-year-old male with history of hereditary hemochromatosis presenting with family with concern for urinary tract infection. Pt here with daughter who provides the history:  Patient has a urinary catheter.  This morning when the home health nurse arrived there was no urine in the bag.  She irrigated it and noted a lot of sediment, some blood and cloudiness.  Patient has no other complaints.  Since it was irrigated and has been draining per normal.  Denies fevers, chills.     Objective:     Past Medical History:    Hypothyroidism              History reviewed. No pertinent surgical history.             Social History     Socioeconomic History    Marital status:    Tobacco Use    Smoking status: Unknown   Vaping Use    Vaping status: Never Used   Substance and Sexual Activity    Alcohol use: Not Currently    Drug use: Not Currently     Social Drivers of Health     Food Insecurity: No Food Insecurity (12/22/2024)    Food Insecurity     Food Insecurity: Never true   Transportation Needs: No Transportation Needs (12/22/2024)    Transportation Needs     Lack of Transportation: No   Housing Stability: Low Risk  (12/22/2024)    Housing Stability     Housing Instability: No              Review of Systems    Positive for stated complaint: Urinary Symptoms - Possible uti  Other systems are as noted in HPI.  Constitutional and vital signs reviewed.      All other systems reviewed and negative except as noted above.    Physical Exam     ED Triage Vitals [02/02/25 1340]   /87   Pulse 91   Resp 18   Temp 98 °F (36.7 °C)   Temp src Oral   SpO2 99 %   O2 Device None (Room air)       Current Vitals:   Vital Signs  BP: 150/87  Pulse: 91  Resp: 18  Temp: 98 °F (36.7 °C)  Temp src:  Oral    Oxygen Therapy  SpO2: 99 %  O2 Device: None (Room air)        Physical Exam  Vitals and nursing note reviewed.   Constitutional:       General: He is not in acute distress.  HENT:      Head: Normocephalic and atraumatic.      Right Ear: External ear normal.      Left Ear: External ear normal.      Nose: Nose normal.      Mouth/Throat:      Mouth: Mucous membranes are moist.   Eyes:      Extraocular Movements: Extraocular movements intact.      Pupils: Pupils are equal, round, and reactive to light.   Cardiovascular:      Rate and Rhythm: Normal rate.   Pulmonary:      Effort: Pulmonary effort is normal.   Abdominal:      General: Abdomen is flat.   Musculoskeletal:         General: Normal range of motion.      Cervical back: Normal range of motion.   Skin:     General: Skin is warm.   Neurological:      General: No focal deficit present.      Mental Status: He is alert and oriented to person, place, and time.   Psychiatric:         Mood and Affect: Mood normal.         Behavior: Behavior normal.             ED Course     Labs Reviewed   [facility] POCT URINALYSIS DIPSTICK - Abnormal; Notable for the following components:       Result Value    Protein urine 30 (*)     Blood, Urine Large (*)     Leukocyte esterase urine Small (*)     All other components within normal limits   URINE CULTURE, ROUTINE        89-year-old male here for possible urinary tract infection.  Patient has a lot of dark appearing urine in his [facility] bag at this time with sediment and clots noted.  IC nurse clamped the catheter prior to sending off a urine culture on fresh specimen.  UA with large blood and small leukocyte.  Will treat with Keflex.    Ddx-UTI, pyelonephritis, hemorrhagic cystitis    Urine catheter draining well at this time.  Patient has no complaints.  Daughter is comfortable with this plan           MDM              Medical Decision Making      Disposition and Plan     Clinical Impression:  1. Acute cystitis with hematuria          Disposition:  Discharge  2/2/2025  2:40 pm    Follow-up:  Garrick Smalls MD  [address]  [address]  [place] [id number] [phone]                Medications Prescribed:  Discharge Medication List as of 2/2/2025  2:42 PM        START taking these medications    Details   cephALEXin 500 MG Oral Cap Take 1 capsule (500 mg total) by mouth 2 (two) times daily for 10 days., Normal, Disp-20 capsule, R-0                 Supplementary Documentation:

## 2025-02-02 NOTE — ED INITIAL ASSESSMENT (HPI)
H/o bladder cancer, pt with indwelling umaña catheter , per daughter umaña set up was changed 1 1/2 weeks ago, noticed to have cloudy urine with sediments and blood this am , pt lives in an assisted living facility, no documented fever, pt denies penile pain

## 2025-02-02 NOTE — ED QUICK NOTES
Pt came in with cloudy, brownish output from the umaña catheter, penile area no redness, pt denies penile pain, total urine output 800 ml

## 2025-05-04 ENCOUNTER — HOSPITAL ENCOUNTER (OUTPATIENT)
Age: OVER 89
Discharge: HOME | End: 2025-05-04
Attending: EMERGENCY MEDICINE
Payer: MEDICARE

## 2025-05-04 VITALS
DIASTOLIC BLOOD PRESSURE: 79 MMHG | OXYGEN SATURATION: 100 % | TEMPERATURE: 97 F | SYSTOLIC BLOOD PRESSURE: 153 MMHG | RESPIRATION RATE: 18 BRPM | HEART RATE: 103 BPM

## 2025-05-04 DIAGNOSIS — N39.0 RECURRENT UTI (URINARY TRACT INFECTION): Primary | ICD-10-CM

## 2025-05-04 LAB
BILIRUB UR QL STRIP: NEGATIVE
COLOR UR: YELLOW
GLUCOSE UR STRIP-MCNC: NEGATIVE MG/DL
KETONES UR STRIP-MCNC: NEGATIVE MG/DL
NITRITE UR QL STRIP: POSITIVE
PH UR STRIP: 6 [PH]
PROT UR STRIP-MCNC: >=300 MG/DL
SP GR UR STRIP: 1.02
UROBILINOGEN UR STRIP-ACNC: <2 MG/DL

## 2025-05-04 PROCEDURE — 87086 URINE CULTURE/COLONY COUNT: CPT | Performed by: EMERGENCY MEDICINE

## 2025-05-04 PROCEDURE — 87077 CULTURE AEROBIC IDENTIFY: CPT | Performed by: EMERGENCY MEDICINE

## 2025-05-04 PROCEDURE — 99213 OFFICE O/P EST LOW 20 MIN: CPT

## 2025-05-04 PROCEDURE — 99214 OFFICE O/P EST MOD 30 MIN: CPT

## 2025-05-04 PROCEDURE — 87186 SC STD MICRODIL/AGAR DIL: CPT | Performed by: EMERGENCY MEDICINE

## 2025-05-04 PROCEDURE — 81002 URINALYSIS NONAUTO W/O SCOPE: CPT

## 2025-05-04 RX ORDER — CIPROFLOXACIN 500 MG/1
500 TABLET, FILM COATED ORAL 2 TIMES DAILY
Qty: 14 TABLET | Refills: 0 | Status: SHIPPED | OUTPATIENT
Start: 2025-05-04 | End: 2025-05-04 | Stop reason: RX

## 2025-05-04 RX ORDER — CIPROFLOXACIN 500 MG/1
500 TABLET, FILM COATED ORAL 2 TIMES DAILY
Qty: 14 TABLET | Refills: 0 | Status: SHIPPED | OUTPATIENT
Start: 2025-05-04 | End: 2025-05-11

## 2025-05-04 NOTE — ED INITIAL ASSESSMENT (HPI)
Patient with hx of indwelling umaña.  With recent uti.  Finished course of bactrim on Thursday.  Still with urinary pressure.  Denies fevers.

## 2025-05-04 NOTE — ED PROVIDER NOTES
Patient Seen in: [facility] [place]      History     Chief Complaint   Patient presents with    Urinary Symptoms     Stated Complaint: urinary issues    Subjective:   HPI    This is a 89-year-old male presents to immediate care with complaint of urinary bladder pressure.  Patient was Russi diagnosed and treated for urinary tract infection.  Patient completed course 3 days ago.  Patient was on antibiotics for 5 days.  He denies fever, chills, nausea, vomiting or back pain.  Over the last 24 to 36 hours patient has noticed mild pressure with suprapubic region.  There are no known aggravating or alleviating factors.  History of Present Illness               Objective:     Past Medical History:    Hypothyroidism              History reviewed. No pertinent surgical history.             No pertinent social history.            Review of Systems   Constitutional: Negative.    Respiratory: Negative.     Cardiovascular: Negative.    Gastrointestinal:  Positive for abdominal pain.   Skin: Negative.    Neurological: Negative.    All other systems reviewed and are negative.      Positive for stated complaint: urinary issues  Other systems are as noted in HPI.  Constitutional and vital signs reviewed.      All other systems reviewed and negative except as noted above.                  Physical Exam     ED Triage Vitals [05/04/25 1341]   /79   Pulse 103   Resp 18   Temp 97 °F (36.1 °C)   Temp src Oral   SpO2 100 %   O2 Device None (Room air)       Current Vitals:   Vital Signs  BP: 153/79  Pulse: 103  Resp: 18  Temp: 97 °F (36.1 °C)  Temp src: Oral    Oxygen Therapy  SpO2: 100 %  O2 Device: None (Room air)        Physical Exam  Constitutional:       General: He is not in acute distress.     Appearance: Normal appearance. He is normal weight. He is not ill-appearing, toxic-appearing or diaphoretic.   HENT:      Head: Normocephalic and atraumatic.      Mouth/Throat:      Mouth: Mucous membranes are moist.    Cardiovascular:      Rate and Rhythm: Normal rate and regular rhythm.      Pulses: Normal pulses.      Heart sounds: Normal heart sounds.   Pulmonary:      Effort: Pulmonary effort is normal.      Breath sounds: Normal breath sounds.   Abdominal:      General: Abdomen is flat.      Palpations: Abdomen is soft.   Musculoskeletal:         General: Normal range of motion.      Cervical back: Normal range of motion.   Skin:     General: Skin is warm.   Neurological:      General: No focal deficit present.      Mental Status: He is alert and oriented to person, place, and time. Mental status is at baseline.   Psychiatric:         Mood and Affect: Mood normal.         Behavior: Behavior normal.         Thought Content: Thought content normal.         Judgment: Judgment normal.           Physical Exam                ED Course     Labs Reviewed   [facility] POCT URINALYSIS DIPSTICK - Abnormal; Notable for the following components:       Result Value    Urine Clarity Cloudy (*)     Protein urine >=300 (*)     Blood, Urine Large (*)     Nitrite Urine Positive (*)     Leukocyte esterase urine Small (*)     All other components within normal limits   URINE CULTURE, ROUTINE          Results                           MDM              Medical Decision Making  Medical decision making  Multiple diagnoses considered in the evaluation of this patient.  Vital signs reviewed and are stable. Differential diagnosis considered include, but not limited to: UTI        Diagnosis: UTI      Treatment Plan: With recent antibiotic treatment will treat with ciprofloxacin, urine culture      Amount and/or Complexity of Data Reviewed  Labs: ordered.        Disposition and Plan     Clinical Impression:  1. Recurrent UTI (urinary tract infection)         Disposition:  Discharge  5/4/2025  2:23 pm    Follow-up:  Garrick Smalls MD  [facility]  [address] [place] [id number]  [phone]    In 1 week  As needed          Medications Prescribed:  Current  Discharge Medication List        START taking these medications    Details   ciprofloxacin 500 MG Oral Tab Take 1 tablet (500 mg total) by mouth 2 (two) times daily for 7 days.  Qty: 14 tablet, Refills: 0             Supplementary Documentation:

## 2025-05-07 NOTE — PROGRESS NOTES
Patient's daughter Elysia Vargas called back, she confirms her name as well as the patient's name and date of birth and she confirms her relation and that she is the medical point of contact for the patient.  We discussed that patient's urine grew Pseudomonas aeruginosa bacteria to which there is only intermediate sensitivity to fluoroquinolones which is what he was prescribed on assessment on 5/4/2025.  We did discuss potential for colonization given patient reportedly has chronic indwelling Umaña catheter, and sample was drawn from proximal aspect of umaña per the daughter, but I explained that I am concerned that given the patient was symptomatic he may require IV antibiotics given fluoroquinolones are the only oral antibiotics that are reliably active against Pseudomonas aeruginosa.  There is sensitivity to 3rd and 4th generation IV cephalosporins, but per review on up-to-date, cefpodoxime, oral third-generation antibiotic has poor activity again Pseudomonas aeruginosa, and therefore would not prescribe cefpodoxime.    I offered to call the patient and have a three-way call with him and the daughter to discuss his clinical course, as with concern for any ongoing symptoms he should go straight to the emergency department, given the above.  Patient's daughter declines, she states her parents are in assisted living and she is concerned for reliability, she prefers to call them and call me back in clinic at the immediate care today.    Per chart review on 2/2/2025 patient also grew Pseudomonas aeruginosa, this was pansensitive at the time, I discussed that I am concerned that he is developing resistance and should be following with an infectious disease specialist, and this was discussed with his daughter.    She will call me back after discussing with her father to discuss potential ED assessment for IV antibiotics.  Currently, awaiting return call.    Elvira Huang DO

## 2025-05-08 NOTE — PROGRESS NOTES
Patient's daughter called back, confirmed patient's name and date of birth, she was able to speak with her mother and with her father, reports patient's symptoms had resolved, but 20 minutes ago he did have a one time urge to urinate, but she is unsure if this is related as it is a vague symptom.  We discussed that with any symptoms I would recommend immediate assessment in the emergency department given concern for resistance on urine culture to oral antibiotics.  However, if asymptomatic, would recommend very close follow-up by urology and completion of antibiotics as he may be colonized, but if any recurrent, new, changing, or progressing signs or symptoms develop, he should be immediately reassessed in the emergency department and very strict ED precautions were discussed.    Patient's daughter is agreeable with this plan.  She is unsure if she prefers to follow-up with duly urology, and therefore patient's discharge summary was updated with [place] u[facility] information as requested by his daughter.    No further intervention indicated at this time.    Elvira Huang, DO

## 2025-05-09 ENCOUNTER — HOSPITAL ENCOUNTER (INPATIENT)
Facility: HOSPITAL | Age: OVER 89
LOS: 2 days | Discharge: HOME HEALTH | End: 2025-05-11
Attending: EMERGENCY MEDICINE | Admitting: HOSPITALIST
Payer: MEDICARE

## 2025-05-09 DIAGNOSIS — A49.8 PSEUDOMONAS AERUGINOSA INFECTION: ICD-10-CM

## 2025-05-09 DIAGNOSIS — N39.0 URINARY TRACT INFECTION ASSOCIATED WITH INDWELLING URETHRAL CATHETER, SUBSEQUENT ENCOUNTER: Primary | ICD-10-CM

## 2025-05-09 DIAGNOSIS — T83.511D URINARY TRACT INFECTION ASSOCIATED WITH INDWELLING URETHRAL CATHETER, SUBSEQUENT ENCOUNTER: Primary | ICD-10-CM

## 2025-05-09 LAB
ANION GAP SERPL CALC-SCNC: 9 MMOL/L (ref 0–18)
BASOPHILS # BLD AUTO: 0.09 X10(3) UL (ref 0–0.2)
BASOPHILS NFR BLD AUTO: 1.5 %
BILIRUB UR QL: NEGATIVE
BUN BLD-MCNC: 25 MG/DL (ref 9–23)
BUN/CREAT SERPL: 18.2 (ref 10–20)
CALCIUM BLD-MCNC: 9 MG/DL (ref 8.7–10.4)
CHLORIDE SERPL-SCNC: 104 MMOL/L (ref 98–112)
CLARITY UR: CLEAR
CO2 SERPL-SCNC: 24 MMOL/L (ref 21–32)
CREAT BLD-MCNC: 1.37 MG/DL (ref 0.7–1.3)
DEPRECATED RDW RBC AUTO: 90 FL (ref 35.1–46.3)
EGFRCR SERPLBLD CKD-EPI 2021: 48 ML/MIN/1.73M2 (ref 60–?)
EOSINOPHIL # BLD AUTO: 0.18 X10(3) UL (ref 0–0.7)
EOSINOPHIL NFR BLD AUTO: 3 %
ERYTHROCYTE [DISTWIDTH] IN BLOOD BY AUTOMATED COUNT: 24.5 % (ref 11–15)
GLUCOSE BLD-MCNC: 110 MG/DL (ref 70–99)
GLUCOSE UR-MCNC: NORMAL MG/DL
HCT VFR BLD AUTO: 26.6 % (ref 39–53)
HGB BLD-MCNC: 8.7 G/DL (ref 13–17.5)
IMM GRANULOCYTES # BLD AUTO: 0.06 X10(3) UL (ref 0–1)
IMM GRANULOCYTES NFR BLD: 1 %
KETONES UR-MCNC: NEGATIVE MG/DL
LACTATE SERPL-SCNC: 2 MMOL/L (ref 0.5–2)
LEUKOCYTE ESTERASE UR QL STRIP.AUTO: 250
LYMPHOCYTES # BLD AUTO: 1.59 X10(3) UL (ref 1–4)
LYMPHOCYTES NFR BLD AUTO: 26.2 %
MCH RBC QN AUTO: 32.6 PG (ref 26–34)
MCHC RBC AUTO-ENTMCNC: 32.7 G/DL (ref 31–37)
MCV RBC AUTO: 99.6 FL (ref 80–100)
MONOCYTES # BLD AUTO: 1.19 X10(3) UL (ref 0.1–1)
MONOCYTES NFR BLD AUTO: 19.6 %
NEUTROPHILS # BLD AUTO: 2.96 X10 (3) UL (ref 1.5–7.7)
NEUTROPHILS # BLD AUTO: 2.96 X10(3) UL (ref 1.5–7.7)
NEUTROPHILS NFR BLD AUTO: 48.7 %
NITRITE UR QL STRIP.AUTO: NEGATIVE
OSMOLALITY SERPL CALC.SUM OF ELEC: 289 MOSM/KG (ref 275–295)
PH UR: 5.5 [PH] (ref 5–8)
PLATELET # BLD AUTO: 381 10(3)UL (ref 150–450)
PLATELET MORPHOLOGY: NORMAL
POTASSIUM SERPL-SCNC: 4.2 MMOL/L (ref 3.5–5.1)
RBC # BLD AUTO: 2.67 X10(6)UL (ref 3.8–5.8)
RBC #/AREA URNS AUTO: >10 /HPF
SODIUM SERPL-SCNC: 137 MMOL/L (ref 136–145)
SP GR UR STRIP: 1.01 (ref 1–1.03)
UROBILINOGEN UR STRIP-ACNC: NORMAL
WBC # BLD AUTO: 6.1 X10(3) UL (ref 4–11)
WBC #/AREA URNS AUTO: >50 /HPF

## 2025-05-09 PROCEDURE — 99223 1ST HOSP IP/OBS HIGH 75: CPT | Performed by: HOSPITALIST

## 2025-05-09 RX ORDER — VANCOMYCIN HYDROCHLORIDE 125 MG/1
125 CAPSULE ORAL DAILY
Status: DISCONTINUED | OUTPATIENT
Start: 2025-05-10 | End: 2025-05-11

## 2025-05-09 RX ORDER — ONDANSETRON 2 MG/ML
4 INJECTION INTRAMUSCULAR; INTRAVENOUS EVERY 6 HOURS PRN
Status: DISCONTINUED | OUTPATIENT
Start: 2025-05-09 | End: 2025-05-11

## 2025-05-09 RX ORDER — SODIUM CHLORIDE 9 MG/ML
INJECTION, SOLUTION INTRAVENOUS CONTINUOUS
Status: DISCONTINUED | OUTPATIENT
Start: 2025-05-09 | End: 2025-05-11

## 2025-05-09 RX ORDER — TAMSULOSIN HYDROCHLORIDE 0.4 MG/1
0.4 CAPSULE ORAL DAILY
Status: DISCONTINUED | OUTPATIENT
Start: 2025-05-10 | End: 2025-05-11

## 2025-05-09 RX ORDER — MIRTAZAPINE 15 MG/1
15 TABLET, FILM COATED ORAL NIGHTLY
Status: DISCONTINUED | OUTPATIENT
Start: 2025-05-09 | End: 2025-05-11

## 2025-05-09 RX ORDER — LEVOTHYROXINE SODIUM 25 UG/1
25 TABLET ORAL
Status: DISCONTINUED | OUTPATIENT
Start: 2025-05-10 | End: 2025-05-11

## 2025-05-09 RX ORDER — MIRTAZAPINE 15 MG/1
15 TABLET, FILM COATED ORAL NIGHTLY
COMMUNITY
Start: 2025-05-05

## 2025-05-09 RX ORDER — ZOLPIDEM TARTRATE 5 MG/1
5 TABLET ORAL NIGHTLY PRN
Status: DISCONTINUED | OUTPATIENT
Start: 2025-05-09 | End: 2025-05-11

## 2025-05-09 RX ORDER — MAGNESIUM HYDROXIDE/ALUMINUM HYDROXICE/SIMETHICONE 120; 1200; 1200 MG/30ML; MG/30ML; MG/30ML
30 SUSPENSION ORAL 4 TIMES DAILY PRN
Status: DISCONTINUED | OUTPATIENT
Start: 2025-05-09 | End: 2025-05-11

## 2025-05-09 RX ORDER — HEPARIN SODIUM 5000 [USP'U]/ML
5000 INJECTION, SOLUTION INTRAVENOUS; SUBCUTANEOUS EVERY 12 HOURS
Status: DISCONTINUED | OUTPATIENT
Start: 2025-05-09 | End: 2025-05-11

## 2025-05-09 RX ORDER — PANTOPRAZOLE SODIUM 40 MG/1
40 TABLET, DELAYED RELEASE ORAL
Status: DISCONTINUED | OUTPATIENT
Start: 2025-05-10 | End: 2025-05-11

## 2025-05-09 RX ORDER — ACETAMINOPHEN 325 MG/1
650 TABLET ORAL EVERY 6 HOURS PRN
Status: DISCONTINUED | OUTPATIENT
Start: 2025-05-09 | End: 2025-05-11

## 2025-05-09 RX ORDER — DONEPEZIL HYDROCHLORIDE 5 MG/1
5 TABLET, FILM COATED ORAL NIGHTLY
Status: DISCONTINUED | OUTPATIENT
Start: 2025-05-09 | End: 2025-05-11

## 2025-05-10 LAB
ANION GAP SERPL CALC-SCNC: 9 MMOL/L (ref 0–18)
BASOPHILS # BLD AUTO: 0.09 X10(3) UL (ref 0–0.2)
BASOPHILS NFR BLD AUTO: 2 %
BUN BLD-MCNC: 23 MG/DL (ref 9–23)
BUN/CREAT SERPL: 19.8 (ref 10–20)
CALCIUM BLD-MCNC: 8.6 MG/DL (ref 8.7–10.4)
CHLORIDE SERPL-SCNC: 107 MMOL/L (ref 98–112)
CO2 SERPL-SCNC: 24 MMOL/L (ref 21–32)
CREAT BLD-MCNC: 1.16 MG/DL (ref 0.7–1.3)
DEPRECATED RDW RBC AUTO: 89.5 FL (ref 35.1–46.3)
EGFRCR SERPLBLD CKD-EPI 2021: 58 ML/MIN/1.73M2 (ref 60–?)
EOSINOPHIL # BLD AUTO: 0.14 X10(3) UL (ref 0–0.7)
EOSINOPHIL NFR BLD AUTO: 3.2 %
ERYTHROCYTE [DISTWIDTH] IN BLOOD BY AUTOMATED COUNT: 24.2 % (ref 11–15)
GLUCOSE BLD-MCNC: 101 MG/DL (ref 70–99)
HCT VFR BLD AUTO: 26 % (ref 39–53)
HGB BLD-MCNC: 8.4 G/DL (ref 13–17.5)
IMM GRANULOCYTES # BLD AUTO: 0.03 X10(3) UL (ref 0–1)
IMM GRANULOCYTES NFR BLD: 0.7 %
LYMPHOCYTES # BLD AUTO: 1.05 X10(3) UL (ref 1–4)
LYMPHOCYTES NFR BLD AUTO: 23.7 %
MCH RBC QN AUTO: 33.1 PG (ref 26–34)
MCHC RBC AUTO-ENTMCNC: 32.3 G/DL (ref 31–37)
MCV RBC AUTO: 102.4 FL (ref 80–100)
MONOCYTES # BLD AUTO: 0.9 X10(3) UL (ref 0.1–1)
MONOCYTES NFR BLD AUTO: 20.3 %
NEUTROPHILS # BLD AUTO: 2.22 X10 (3) UL (ref 1.5–7.7)
NEUTROPHILS # BLD AUTO: 2.22 X10(3) UL (ref 1.5–7.7)
NEUTROPHILS NFR BLD AUTO: 50.1 %
OSMOLALITY SERPL CALC.SUM OF ELEC: 294 MOSM/KG (ref 275–295)
PLATELET # BLD AUTO: 326 10(3)UL (ref 150–450)
POTASSIUM SERPL-SCNC: 4.2 MMOL/L (ref 3.5–5.1)
RBC # BLD AUTO: 2.54 X10(6)UL (ref 3.8–5.8)
SODIUM SERPL-SCNC: 140 MMOL/L (ref 136–145)
WBC # BLD AUTO: 4.4 X10(3) UL (ref 4–11)

## 2025-05-10 PROCEDURE — 99233 SBSQ HOSP IP/OBS HIGH 50: CPT | Performed by: INTERNAL MEDICINE

## 2025-05-10 NOTE — PHYSICAL THERAPY NOTE
PHYSICAL THERAPY EVALUATION - INPATIENT     Room Number: 540/540-A  Evaluation Date: 5/10/2025  Type of Evaluation: Initial        Presenting Problem: UTI  Co-Morbidities : hx cancer  Reason for Therapy: Mobility Dysfunction and Discharge Planning    PHYSICAL THERAPY ASSESSMENT   Patient is a 89 year old male admitted 2025 for UTI.  Prior to admission, patient's baseline is assist for ADLs, Ind with rollator.  Patient is currently functioning below baseline with bed mobility, transfers, and gait.  Patient is requiring minimal assist as a result of the following impairments: decreased functional strength, decreased endurance/aerobic capacity, impaired LE balance, and decreased muscular endurance.  Physical Therapy will continue to follow for duration of hospitalization.    Patient will benefit from continued skilled PT Services to promote return to prior level of function and safety with continuous assistance and gradual rehabilitative therapy .    PLAN DURING HOSPITALIZATION  Nursing Mobility Recommendation : 1 Assist  PT Device Recommendation: Rolling walker  PT Treatment Plan: Bed mobility, Endurance, Patient education, Gait training, Strengthening, Transfer training  Rehab Potential : Fair  Frequency (Obs): 3-5x/week     PHYSICAL THERAPY MEDICAL/SOCIAL HISTORY       Problem List  Principal Problem:    Urinary tract infection associated with indwelling urethral catheter, subsequent encounter  Active Problems:    Pseudomonas aeruginosa infection      HOME SITUATION  Type of Home: Assisted living facility                      Lives With: Spouse              Prior Level of [place]: independent with rollator; assist with ADLs        PHYSICAL THERAPY EXAMINATION   OBJECTIVE     Fall Risk: Standard fall risk    WEIGHT BEARING RESTRICTION       PAIN ASSESSMENT  Ratin          COGNITION  Alert to self  Follows commands  Decreased safety awareness; getting out of bed unaware of IV lines, catheter    RANGE OF  MOTION AND STRENGTH ASSESSMENT  Upper extremity ROM and strength are within functional limits   Lower extremity ROM is within functional limits   Lower extremity strength is within functional limits            AM-PAC '6-Clicks' INPATIENT SHORT FORM - BASIC MOBILITY  How much difficulty does the patient currently have...  Patient Difficulty: Turning over in bed (including adjusting bedclothes, sheets and blankets)?: A Little   Patient Difficulty: Sitting down on and standing up from a chair with arms (e.g., wheelchair, bedside commode, etc.): A Little   Patient Difficulty: Moving from lying on back to sitting on the side of the bed?: A Little   How much help from another person does the patient currently need...   Help from Another: Moving to and from a bed to a chair (including a wheelchair)?: A Little   Help from Another: Need to walk in hospital room?: A Little   Help from Another: Climbing 3-5 steps with a railing?: A Lot     AM-PAC Score:  Raw Score: 17   Approx Degree of Impairment: 50.57%   Standardized Score (AM-PAC Scale): 42.13   CMS Modifier (G-Code): CK    FUNCTIONAL ABILITY STATUS  Functional Mobility/Gait Assessment  Gait Assistance: Minimum assistance  Distance (ft): 100  Assistive Device: Rolling walker  Supine to Sit: contact guard assist  Sit to Stand: minimal assist    Exercise/Education Provided:  Bed mobility  Gait training  Transfer training    Skilled Therapy Provided: Patient was able to perform bed mobility with CGA and transfers and ambulation with min A with RW.  Patient displaying significant impaired posture when ambulating with excessive forward flexed trunk and neck requiring cues for attempts to stand upright.      The patient's Approx Degree of Impairment: 50.57% has been calculated based on documentation in the [facility] '6 clicks' Inpatient Basic Mobility Short Form.  Research supports that patients with this level of impairment may benefit from rehab upon discharge pending  progress.  Final disposition will be made by interdisciplinary medical team.    Patient End of Session: Up in chair, Call light within reach, Needs met, RN aware of session/findings, Family present    CURRENT GOALS  Goals to be met by: 2025  Patient Goal Patient's self-stated goal is: none stated   Goal #1 Patient is able to demonstrate supine - sit EOB @ level: modified independent     Goal #1   Current Status    Goal #2 Patient is able to demonstrate transfers Sit to/from Stand at assistance level: modified independent with walker - rolling     Goal #2  Current Status    Goal #3 Patient is able to ambulate >150 feet with assist device: walker - rolling at assistance level: modified independent   Goal #3   Current Status    Goal #4 Patient to demonstrate independence with home activity/exercise instructions provided to patient in preparation for discharge.   Goal #4  Current Status    Goal #6    Goal #6  Current Status      Patient Evaluation Complexity Level:  History High - 3 or more personal factors and/or co-morbidities   Examination of body systems Low -  addressing 1-2 elements   Clinical Presentation Low- Stable   Clinical Decision Making  Moderate Complexity     Gait Trainin minutes  Therapeutic Activity:  10 minutes

## 2025-05-10 NOTE — ED INITIAL ASSESSMENT (HPI)
Patient arrives to the ED accompanied by daughter, per daughter patient was seen at IC on Sunday and dx w/ a UTI. States this is his second round of abx. Received a call from IC today that cul was positive for Psuedomonas aeruginosa. Patient has chronic umaña in, last changed 3 weeks ago. Hx bladder CA

## 2025-05-10 NOTE — ED PROVIDER NOTES
[facility] Emergency Department Note  Patient: Jeff Lamb Age: 89 year old Sex: male      MRN: [id number]  : 3/14/1931    Patient Seen in: [facility] Emergency Department    History     Chief Complaint   Patient presents with    Urinary Symptoms     Stated Complaint: Urinary Symptoms UTI+    History obtained from: pt, daughter     This is a 89-year-old male history of prior bladder cancer status post dissection, A-fib not currently on Eliquis, history of urine retention with chronic indwelling Shields here due to abnormal urine culture sent in for IV antibiotics.  Patient states he has been having some urinary symptoms ongoing for the last couple of weeks.  They initially started some antibiotics in late April for UTI for UTI, presented to  on 2025 with suprapubic pressure and sensation of urgency. Pt discharged on Cipro which he's been taking without change in symptoms. Per daughter they got a call today that the urine culture was resistant and he needed IV antibiotics. Pt currently reports mild occasional suprapubic discomfort and urgency, but denies burning. No hematuria. No flank pain. No fever or chills. No vomiting or diarrhea, no CP or SOB.     Review of Systems:  Review of Systems  Positive for stated complaint: Urinary Symptoms UTI+. Constitutional and vital signs reviewed. All other systems reviewed and negative except as noted above.    Patient History:  Past Medical History[1]    Past Surgical History[2]     Family History[3]    Specific Social Determinants of Health:   Short Social Hx on File[4]        PSFH elements reviewed from today and agreed except as otherwise stated in HPI.    Physical Exam     ED Triage Vitals [25]   /78   Pulse 93   Resp 20   Temp 97.6 °F (36.4 °C)   Temp src Oral   SpO2 94 %   O2 Device None (Room air)       Current:/65   Pulse 80   Temp 97.6 °F (36.4 °C) (Oral)   Resp 19   SpO2 93%         Physical Exam  Vitals and nursing note  reviewed.   Constitutional:       General: He is not in acute distress.     Appearance: He is not ill-appearing.   HENT:      Head: Normocephalic and atraumatic.      Mouth/Throat:      Pharynx: Oropharynx is clear.   Eyes:      Conjunctiva/sclera: Conjunctivae normal.   Cardiovascular:      Rate and Rhythm: Normal rate and regular rhythm.      Heart sounds: No murmur heard.     Comments: 2+ radial and dp pulses bilaterally   Pulmonary:      Effort: Pulmonary effort is normal. No respiratory distress.      Breath sounds: No wheezing or rales.   Abdominal:      General: There is no distension.      Palpations: Abdomen is soft. There is no mass.      Tenderness: There is no abdominal tenderness. There is no right CVA tenderness, left CVA tenderness, guarding or rebound.   Genitourinary:     Comments: Shields in place, dark analy urine in leg bag   Musculoskeletal:         General: No deformity.   Skin:     General: Skin is warm and dry.      Capillary Refill: Capillary refill takes less than 2 seconds.   Neurological:      General: No focal deficit present.      Mental Status: He is alert.         ED Course   Labs:   Labs Reviewed   BASIC METABOLIC PANEL (8) - Abnormal; Notable for the following components:       Result Value    Glucose 110 (*)     BUN 25 (*)     Creatinine 1.37 (*)     eGFR-Cr 48 (*)     All other components within normal limits   CBC WITH DIFFERENTIAL WITH PLATELET - Abnormal; Notable for the following components:    RBC 2.67 (*)     HGB 8.7 (*)     HCT 26.6 (*)     RDW-SD 90.0 (*)     RDW 24.5 (*)     Monocyte Absolute 1.19 (*)     All other components within normal limits   URINALYSIS WITH CULTURE REFLEX - Abnormal; Notable for the following components:    Blood Urine 2+ (*)     Protein Urine Trace (*)     Leukocyte Esterase Urine 250 (*)     WBC Urine >50 (*)     RBC Urine >10 (*)     All other components within normal limits   LACTIC ACID, PLASMA - Normal   RBC MORPHOLOGY SCAN   BLOOD CULTURE    BLOOD CULTURE   URINE CULTURE, ROUTINE     Radiology findings:    No results found.    Cardiac Monitor: Interpreted by me.   Pulse Readings from Last 1 Encounters:   05/09/25 80   , sinus,     External non-ED records reviewed independently by me: reviewed urine culture sent on 5/4/2025 showing Pseudomonas uti    URINE CULTURE >100,000 CFU/ML Pseudomonas aeruginosa Abnormal         Resulting Agency: [facility] ([facility])     Susceptibility     Pseudomonas aeruginosa    Not Specified   Cefepime <=2 Sensitive   Ceftazidime 4 Sensitive   Ciprofloxacin 2 Intermediate   Levofloxacin 4 Intermediate   Meropenem <=1 Sensitive   Piperacillin + Tazobactam <=4 Sensitive   Tobramycin <=1 Sensitive                 MDM   This is a 89-year-old male history of prior bladder cancer status post dissection, A-fib not currently on Eliquis, history of urine retention with chronic indwelling Shields here due to abnormal urine culture sent in for IV antibiotics.    Reviewed Ucx as above with Pseudomonas aeruginosa growing from Ucx from 5/4/2025     Differential diagnoses considered includes, but is not limited to:   Catheter associated UTI from pseudomonas  Less likely sepsis, electrolyte abnormality or VERONICA     Will obtain the following tests: cbc, bmp, bcx, ua/ucx, lactic  Will administer the following medications/therapies: IV zosyn. Discussed with pharmacist Judith watson given CAUTI would be appropriate for zosyn     Please see ED course for my independent review of these tests/imaging results.    Chronic conditions affecting care: hx of bladder CA, afib     Workup and medications considered but not ordered: n/a    Social Determinants of Health that impacted care: n/a       ED Course as of 05/09/25 2144  ------------------------------------------------------------  Time: 05/09 2035  Comment: Case d/w Dr. Justice accepts admission   ------------------------------------------------------------  Time: 05/09 2144  Comment: UA c/w UTI with  microscopic hematuria. Lactic normal. Hb stable with chronic anemia. Mild elevation in Cr on BMP, will give IV fluids             Procedures:  Procedures        Disposition and Plan     Clinical Impression:  1. Urinary tract infection associated with indwelling urethral catheter, subsequent encounter    2. Pseudomonas aeruginosa infection        Disposition:  Admit        Hospital Problems       Present on Admission  Date Reviewed: 5/4/2025          ICD-10-CM Noted POA    * (Principal) Urinary tract infection associated with indwelling urethral catheter, subsequent encounter T83.511D, N39.0 5/9/2025 Unknown        This note may have been created using voice dictation technology and may include inadvertent errors.      Anika Segundo DO  Attending Physician   Emergency Medicine              [1]   Past Medical History:   A-fib (HCC)    Bladder cancer (HCC)    Hypothyroidism   [2] No past surgical history on file.  [3] No family history on file.  [4]   Social History  Socioeconomic History    Marital status:    Tobacco Use    Smoking status: Unknown   Vaping Use    Vaping status: Never Used   Substance and Sexual Activity    Alcohol use: Not Currently    Drug use: Not Currently     Social Drivers of Health     Food Insecurity: No Food Insecurity (12/22/2024)    Food Insecurity     Food Insecurity: Never true   Transportation Needs: No Transportation Needs (12/22/2024)    Transportation Needs     Lack of Transportation: No   Housing Stability: Low Risk  (12/22/2024)    Housing Stability     Housing Instability: No

## 2025-05-10 NOTE — PLAN OF CARE
Problem: Patient Centered Care  Goal: Patient preferences are identified and integrated in the patient's plan of care  Description: Interventions:- What would you like us to know as we care for you? From tanmay COPELAND- Provide timely, complete, and accurate information to patient/family- Incorporate patient and family knowledge, values, beliefs, and cultural backgrounds into the planning and delivery of care- Encourage patient/family to participate in care and decision-making at the level they choose- Honor patient and family perspectives and choices  Outcome: Progressing     Problem: RISK FOR INFECTION - ADULT  Goal: Absence of fever/infection during anticipated neutropenic period  Description: INTERVENTIONS- Monitor WBC- Administer growth factors as ordered- Implement neutropenic guidelines  Outcome: Progressing     Problem: SAFETY ADULT - FALL  Goal: Free from fall injury  Description: INTERVENTIONS:- Assess pt frequently for physical needs- Identify cognitive and physical deficits and behaviors that affect risk of falls.- [place] fall precautions as indicated by assessment.- Educate pt/family on patient safety including physical limitations- Instruct pt to call for assistance with activity based on assessment- Modify environment to reduce risk of injury- Provide assistive devices as appropriate- Consider OT/PT consult to assist with strengthening/mobility- Encourage toileting schedule  Outcome: Progressing

## 2025-05-10 NOTE — H&P
[facility]  part of [facility]    History & Physical    Jeff Lamb Patient Status:  Emergency    3/14/1931 MRN [id number]   Location [facility] EMERGENCY DEPARTMENT Attending Anika Segundo DO   Hosp Day # 0 PCP DEBRA DENNY MD     Date:  2025  Date of Admission:  2025    Chief Complaint:  Chief Complaint   Patient presents with    Urinary Symptoms       History of Present Illness:  Jeff Lamb is a(n) 89 year old male, With a past medical history significant for bladder cancer was sent to the ER after labs done outpatient indicated presence of Pseudomonas in urine.  Patient with chronic Shields after recurrent obstructions with history of bladder cancer has been experiencing suprapubic discomfort over the last few days, as a result had a UA done at his primary care's office which grew Pseudomonas.  He was earlier started on Keflex and then Cipro with some improvement in symptoms however there was no resolution.  Denies any fever or flank pain, has been noticing some hematuria.    History:  Past Medical History[1]  Past Surgical History[2]  Family History[3]   reports that he does not currently use alcohol. He reports that he does not currently use drugs.    Allergies:  Allergies[4]    Home Medications:  Prior to Admission Medications   Prescriptions Last Dose Informant Patient Reported? Taking?   Levothyroxine Sodium (SYNTHROID, LEVOTHROID) 25 MCG Oral Tab   Yes No   Sig: Take 1 tablet (25 mcg total) by mouth before breakfast.   cephALEXin 500 MG Oral Cap   No No   Sig: Take 1 capsule (500 mg total) by mouth 2 (two) times daily for 10 days.   ciprofloxacin 500 MG Oral Tab   No No   Sig: Take 1 tablet (500 mg total) by mouth 2 (two) times daily for 7 days.   donepezil 5 MG Oral Tab   Yes No   Sig: Take 1 tablet (5 mg total) by mouth nightly.   tamsulosin 0.4 MG Oral Cap   Yes No   Sig: Take 1 capsule (0.4 mg total) by mouth daily.      Facility-Administered Medications: None        Review of Systems:  Constitutional:  Weakness, Fatigue.  Eye:  Negative.  Ear/Nose/Mouth/Throat:  Negative.  Respiratory:  Negative  Cardiovascular: Negative  Gastrointestinal:  Negative.  Genitourinary: Suprapubic discomfort  Endocrine:  Negative.  Immunologic:  Negative.  Musculoskeletal:  Negative.  Integumentary:  Negative.  Neurologic:  Negative.  Psychiatric:  Negative.  ROS reviewed as documented in chart    Physical Exam:  Temp:  [97.6 °F (36.4 °C)] 97.6 °F (36.4 °C)  Pulse:  [80-93] 80  Resp:  [19-20] 19  BP: (111-136)/(65-78) 111/65  SpO2:  [93 %-94 %] 93 %    General:  Alert and oriented.  Diffuse skin problem:  None.  Eye:  Pupils are equal, round and reactive to light, extraocular movements are intact, Normal conjunctiva.  HENT:  Normocephalic, oral mucosa is moist.  Head:  Normocephalic, atraumatic.  Neck:  Supple, non-tender, no carotid bruit, no jugular venous distention, no lymphadenopathy, no thyromegaly.  Respiratory:  Lungs are clear to auscultation, respirations are non-labored, breath sounds are equal, symmetrical chest wall expansion.  Cardiovascular:  Normal rate, regular rhythm, no murmur, no edema.  Gastrointestinal:  Soft, non-tender, non-distended, normal bowel sounds, no organomegaly.  Lymphatics:  No lymphadenopathy neck, axilla, groin.  Musculoskeletal: Normal range of motion.  normal strength.  Feet:  Normal pulses.  Neurologic:  Alert, oriented, no focal deficits, cranial nerves II-XII are grossly intact.  Cognition and Speech:  Oriented, speech clear and coherent.  Psychiatric:  Cooperative, appropriate mood & affect.      Laboratory Data:   Lab Results   Component Value Date    WBC 6.1 05/09/2025    HGB 8.7 05/09/2025    HCT 26.6 05/09/2025    .0 05/09/2025    CREATSERUM 1.37 05/09/2025    BUN 25 05/09/2025     05/09/2025    K 4.2 05/09/2025     05/09/2025    CO2 24.0 05/09/2025     05/09/2025    CA 9.0 05/09/2025       Imaging:  No results found.      Assessment and Plan:    Complicated UTI  Currently growing Pseudomonas, started on Zosyn in ER will continue the same, continue to monitor clinically.    Acute kidney injury  Likely secondary dehydration, IV fluids initiated, avoid nephrotoxic medications.  Repeat BMP later.    Bladder cancer  Follows up with urology as advised    Chronic macrocytic anemia  No signs of active bleeding we will continue to monitor as needed.    BPH  Continue Flomax    Generalized asthenia  Likely multifactorial, PT/OT to evaluate and treat, continue fluids.    Prophylaxis  Subcutaneous heparin hold if develops hematuria    CODE STATUS  Full    Primary care physician  DEBRA DENNY MD    MDM: High, acute illness/severe exacerbation of chronic illness posing threat to life.  IV medications requiring close inpatient monitoring  60 minutes spent on this admission - examining patient, obtaining history, reviewing previous medical records, going over test results/imaging and discussing plan of care. All questions answered.     Disposition  Clinical course will dictate outcome      YOLANDA SCHROEDER MD  5/9/2025  9:35 PM         [1]   Past Medical History:   A-fib (HCC)    Bladder cancer (HCC)    Hypothyroidism   [2] No past surgical history on file.  [3] No family history on file.  [4] No Known Allergies

## 2025-05-10 NOTE — PROGRESS NOTES
[facility]: Initiation of oral vancomycin prophylaxis (OVP)    Jeff Lamb is at high risk of developing a C. difficile infection (CDI). Oral vancomycin prophylaxis 125 mg PO daily is being initiated per P&T approved protocol.      Eligibility for OVP:  High-risk antibiotic use: Piperacillin-Tazobactam  History of positive C. difficile result (e.g., PCR) or CDI status within the last 6 months  No active orders for oral vancomycin or oral fidaxomicin     Note: This patient does not have a C. difficile infection. All measures taken within this protocol are to decrease the risk of CDI development.    Bello Cha, PharmD  5/9/2025  11:18 PM  [place]  [facility]acy Extension: [phone]

## 2025-05-10 NOTE — PLAN OF CARE
Problem: Patient Centered Care  Goal: Patient preferences are identified and integrated in the patient's plan of care  Description: Interventions:- What would you like us to know as we care for you? From tanmay COPELAND- Provide timely, complete, and accurate information to patient/family- Incorporate patient and family knowledge, values, beliefs, and cultural backgrounds into the planning and delivery of care- Encourage patient/family to participate in care and decision-making at the level they choose- Honor patient and family perspectives and choices  Outcome: Progressing     Problem: Patient/Family Goals  Goal: Patient/Family Long Term Goal  Description: Patient's Long Term Goal: DC home Interventions:- - Monitor vitals  - Monitor appropriate labs  - Administer medications as ordered  - Follow MD's orders  - Update patient on plan of care   - Discharge planning   - See additional Care Plan goals for specific interventions  Outcome: Progressing  Goal: Patient/Family Short Term Goal  Description: Patient's Short Term Goal: feel better Interventions: - iv antibiotics, PT consult- See additional Care Plan goals for specific interventions  Outcome: Progressing     Problem: RISK FOR INFECTION - ADULT  Goal: Absence of fever/infection during anticipated neutropenic period  Description: INTERVENTIONS- Monitor WBC- Administer growth factors as ordered- Implement neutropenic guidelines  Outcome: Progressing     Problem: SAFETY ADULT - FALL  Goal: Free from fall injury  Description: INTERVENTIONS:- Assess pt frequently for physical needs- Identify cognitive and physical deficits and behaviors that affect risk of falls.- [place] fall precautions as indicated by assessment.- Educate pt/family on patient safety including physical limitations- Instruct pt to call for assistance with activity based on assessment- Modify environment to reduce risk of injury- Provide assistive devices as appropriate- Consider OT/PT consult to  assist with strengthening/mobility- Encourage toileting schedule  Outcome: Progressing

## 2025-05-10 NOTE — PROGRESS NOTES
Progress Note     Jeff Lamb Patient Status:  Inpatient    3/14/1931 MRN [id number]   Location [facility] 5SW/SE Attending Fred Oreilly MD   Hosp Day # 1 PCP DEBRA DENNY MD     Chief Complaint:   Chief Complaint   Patient presents with    Urinary Symptoms         Subjective:   S: Patient seen and examined, chart reviewed.   Patient's urine culture has been negative for 18 hours.  His culture from a week ago had Pseudomonas sensitive to all those checked.  However he failed outpatient therapy with cephalexin then Cipro.  Now he is on Zosyn.  He feels well today.  He has a chronic Shields.      Review of Systems:   10 point ROS completed and was negative, except for pertinent positive and negatives stated in subjective.                Objective:   Vital signs:  Temp:  [97.6 °F (36.4 °C)-98.6 °F (37 °C)] 98.6 °F (37 °C)  Pulse:  [59-93] 59  Resp:  [18-20] 18  BP: ()/(60-78) 104/60  SpO2:  [93 %-96 %] 95 %    Wt Readings from Last 6 Encounters:   25 190 lb 8 oz (86.4 kg)   24 175 lb (79.4 kg)   24 165 lb 2 oz (74.9 kg)   23 168 lb (76.2 kg)   22 174 lb (78.9 kg)   21 174 lb (78.9 kg)       Physical Exam:    General: No acute distress.   Respiratory: Clear to auscultation bilaterally. No wheezes. No rhonchi.  Cardiovascular: S1, S2. Regular rate and rhythm. No murmurs, rubs or gallops.   Abdomen: Soft, nontender, nondistended.  Positive bowel sounds. No rebound or guarding.  Neurologic: No focal neurological deficits.   Musculoskeletal: Moves all extremities.  Extremities: No edema.    Results:   Diagnostic Data:      Labs:    Labs Last 24 Hours:   BMP     CBC    Other     Na 140 Cl 107 BUN 23 Glu 101   Hb 8.4   PTT - Procal -   K 4.2 CO2 24.0 Cr 1.16   WBC 4.4 >< .0  INR - CRP -   Renal Lytes Endo    Hct 26.0   Trop - D dim -   eGFR - Ca 8.6 POC Gluc  -    LFT   pBNP - Lactic 2.0   eGFR AA - PO4 - A1c -   AST - APk - Prot -  LDL -      Recent Labs    Lab 05/09/25  2049 05/10/25  0542   RBC 2.67* 2.54*   HGB 8.7* 8.4*   HCT 26.6* 26.0*   MCV 99.6 102.4*   MCH 32.6 33.1   MCHC 32.7 32.3   RDW 24.5* 24.2*   NEPRELIM 2.96 2.22   WBC 6.1 4.4   .0 326.0       Lab Results   Component Value Date    PT 13.7 11/24/2015    INR 1.1 11/24/2015       Recent Labs   Lab 05/09/25  2046 05/10/25  0542   * 101*   BUN 25* 23   CREATSERUM 1.37* 1.16   CA 9.0 8.6*    140   K 4.2 4.2    107   CO2 24.0 24.0       No results for input(s): \"LAUREEN\", \"LIP\" in the last 168 hours.    No results for input(s): \"ESRML\", \"ESRPF\", \"CRP\", \"MG\", \"PHOS\", \"TROP\", \"B12\" in the last 168 hours.    No results for input(s): \"URINE\", \"CULTI\", \"BLDSMR\" in the last 168 hours.      No results found.        Pro-Calcitonin  No results for input(s): \"PCT\" in the last 168 hours.    Cardiac  No results for input(s): \"TROP\", \"PBNP\" in the last 168 hours.    Imaging: Imaging data reviewed in Epic.    No results found.       Medications: Scheduled Medications[1]    Assessment & Plan:   ASSESSMENT / PLAN:     Complicated UTI  Currently growing Pseudomonas, started on Zosyn in ER will continue the same, continue to monitor clinically.  Follow-up urine cultures currently negative     Acute kidney injury  Likely secondary dehydration, IV fluids initiated, avoid nephrotoxic medications.  Repeat BMP later.     Bladder cancer  Follows up with urology as advised     Chronic macrocytic anemia  No signs of active bleeding we will continue to monitor as needed.     BPH  Continue Flomax     Generalized asthenia  Likely multifactorial, PT/OT to evaluate and treat, continue fluids.     Prophylaxis  Subcutaneous heparin hold if develops hematuria     CODE STATUS  Full        dvt prophylaxis: sc heparin  code status: full code  dispo: home upon medical clearance    I personally reviewed the available laboratories, imaging including cultures. I discussed/will discuss the case with patient and his family. I  ordered laboratories, studies including a.m. labs. I adjusted medications including home medications. Medical decision making high, risk is high.    >55min spent, >50% spent counseling and coordinating care in the form of educating pt/family and d/w consultants and staff. Most of the time spent discussing the above plan.     Estimated date of discharge: To be determined  Discharge is dependent on: Negative cultures  At this point Mr. Lamb is expected to be discharge to: Home    Plan of care discussed with patient and his family    Fred Oreilly MD, FAAP, FACP  [facility] Hospitalist  I respond to Epic Chat and AMS Connect       [1]    donepezil  5 mg Oral Nightly    levothyroxine  25 mcg Oral Daily @ 0700    tamsulosin  0.4 mg Oral Daily    heparin  5,000 Units Subcutaneous Q12H    pantoprazole  40 mg Oral QAM AC    piperacillin-tazobactam  4.5 g Intravenous Q8H    vancomycin  125 mg Oral Daily    mirtazapine  15 mg Oral Nightly

## 2025-05-11 VITALS
DIASTOLIC BLOOD PRESSURE: 73 MMHG | HEIGHT: 72 IN | TEMPERATURE: 98 F | WEIGHT: 200.5 LBS | RESPIRATION RATE: 18 BRPM | OXYGEN SATURATION: 97 % | HEART RATE: 64 BPM | SYSTOLIC BLOOD PRESSURE: 126 MMHG | BODY MASS INDEX: 27.16 KG/M2

## 2025-05-11 PROCEDURE — 99239 HOSP IP/OBS DSCHRG MGMT >30: CPT | Performed by: INTERNAL MEDICINE

## 2025-05-11 RX ORDER — CEFADROXIL 500 MG/1
500 CAPSULE ORAL 2 TIMES DAILY
Qty: 16 CAPSULE | Refills: 0 | Status: SHIPPED | OUTPATIENT
Start: 2025-05-11 | End: 2025-05-19

## 2025-05-11 NOTE — CM/SW NOTE
ANGELICA received MDO for HH- for needs [facility] for umaña cleaning/Care/Management instruction and verification. Angelica sent tent HH referrals via aidin, f2f placed.     Once HH is avail, Angelica will meet with the patient.          05/11/25 1400   CM/SW Referral Data   Referral Source Physician   Reason for Referral Discharge planning   Informant Daughter   Medical Hx   Does patient have an established PCP? Yes   Patient Info   Patient's Home Environment Assisted Living   Post Acute Care Provider Upon Admission [facility]   Patient lives with Spouse/Significant other   Patient Status Prior to Admission   Independent with ADLs and Mobility No   Pt. requires assistance with Bathing;Dressing   Services in place prior to admission DME/Supplies at home   Type of DME/Supplies Rollator Walker   Discharge Needs   Anticipated D/C needs Home health care   Choice of Post-Acute Provider   Informed patient of right to choose their preferred provider Yes   Patient/family choice [facility]     Angelica met with patient and daughter at bedside. SW introduced self and role to daughter. Patient is currently sleeping at this time. Daughter provided above information. Daughter confirmed that patient lives at [facility] with wife. Daughter reports that he is able to ambulate with rollator, but patient needs assistance with dressing and bathing. Daughter expressed that patient was arranged with [facility], and wishes to use them. ANGELICA extended HH referral, called Phone: [phone] to confirm that they are able to accept patient, left voicemail.  Angelica faxed the HH referral with the signed f2f to , Daughter is aware that SW will likely not get response today, due to it being the weekend.       ANGELICA received MDO for POLST. ANGELICA provided POLST to daughter to review. Daughter wishes to look it over.     ANGELICA/CM to remain available for support and/or discharge planning.     RENAE Jama,  KRISTELW   x [phone]

## 2025-05-11 NOTE — PROGRESS NOTES
POLST form left for Dr. Oreilly in TL office to sign tomorrow AM. Daughter will pick it up tomorrow around 1500. RN to page Dr. Oreilly to sign prior to daughter arrival. TL aware.     Patient provided discharge education and paperwork. PIV removed.

## 2025-05-11 NOTE — DISCHARGE SUMMARY
[facility]  part of [facility]    Discharge Summary    Jeff Lamb Patient Status:  Inpatient    3/14/1931 MRN [id number]   Location [facility] 5SW/SE Attending Fred Oreilly MD   Hosp Day # 2 PCP DEBRA DENNY MD     Date of Admission: 2025 Disposition: Home or Self Care     Date of Discharge: 2025    Admitting Diagnosis: Pseudomonas aeruginosa infection [A49.8]  Urinary tract infection associated with indwelling urethral catheter, subsequent encounter [T83.511D, N39.0]    Hospital Discharge Diagnoses:       Lace+ Score: 73  59-90 High Risk  29-58 Medium Risk  0-28   Low Risk.    TCMComplicated UTI  Currently growing Pseudomonas, started on Zosyn in ER will continue the same, continue to monitor clinically.  Follow-up urine cultures currently negative     Acute kidney injury  Likely secondary dehydration, IV fluids initiated, avoid nephrotoxic medications.  Repeat BMP later.     Bladder cancer  Follows up with urology as advised     Chronic macrocytic anemia  No signs of active bleeding we will continue to monitor as needed.     BPH  Continue Flomax     Generalized asthenia  Likely multifactorial, PT/OT to evaluate and treat, continue fluids.     Prophylaxis  Subcutaneous heparin hold if develops hematuria    Follow-Up Recommendation:  LACE > 58: High Risk of readmission after discharge from the hospital.      Problem List: Problem List[1]    Reason for Admission: Urinary symptoms    Physical Exam:   General appearance: alert, appears stated age and cooperative  Pulmonary:  clear to auscultation bilaterally  Cardiovascular: S1, S2 normal, no murmur, click, rub or gallop, regular rate and rhythm  Abdominal: soft, non-tender; bowel sounds normal; no masses,  no organomegaly  Extremities: extremities normal, atraumatic, no cyanosis or edema  Psychiatric: calm      History of Present Illness: Jeff Lamb is a(n) 89 year old male, With a past medical history significant  for bladder cancer was sent to the ER after labs done outpatient indicated presence of Pseudomonas in urine.  Patient with chronic Shields after recurrent obstructions with history of bladder cancer has been experiencing suprapubic discomfort over the last few days, as a result had a UA done at his primary care's office which grew Pseudomonas.  He was earlier started on Keflex and then Cipro with some improvement in symptoms however there was no resolution.  Denies any fever or flank pain, has been noticing some hematuria.    Hospital Course:   Complicated UTI  Currently growing Pseudomonas, started on Zosyn in ER will continue the same, continue to monitor clinically.  Follow-up urine cultures currently negative     Acute kidney injury  Likely secondary dehydration, IV fluids initiated, avoid nephrotoxic medications.  Repeat BMP later.     Bladder cancer  Follows up with urology as advised     Chronic macrocytic anemia  No signs of active bleeding we will continue to monitor as needed.     BPH  Continue Flomax     Generalized asthenia  Likely multifactorial, PT/OT to evaluate and treat, continue fluids.      Consultations: none    Procedures: none    Complications: none    Discharge Condition: Good    Discharge Medications:      Discharge Medications        START taking these medications        Instructions Prescription details   cefadroxil 500 MG Caps  Commonly known as: DURICEF      Take 1 capsule (500 mg total) by mouth 2 (two) times daily for 8 days.   Stop taking on: May 19, 2025  Quantity: 16 capsule  Refills: 0            CONTINUE taking these medications        Instructions Prescription details   donepezil 5 MG Tabs  Commonly known as: Aricept      Take 1 tablet (5 mg total) by mouth nightly.   Refills: 0     levothyroxine 25 MCG Tabs  Commonly known as: Synthroid      Take 1 tablet (25 mcg total) by mouth before breakfast.   Quantity: 1 tablet  Refills: 0     mirtazapine 15 MG Tabs  Commonly known as:  Remeron      Take 1 tablet (15 mg total) by mouth nightly.   Refills: 0            STOP taking these medications      cephALEXin 500 MG Caps  Commonly known as: Keflex        ciprofloxacin 500 MG Tabs  Commonly known as: Cipro                  Where to Get Your Medications        These medications were sent to [id number] - [place], [place] - [address] [phone], [phone]   [address], [place] [address]      Phone: [phone]   cefadroxil 500 MG Caps         Follow up Visits: Follow-up with  in 1 week    Follow up Labs:      Other Discharge Instructions:     Fred Oreilly MD  5/11/2025  1:07 PM    > 35 min          [1]   Patient Active Problem List  Diagnosis    Hereditary hemochromatosis    Sensorineural hearing loss, bilateral    Gross hematuria    Anemia, unspecified type    VERONICA (acute kidney injury)    Delirium due to another medical condition    Subdural hematoma (HCC)    Urinary tract infection associated with indwelling urethral catheter, subsequent encounter    Pseudomonas aeruginosa infection

## 2025-05-11 NOTE — PLAN OF CARE
Problem: Patient Centered Care  Goal: Patient preferences are identified and integrated in the patient's plan of care  Description: Interventions:- What would you like us to know as we care for you? From tanmay COPELAND- Provide timely, complete, and accurate information to patient/family- Incorporate patient and family knowledge, values, beliefs, and cultural backgrounds into the planning and delivery of care- Encourage patient/family to participate in care and decision-making at the level they choose- Honor patient and family perspectives and choices  Outcome: Progressing     Problem: Patient/Family Goals  Goal: Patient/Family Long Term Goal  Description: Patient's Long Term Goal: DC home Interventions:- - Monitor vitals  - Monitor appropriate labs  - Administer medications as ordered  - Follow MD's orders  - Update patient on plan of care   - Discharge planning   - See additional Care Plan goals for specific interventions  Outcome: Progressing  Goal: Patient/Family Short Term Goal  Description: Patient's Short Term Goal: feel better Interventions: - iv antibiotics, PT consult- See additional Care Plan goals for specific interventions  Outcome: Progressing     Problem: RISK FOR INFECTION - ADULT  Goal: Absence of fever/infection during anticipated neutropenic period  Description: INTERVENTIONS- Monitor WBC- Administer growth factors as ordered- Implement neutropenic guidelines  Outcome: Progressing     Problem: SAFETY ADULT - FALL  Goal: Free from fall injury  Description: INTERVENTIONS:- Assess pt frequently for physical needs- Identify cognitive and physical deficits and behaviors that affect risk of falls.- Edgar fall precautions as indicated by assessment.- Educate pt/family on patient safety including physical limitations- Instruct pt to call for assistance with activity based on assessment- Modify environment to reduce risk of injury- Provide assistive devices as appropriate- Consider OT/PT consult to  assist with strengthening/mobility- Encourage toileting schedule  Outcome: Progressing

## 2025-05-11 NOTE — OCCUPATIONAL THERAPY NOTE
OCCUPATIONAL THERAPY EVALUATION - INPATIENT     Room Number: 540/540-A  Evaluation Date: 5/11/2025  Type of Evaluation: Initial   Presenting problem: urinary symptoms; UTI associated with indwelling urethral catheter  Co-morbidities: bladder cancer    Physician Order: IP Consult to Occupational Therapy  Reason for Therapy: ADL/IADL Dysfunction and Discharge Planning    OCCUPATIONAL THERAPY ASSESSMENT   Patient is a 89 year old male admitted 5/9/2025 for urinary symptoms; UTI associated with indwelling urethral catheter.  Prior to admission, patient's baseline is from [facility] where he receives assist with all ADLs and is mod I using RW for household and community distances.  Patient is currently functioning below baseline with bed mobility, transfers, and functional standing tolerance.  Patient is requiring stand-by assist and contact guard assist as a result of the following impairments: decreased functional strength, decreased endurance, decreased muscular endurance, and medical status. Occupational Therapy will continue to follow for duration of hospitalization.    Patient will benefit from continued skilled OT Services with no additional skilled services but increased support at home. Anticipate pt can return to the [facility] with continued PRN staff support.     PLAN DURING HOSPITALIZATION  OT Device Recommendations: None  OT Treatment Plan: Balance activities, Energy conservation/work simplification techniques, ADL training, Functional transfer training, Endurance training, Patient/Family education, Patient/Family training, Compensatory technique education     OCCUPATIONAL THERAPY MEDICAL/SOCIAL HISTORY   Problem List  Principal Problem:    Urinary tract infection associated with indwelling urethral catheter, subsequent encounter  Active Problems:    Pseudomonas aeruginosa infection    HOME SITUATION  Type of Home: Assisted living facility  Lives With: Spouse      Prior Level of [place]: Prior to admission  pt lived at an [facility] with his spouse where he received PRN assist for all ADLs from staff. Per family pt normally receives assist for dressing, toileting, umaña catheter management, and transfers. Pt was mod I for mobility using RW. Pt and spouse go down to the [place] for meals.     SUBJECTIVE  \"I am hungry.\"     OCCUPATIONAL THERAPY EXAMINATION      OBJECTIVE  Precautions: Hard of hearing  Fall Risk: Standard fall risk      PAIN ASSESSMENT  Ratin      ACTIVITY TOLERANCE           BP: 126/73  BP Location: Right arm  BP Method: Automatic  Patient Position: Sitting    O2 SATURATIONS  Oxygen Therapy  SPO2% on Room Air at Rest: 94    COGNITION  Following Commands:  follows one step commands consistently    RANGE OF MOTION   Upper extremity ROM is within functional limits     STRENGTH ASSESSMENT  Upper extremity strength is within functional limits     COORDINATION  Gross Motor: WFL   Fine Motor: WFL     ACTIVITIES OF DAILY LIVING ASSESSMENT  AM-PAC ‘6-Clicks’ Inpatient Daily Activity Short Form  How much help from another person does the patient currently need…  -   Putting on and taking off regular lower body clothing?: A Lot  -   Bathing (including washing, rinsing, drying)?: A Lot  -   Toileting, which includes using toilet, bedpan or urinal? : A Lot  -   Putting on and taking off regular upper body clothing?: A Little  -   Taking care of personal grooming such as brushing teeth?: A Little  -   Eating meals?: A Little    AM-PAC Score:  Score: 15  Approx Degree of Impairment: 56.46%  Standardized Score (AM-PAC Scale): 34.69  CMS Modifier (G-Code): CK    FUNCTIONAL TRANSFER ASSESSMENT  Sit to Stand: Edge of Bed  Edge of Bed: Contact Guard Assist  Simulated toilet transfer: CGA at RW level     FUNCTIONAL MOBILITY  Pt required CGA with RW support to complete room distance mobility to simulate household distances.   Comments: Pt with no LOB, SOB, or dizziness with activity.       BED MOBILITY  Supine to Sit :  Stand-by Assist    BALANCE ASSESSMENT  Static Sitting: Stand-by Assist  Static Standing: Contact Guard Assist  Dynamic sitting: SBA  Dynamic Standing: CGA     FUNCTIONAL ADL ASSESSMENT  Eating: Independent  Grooming Seated: Supervision    Skilled Therapy Provided:   RN cleared pt for participation. Pt received in bed with son and daughter present for session. Pt agreeable to participation in therapy. To assess pt's participation during tasks while also providing intermittent education to maximize participation, OT facilitated the following: bed mobility, functional transfers, mobility, and ADL tasks. Pt tolerated treatment well and completed all tasks with assist levels listed above. Pt demo good safety awareness, proper body mechanics, and good RW management during session. Pt remained in recliner with all needs in reach and family present at end of session. No chair alarm box present in room; RN approved for pt to be in chair with no alarm with family present.     EDUCATION PROVIDED  Patient Education : Role of Occupational Therapy; Plan of Care; Functional Transfer Techniques; Fall Prevention; Posture/Positioning; Energy Conservation; Proper Body Mechanics  Patient's Response to Education: Verbalized Understanding; Returned Demonstration  Family/Caregiver Education : Role of Occupational Therapy; Plan of Care  Family/Caregiver's Response to Education: Verbalized Understanding    The patient's Approx Degree of Impairment: 56.46% has been calculated based on documentation in the [facility] '6 clicks' Inpatient Daily Activity Short Form.  Research supports that patients with this level of impairment may benefit from rehab but anticipate pt could return home with continued 24/7 support from [facility] staff.  Final disposition will be made by interdisciplinary medical team.     Patient End of Session: Up in chair, Needs met, Call light within reach, RN aware of session/findings, All patient questions and concerns addressed,  Hospital anti-slip socks, Family present, Other (Comment) (no chair alarm box present in room; RN aware and approved transfer to chair with no alarm)    OT Goals  Patients self stated goal is: none stated      Patient will complete functional transfer with mod I   Comment:     Patient will complete toileting with SUP  Comment:     Patient will tolerate standing for 5 minutes in prep for adls with mod I    Comment:    Patient will complete item retrieval with mod I   Comment:          Goals  on: 25  Frequency: 3-5x/week    Patient Evaluation Complexity Level:   Occupational Profile/Medical History MODERATE - Expanded review of history including review of medical or therapy record   Specific performance deficits impacting engagement in ADL/IADL MODERATE  3 - 5 performance deficits   Client Assessment/Performance Deficits MODERATE - Comorbidities and min to mod modifications of tasks    Clinical Decision Making MODERATE - Analysis of occupational profile, detailed assessments, several treatment options    Overall Complexity MODERATE     OT Session Time: 18 minutes  Self-Care Home Management: 18 minutes

## 2025-05-11 NOTE — SPIRITUAL CARE NOTE
Spiritual Care Visit Note    Patient Name: Jeff Lamb Date of Spiritual Care Visit: 05/10/25   : 3/14/1931 Primary Dx: Urinary tract infection associated with indwelling urethral catheter, subsequent encounter   Referred By: Referral From: Other (Comment) (Spiritual Consult request)    Spiritual Care Taxonomy:    Intended Effects: Demonstrate caring and concern    Methods: Offer support, Demonstrate acceptance, Encourage story-telling, Encourage sharing of feelings    Interventions: Acknowledge current situation, Acknowledge response to difficult experience, Active listening, Ask questions to bring forth feelings, Share words of hope and inspiration, Identify supportive relationship(s)    Visit Type/Summary:  Jeff appeared to be sleeping when  arrived at hiss room but his daughter, Elysia, was sitting at bedside.  She spoke of some of the recent changes in her parents living arrangements and hoped some of the adjustments required of them were not too difficult. We also spoke of nutrition, sleep and activity requirements needed to mitigate health challenges as we age.  She is grateful that her parents assisted living facility offers activities in which they can participate and socialize and feels they are getting nutritious food.  She was hopeful that her father's hospital stay would be short.  She knows  service is available , for herself or her father, as needed.   - Spiritual Care: Offered empathetic listening and emotional support. Provided information regarding how to contact Spiritual Care.  remains available as needed for follow up.    Spiritual Care support can be requested via an Epic consult. For urgent/immediate needs, please contact the On Call  at: [place]: ext [phone]    Ana Vyas MDiv.  Staff

## 2025-05-11 NOTE — PLAN OF CARE
Problem: Patient Centered Care  Goal: Patient preferences are identified and integrated in the patient's plan of care  Description: Interventions:- What would you like us to know as we care for you? From tanmay [place]- Provide timely, complete, and accurate information to patient/family- Incorporate patient and family knowledge, values, beliefs, and cultural backgrounds into the planning and delivery of care- Encourage patient/family to participate in care and decision-making at the level they choose- Honor patient and family perspectives and choices  Outcome: Progressing     Problem: Patient/Family Goals  Goal: Patient/Family Long Term Goal  Description: Patient's Long Term Goal: DC home Interventions:- - Monitor vitals  - Monitor appropriate labs  - Administer medications as ordered  - Follow MD's orders  - Update patient on plan of care   - Discharge planning   - See additional Care Plan goals for specific interventions  Outcome: Progressing  Goal: Patient/Family Short Term Goal  Description: Patient's Short Term Goal: feel better Interventions: - iv antibiotics, PT consult- See additional Care Plan goals for specific interventions  Outcome: Progressing     Problem: RISK FOR INFECTION - ADULT  Goal: Absence of fever/infection during anticipated neutropenic period  Description: INTERVENTIONS- Monitor WBC- Administer growth factors as ordered- Implement neutropenic guidelines  Outcome: Progressing     Problem: SAFETY ADULT - FALL  Goal: Free from fall injury  Description: INTERVENTIONS:- Assess pt frequently for physical needs- Identify cognitive and physical deficits and behaviors that affect risk of falls.- [place] fall precautions as indicated by assessment.- Educate pt/family on patient safety including physical limitations- Instruct pt to call for assistance with activity based on assessment- Modify environment to reduce risk of injury- Provide assistive devices as appropriate- Consider OT/PT consult to  assist with strengthening/mobility- Encourage toileting schedule  Outcome: Progressing

## 2025-05-12 ENCOUNTER — TELEPHONE (OUTPATIENT)
Dept: SURGERY | Facility: CLINIC | Age: OVER 89
End: 2025-05-12

## 2025-05-12 NOTE — TELEPHONE ENCOUNTER
-S/w pt's Dtr Elysia; identity verified w/ name & .  -For clarification, Dtr reports following previous In-pt status, pt was assigned to [facility].  -However, pt/ wife resides @ [place]/ [place], & Dtr wants to establish care with [facility].  -She agrees to [facility] on 25 w/ Dr. Avery.   -Encounter complete.

## 2025-05-12 NOTE — CM/SW NOTE
05/12/25 0900   Discharge disposition   Expected discharge disposition Home-Health   Post Acute Care Provider   ([facility])   Discharge transportation Private car     SW followed up on discharge planning.    Liaison Briana lindsey/[facility] confirmed pt is active with them.    Signed order/DC paperwork attached to [facility] referral.    Briana informed pt discharged 5/11.    PLAN: DC to [facility] 5/11    / to remain available for support and/or discharge planning.     Briana MACDONALD, LSW  Discharge Planner

## 2025-05-13 NOTE — PAYOR COMM NOTE
--------------RECONSIDERATION REQUEST FOR INPATIENT SERVICES    ADMISSION REVIEW     Payor: [employer] ADV PPO  Subscriber #:  [id number]  Authorization Number: [id number]    Admit date: 25  Admit time: 10:54 PM       REVIEW DOCUMENTATION:     ED Provider Notes        ED Provider Notes signed by Anika Segundo DO at 2025  9:45 PM      [facility] Emergency Department Note  Patient: Jeff Lamb Age: 89 year old Sex: male      MRN: [id number]  : 3/14/1931    Patient Seen in: [facility] Emergency Department    History     Chief Complaint   Patient presents with    Urinary Symptoms     Stated Complaint: Urinary Symptoms UTI+    History obtained from: pt, daughter     This is a 89-year-old male history of prior bladder cancer status post dissection, A-fib not currently on Eliquis, history of urine retention with chronic indwelling Shields here due to abnormal urine culture sent in for IV antibiotics.  Patient states he has been having some urinary symptoms ongoing for the last couple of weeks.  They initially started some antibiotics in late April for UTI for UTI, presented to  on 2025 with suprapubic pressure and sensation of urgency. Pt discharged on Cipro which he's been taking without change in symptoms. Per daughter they got a call today that the urine culture was resistant and he needed IV antibiotics. Pt currently reports mild occasional suprapubic discomfort and urgency, but denies burning. No hematuria. No flank pain. No fever or chills. No vomiting or diarrhea, no CP or SOB.     Review of Systems:  Review of Systems  Positive for stated complaint: Urinary Symptoms UTI+. Constitutional and vital signs reviewed. All other systems reviewed and negative except as noted above.    PSFH elements reviewed from today and agreed except as otherwise stated in HPI.    Physical Exam     ED Triage Vitals [25]   /78   Pulse 93   Resp 20   Temp 97.6 °F (36.4 °C)   Temp src Oral   SpO2 94  %   O2 Device None (Room air)       Current:/65   Pulse 80   Temp 97.6 °F (36.4 °C) (Oral)   Resp 19   SpO2 93%         Physical Exam  Vitals and nursing note reviewed.   Constitutional:       General: He is not in acute distress.     Appearance: He is not ill-appearing.   HENT:      Head: Normocephalic and atraumatic.      Mouth/Throat:      Pharynx: Oropharynx is clear.   Eyes:      Conjunctiva/sclera: Conjunctivae normal.   Cardiovascular:      Rate and Rhythm: Normal rate and regular rhythm.      Heart sounds: No murmur heard.     Comments: 2+ radial and dp pulses bilaterally   Pulmonary:      Effort: Pulmonary effort is normal. No respiratory distress.      Breath sounds: No wheezing or rales.   Abdominal:      General: There is no distension.      Palpations: Abdomen is soft. There is no mass.      Tenderness: There is no abdominal tenderness. There is no right CVA tenderness, left CVA tenderness, guarding or rebound.   Genitourinary:     Comments: Shields in place, dark analy urine in leg bag   Musculoskeletal:         General: No deformity.   Skin:     General: Skin is warm and dry.      Capillary Refill: Capillary refill takes less than 2 seconds.   Neurological:      General: No focal deficit present.      Mental Status: He is alert.         ED Course   Labs:   Labs Reviewed   BASIC METABOLIC PANEL (8) - Abnormal; Notable for the following components:       Result Value    Glucose 110 (*)     BUN 25 (*)     Creatinine 1.37 (*)     eGFR-Cr 48 (*)     All other components within normal limits   CBC WITH DIFFERENTIAL WITH PLATELET - Abnormal; Notable for the following components:    RBC 2.67 (*)     HGB 8.7 (*)     HCT 26.6 (*)     RDW-SD 90.0 (*)     RDW 24.5 (*)     Monocyte Absolute 1.19 (*)     All other components within normal limits   URINALYSIS WITH CULTURE REFLEX - Abnormal; Notable for the following components:    Blood Urine 2+ (*)     Protein Urine Trace (*)     Leukocyte Esterase Urine  250 (*)     WBC Urine >50 (*)     RBC Urine >10 (*)     All other components within normal limits   LACTIC ACID, PLASMA - Normal   RBC MORPHOLOGY SCAN   BLOOD CULTURE   BLOOD CULTURE   URINE CULTURE, ROUTINE     Radiology findings:    No results found.    Cardiac Monitor: Interpreted by me.   Pulse Readings from Last 1 Encounters:   05/09/25 80   , sinus,     External non-ED records reviewed independently by me: reviewed urine culture sent on 5/4/2025 showing Pseudomonas uti    URINE CULTURE >100,000 CFU/ML Pseudomonas aeruginosa Abnormal         Resulting Agency: [facility] ([facility])     Susceptibility     Pseudomonas aeruginosa    Not Specified   Cefepime <=2 Sensitive   Ceftazidime 4 Sensitive   Ciprofloxacin 2 Intermediate   Levofloxacin 4 Intermediate   Meropenem <=1 Sensitive   Piperacillin + Tazobactam <=4 Sensitive   Tobramycin <=1 Sensitive                 MDM   This is a 89-year-old male history of prior bladder cancer status post dissection, A-fib not currently on Eliquis, history of urine retention with chronic indwelling Shields here due to abnormal urine culture sent in for IV antibiotics.    Reviewed Ucx as above with Pseudomonas aeruginosa growing from Ucx from 5/4/2025     Differential diagnoses considered includes, but is not limited to:   Catheter associated UTI from pseudomonas  Less likely sepsis, electrolyte abnormality or VERONICA     Will obtain the following tests: cbc, bmp, bcx, ua/ucx, lactic  Will administer the following medications/therapies: IV zosyn. Discussed with pharmacist Judith watson given CAUTI would be appropriate for zosyn     Please see ED course for my independent review of these tests/imaging results.    Chronic conditions affecting care: hx of bladder CA, afib     Workup and medications considered but not ordered: n/a    Social Determinants of Health that impacted care: n/a       ED Course as of 05/09/25 2144  ------------------------------------------------------------  Time: 05/09  2035  Comment: Case d/w Dr. Justice accepts admission   ------------------------------------------------------------  Time: 05/09 2144  Comment: UA c/w UTI with microscopic hematuria. Lactic normal. Hb stable with chronic anemia. Mild elevation in Cr on BMP, will give IV fluids             Procedures:  Procedures        Disposition and Plan     Clinical Impression:  1. Urinary tract infection associated with indwelling urethral catheter, subsequent encounter    2. Pseudomonas aeruginosa infection        Disposition:  Admit        Hospital Problems       Present on Admission  Date Reviewed: 5/4/2025          ICD-10-CM Noted POA    * (Principal) Urinary tract infection associated with indwelling urethral catheter, subsequent encounter T83.511D, N39.0 5/9/2025 Unknown        This note may have been created using voice dictation technology and may include inadvertent errors.      Anika Segundo DO  Attending Physician   Emergency Medicine           Signed by Anika Segundo DO on 5/9/2025  9:45 PM           History and Physical    H&P signed by Yecenia Justice MD at 5/10/2025  8:09 AM    [facility]  [facility] of [facility]     History & Physical        Date:  5/9/2025  Date of Admission:  5/9/2025     Chief Complaint:      Chief Complaint   Patient presents with    Urinary Symptoms         History of Present Illness:  Jeff Lamb is a(n) 89 year old male, With a past medical history significant for bladder cancer was sent to the ER after labs done outpatient indicated presence of Pseudomonas in urine.  Patient with chronic Shields after recurrent obstructions with history of bladder cancer has been experiencing suprapubic discomfort over the last few days, as a result had a UA done at his primary care's office which grew Pseudomonas.  He was earlier started on Keflex and then Cipro with some improvement in symptoms however there was no resolution.  Denies any fever or flank pain, has been noticing some  hematuria.     Assessment and Plan:     Complicated UTI  Currently growing Pseudomonas, started on Zosyn in ER will continue the same, continue to monitor clinically.     Acute kidney injury  Likely secondary dehydration, IV fluids initiated, avoid nephrotoxic medications.  Repeat BMP later.     Bladder cancer  Follows up with urology as advised     Chronic macrocytic anemia  No signs of active bleeding we will continue to monitor as needed.     BPH  Continue Flomax     Generalized asthenia  Likely multifactorial, PT/OT to evaluate and treat, continue fluids.     Prophylaxis  Subcutaneous heparin hold if develops hematuria     CODE STATUS  Full     Primary care physician  DEBRA DENNY MD     MDM: High, acute illness/severe exacerbation of chronic illness posing threat to life.  IV medications requiring close inpatient monitoring  60 minutes spent on this admission - examining patient, obtaining history, reviewing previous medical records, going over test results/imaging and discussing plan of care. All questions answered.      Disposition  Clinical course will dictate outcome        YOLANDA SCHROEDER MD  5/9/2025              5/10          Date of Service: 5/10/2025  4:58 PM     Signed            Progress Note         Chief Complaint:       Chief Complaint   Patient presents with    Urinary Symptoms            Subjective:   S: Patient seen and examined, chart reviewed.   Patient's urine culture has been negative for 18 hours.  His culture from a week ago had Pseudomonas sensitive to all those checked.  However he failed outpatient therapy with cephalexin then Cipro.  Now he is on Zosyn.  He feels well today.  He has a chronic Shields.        Review of Systems:   10 point ROS completed and was negative, except for pertinent positive and negatives stated in subjective.                       Objective:   Vital signs:  Temp:  [97.6 °F (36.4 °C)-98.6 °F (37 °C)] 98.6 °F (37 °C)  Pulse:  [59-93] 59  Resp:  [18-20] 18  BP:  ()/(60-78) 104/60  SpO2:  [93 %-96 %] 95 %         Wt Readings from Last 6 Encounters:   05/09/25 190 lb 8 oz (86.4 kg)   12/31/24 175 lb (79.4 kg)   12/22/24 165 lb 2 oz (74.9 kg)   02/21/23 168 lb (76.2 kg)   11/17/22 174 lb (78.9 kg)   07/28/21 174 lb (78.9 kg)         Physical Exam:    General: No acute distress.   Respiratory: Clear to auscultation bilaterally. No wheezes. No rhonchi.  Cardiovascular: S1, S2. Regular rate and rhythm. No murmurs, rubs or gallops.   Abdomen: Soft, nontender, nondistended.  Positive bowel sounds. No rebound or guarding.  Neurologic: No focal neurological deficits.   Musculoskeletal: Moves all extremities.  Extremities: No edema.     Results:   Diagnostic Data:       Labs:     Labs Last 24 Hours:                      BMP         CBC       Other      Na 140 Cl 107 BUN 23 Glu 101     Hb 8.4     PTT - Procal -    K 4.2 CO2 24.0 Cr 1.16     WBC 4.4 >< .0   INR - CRP -    Renal Lytes Endo       Hct 26.0     Trop - D dim -    eGFR - Ca 8.6 POC Gluc  -       LFT     pBNP - Lactic 2.0    eGFR AA - PO4 - A1c -     AST - APk - Prot -   LDL -              Recent Labs   Lab 05/09/25  2049 05/10/25  0542   RBC 2.67* 2.54*   HGB 8.7* 8.4*   HCT 26.6* 26.0*   MCV 99.6 102.4*   MCH 32.6 33.1   MCHC 32.7 32.3   RDW 24.5* 24.2*   NEPRELIM 2.96 2.22   WBC 6.1 4.4   .0 326.0               Lab Results   Component Value Date     PT 13.7 11/24/2015     INR 1.1 11/24/2015              Recent Labs   Lab 05/09/25  2046 05/10/25  0542   * 101*   BUN 25* 23   CREATSERUM 1.37* 1.16   CA 9.0 8.6*    140   K 4.2 4.2    107   CO2 24.0 24.0         [Scheduled Medications]   donepezil  5 mg Oral Nightly    levothyroxine  25 mcg Oral Daily @ 0700    tamsulosin  0.4 mg Oral Daily    heparin  5,000 Units Subcutaneous Q12H    pantoprazole  40 mg Oral QAM AC    piperacillin-tazobactam  4.5 g Intravenous Q8H    vancomycin  125 mg Oral Daily    mirtazapine  15 mg Oral Nightly         Assessment & Plan:   ASSESSMENT / PLAN:      Complicated UTI  Currently growing Pseudomonas, started on Zosyn in ER will continue the same, continue to monitor clinically.  Follow-up urine cultures currently negative     Acute kidney injury  Likely secondary dehydration, IV fluids initiated, avoid nephrotoxic medications.  Repeat BMP later.     Bladder cancer  Follows up with urology as advised     Chronic macrocytic anemia  No signs of active bleeding we will continue to monitor as needed.     BPH  Continue Flomax     Generalized asthenia  Likely multifactorial, PT/OT to evaluate and treat, continue fluids.     Prophylaxis  Subcutaneous heparin hold if develops hematuria     CODE STATUS  Full           dvt prophylaxis: sc heparin  code status: full code  dispo: home upon medical clearance     I personally reviewed the available laboratories, imaging including cultures. I discussed/will discuss the case with patient and his family. I ordered laboratories, studies including a.m. labs. I adjusted medications including home medications. Medical decision making high, risk is high.     >55min spent, >50% spent counseling and coordinating care in the form of educating pt/family and d/w consultants and staff. Most of the time spent discussing the above plan.      Estimated date of discharge: To be determined  Discharge is dependent on: Negative cultures  At this point Mr. Lamb is expected to be discharge to: Home     Plan of care discussed with patient and his family     Fred Oreilly MD, FAAP, FACP  [facility] Hospitalist  I respond to [employer] Chat and [facility]MS Connect                   Vitals (last day) before discharge       Date/Time Temp Pulse Resp BP SpO2 Weight O2 Device O2 Flow Rate (L/min) [facility]    05/11/25 0943 -- -- -- 126/73 -- -- -- -- GS    05/11/25 0943 97.8 °F (36.6 °C) 64 18 -- 97 % -- None (Room air) -- RM    05/11/25 0554 -- -- -- -- -- 200 lb 8 oz (90.9 kg) -- -- NH    05/11/25 0537 98.4 °F (36.9 °C) -- 18 130/62  96 % -- None (Room air) -- NH    05/10/25 2041 97.8 °F (36.6 °C) 75 16 108/59 99 % -- None (Room air) -- NH    05/10/25 1742 98.3 °F (36.8 °C) 78 18 102/58 98 % -- None (Room air) -- CE    05/10/25 0824 98.6 °F (37 °C) 59 18 104/60 95 % -- None (Room air) -- CE    05/10/25 0617 98.5 °F (36.9 °C) 62 18 102/61 95 % -- None (Room air) -- NM            Medication Administration Report  for Jeff Lamb as of 05/02/25 through 05/11/25  Legend:       Medications 05/02 05/03 05/04 05/05 05/06 05/07 05/08 05/09 05/10 05/11   Completed Medications   piperacillin-tazobactam (Zosyn) 4.5 g in dextrose 5% 100 mL IVPB-ADDV  Dose: 4.5 g  Freq: Once Route: IV  Start: 05/09/25 2024 End: 05/09/25 2132   Admin Instructions:   Incompatible with Lactated Ringers (LR)   Order specific questions:              [id number]          sodium chloride 0.9 % IV bolus 1,000 mL  Dose: 1,000 mL  Freq: Once Route: IV  Start: 05/09/25 2145 End: 05/09/25 4051 [id number]     [id number]          Discontinued Medications   Medications 05/02 05/03 05/04 05/05 05/06 05/07 05/08 05/09 05/10 05/11                             donepezil (Aricept) tab 5 mg  Dose: 5 mg  Freq: Nightly Route: OR  Start: 05/09/25 2315 End: 05/11/25 9621 [id number]         heparin (Porcine) 5000 UNIT/ML injection 5,000 Units  Dose: 5,000 Units  Freq: Every 12 hours Route: SC  Start: 05/09/25 2315 End: 05/11/25 1741           ([id number] 02127      [id number]        levothyroxine (Synthroid) tab 25 mcg  Dose: 25 mcg  Freq: Daily at 0700 Route: OR  Start: 05/10/25 0700 End: 05/11/25 1741   Admin Instructions:   Give on an empty stomach. Hold tube feedings 1 hour before AND after.            [id number]      [id number]        mirtazapine (Remeron) tab 15 mg  Dose: 15 mg  Freq: Nightly Route: OR  Start: 05/09/25 2330 End: 05/11/25 1741           [id number]      [id number]                      pantoprazole (Protonix) DR tab 40 mg  Dose: 40 mg  Freq: Every morning before  breakfast Route: OR  Start: 05/10/25 0700 End: 05/11/25 1741   Admin Instructions:   Do not crush            [id number]      [id number]        piperacillin-tazobactam (Zosyn) 4.5 g in dextrose 5% 100 mL IVPB-ADDV  Dose: 4.5 g  Freq: Every 8 hours Route: IV  Start: 05/10/25 0200 End: 05/11/25 1741   Admin Instructions:   Incompatible with Lactated Ringers (LR)   Order specific questions:               [id number]     [id number]     [id number]      [id number]     [id number]        sodium chloride 0.9% infusion  Rate: 100 mL/hr  Freq: Continuous Route: IV  Start: 05/09/25 2315 End: 05/11/25 1741           [id number]          tamsulosin (Flomax) cap 0.4 mg  Dose: 0.4 mg  Freq: Daily Route: OR  Start: 05/10/25 0930 End: 05/11/25 1741            [id number]      [id number]        vancomycin (Vancocin) cap 125 mg  Dose: 125 mg  Freq: Daily Route: OR  Start: 05/10/25 0930 End: 05/11/25 1741   Admin Instructions:   Prophylactic Dose for C. Diff   Order specific questions:               [id number]      [id number]                     Medications 05/02 05/03 05/04 05/05 05/06 05/07 05/08 05/09 05/10 05/11

## 2025-05-13 NOTE — PAYOR COMM NOTE
--------------  DISCHARGE REVIEW    Payor: [facility] MEDICARE ADV PPO  Subscriber #:  [id number]  Authorization Number: [id number]    Admit date: 25  Admit time:  10:54 PM  Discharge Date: 2025  3:41 PM     Admitting Physician: Yecenia Justice MD  Attending Physician:  No att. providers found  Primary Care Physician: Debra Smalls MD          Discharge Summary Notes        Discharge Summary signed by Fred Oreilly MD at 2025  2:45 PM       Author: Fred Oreilly MD Specialty: HOSPITALIST Author Type: Physician    Filed: 2025  2:45 PM Date of Service: 2025  1:07 PM Status: Signed    : Fred Oreilly MD (Physician)         [facility]  part of [facility]    Discharge Summary    [facility] Patient Status:  Inpatient    3/14/1931 MRN [id number]   Location [facility] 5SW/SE Attending Fred Oreilly MD   Hosp Day # 2 PCP DEBRA SMALLS MD     Date of Admission: 2025 Disposition: Home or Self Care     Date of Discharge: 2025    Admitting Diagnosis: Pseudomonas aeruginosa infection [A49.8]  Urinary tract infection associated with indwelling urethral catheter, subsequent encounter [T83.511D, N39.0]    Hospital Discharge Diagnoses:       Lace+ Score: 73  59-90 High Risk  29-58 Medium Risk  0-28   Low Risk.    TCMComplicated UTI  Currently growing Pseudomonas, started on Zosyn in ER will continue the same, continue to monitor clinically.  Follow-up urine cultures currently negative     Acute kidney injury  Likely secondary dehydration, IV fluids initiated, avoid nephrotoxic medications.  Repeat BMP later.     Bladder cancer  Follows up with urology as advised     Chronic macrocytic anemia  No signs of active bleeding we will continue to monitor as needed.     BPH  Continue Flomax     Generalized asthenia  Likely multifactorial, PT/OT to evaluate and treat, continue fluids.     Prophylaxis  Subcutaneous heparin hold if develops  hematuria    Follow-Up Recommendation:  LACE > 58: High Risk of readmission after discharge from the hospital.      Problem List: Problem List[1]    Reason for Admission: Urinary symptoms    Physical Exam:   General appearance: alert, appears stated age and cooperative  Pulmonary:  clear to auscultation bilaterally  Cardiovascular: S1, S2 normal, no murmur, click, rub or gallop, regular rate and rhythm  Abdominal: soft, non-tender; bowel sounds normal; no masses,  no organomegaly  Extremities: extremities normal, atraumatic, no cyanosis or edema  Psychiatric: calm      History of Present Illness: Jeff Lamb is a(n) 89 year old male, With a past medical history significant for bladder cancer was sent to the ER after labs done outpatient indicated presence of Pseudomonas in urine.  Patient with chronic Shields after recurrent obstructions with history of bladder cancer has been experiencing suprapubic discomfort over the last few days, as a result had a UA done at his primary care's office which grew Pseudomonas.  He was earlier started on Keflex and then Cipro with some improvement in symptoms however there was no resolution.  Denies any fever or flank pain, has been noticing some hematuria.    Hospital Course:   Complicated UTI  Currently growing Pseudomonas, started on Zosyn in ER will continue the same, continue to monitor clinically.  Follow-up urine cultures currently negative     Acute kidney injury  Likely secondary dehydration, IV fluids initiated, avoid nephrotoxic medications.  Repeat BMP later.     Bladder cancer  Follows up with urology as advised     Chronic macrocytic anemia  No signs of active bleeding we will continue to monitor as needed.     BPH  Continue Flomax     Generalized asthenia  Likely multifactorial, PT/OT to evaluate and treat, continue fluids.      Consultations: none    Procedures: none    Complications: none    Discharge Condition: Good    Discharge Medications:      Discharge  Medications        START taking these medications        Instructions Prescription details   cefadroxil 500 MG Caps  Commonly known as: DURICEF      Take 1 capsule (500 mg total) by mouth 2 (two) times daily for 8 days.   Stop taking on: May 19, 2025  Quantity: 16 capsule  Refills: 0            CONTINUE taking these medications        Instructions Prescription details   donepezil 5 MG Tabs  Commonly known as: Aricept      Take 1 tablet (5 mg total) by mouth nightly.   Refills: 0     levothyroxine 25 MCG Tabs  Commonly known as: Synthroid      Take 1 tablet (25 mcg total) by mouth before breakfast.   Quantity: 1 tablet  Refills: 0     mirtazapine 15 MG Tabs  Commonly known as: Remeron      Take 1 tablet (15 mg total) by mouth nightly.   Refills: 0            STOP taking these medications      cephALEXin 500 MG Caps  Commonly known as: Keflex        ciprofloxacin 500 MG Tabs  Commonly known as: Cipro                  Where to Get Your Medications        These medications were sent to [id number] - [place], [place] - [address] [phone], [phone]  [address], [place] [address]      Phone: [phone]   cefadroxil 500 MG Caps         Follow up Visits: Follow-up with  in 1 week    Follow up Labs:      Other Discharge Instructions:     Fred Oreilly MD  5/11/2025  1:07 PM    > 35 min       Electronically signed by Fred Oreilly MD on 5/12/2025  2:45 PM         REVIEWER COMMENTS         [1]   Patient Active Problem List  Diagnosis    Hereditary hemochromatosis    Sensorineural hearing loss, bilateral    Gross hematuria    Anemia, unspecified type    VERONICA (acute kidney injury)    Delirium due to another medical condition    Subdural hematoma (HCC)    Urinary tract infection associated with indwelling urethral catheter, subsequent encounter    Pseudomonas aeruginosa infection

## 2025-05-16 ENCOUNTER — OFFICE VISIT (OUTPATIENT)
Dept: SURGERY | Facility: CLINIC | Age: OVER 89
End: 2025-05-16
Payer: MEDICARE

## 2025-05-16 VITALS
HEART RATE: 73 BPM | SYSTOLIC BLOOD PRESSURE: 136 MMHG | HEIGHT: 72 IN | BODY MASS INDEX: 27.09 KG/M2 | DIASTOLIC BLOOD PRESSURE: 72 MMHG | WEIGHT: 200 LBS

## 2025-05-16 DIAGNOSIS — R31.0 GROSS HEMATURIA: Primary | ICD-10-CM

## 2025-05-16 PROCEDURE — 3008F BODY MASS INDEX DOCD: CPT | Performed by: UROLOGY

## 2025-05-16 PROCEDURE — 99204 OFFICE O/P NEW MOD 45 MIN: CPT | Performed by: UROLOGY

## 2025-05-16 PROCEDURE — 1159F MED LIST DOCD IN RCRD: CPT | Performed by: UROLOGY

## 2025-05-16 PROCEDURE — 3078F DIAST BP <80 MM HG: CPT | Performed by: UROLOGY

## 2025-05-16 PROCEDURE — 1160F RVW MEDS BY RX/DR IN RCRD: CPT | Performed by: UROLOGY

## 2025-05-16 PROCEDURE — 3075F SYST BP GE 130 - 139MM HG: CPT | Performed by: UROLOGY

## 2025-05-16 PROCEDURE — 1111F DSCHRG MED/CURRENT MED MERGE: CPT | Performed by: UROLOGY

## 2025-05-16 NOTE — PROGRESS NOTES
Cindi Avery MD  Department of Urology  [address]  [place] [address]    T: [phone]  F: [phone]    To: DEBRA DENNY MD   [address]  [place] [address]    Re: Jeff Lamb   MRN: [id number]  : 3/14/1931    Dear DEBRA DENNY MD,    Today I had the pleasure of seeing Jeff Lamb in my clinic. As you know, Mr. Lamb is a pleasant 89 year old year old male who I am seeing for urinary retention. Patient was last seen in this department on Visit date not found.    Briefly, patient is a longtime patient at [facility].  Please note that he has had gross hematuria requiring clot evacuation which was performed by Allyson Luna in 2024.  He also has seen [facility] urology as well.  Please note that Allyson Luna evacuated clot but did not see any significant tumors at that time.    He does have a history of bladder cancer and had a TURBT in 2024 at [facility] which was high-grade noninvasive transitional cell carcinoma.  They recommended multiple treatment options including watchful waiting versus surveillance cystoscopies versus maintenance BCG.  They ultimately switched their care to [facility].    He was last seen by [facility] in 2025 during which they recommended catheters being changed on a 30-day basis.  They were unclear whether they wanted to pursue surveillance cystoscopies or not.       PAST MEDICAL HISTORY:  Past Medical History[1]     PAST SURGICAL HISTORY:  Past Surgical History[2]      ALLERGIES:  Allergies[3]      MEDICATIONS:  Current Outpatient Medications   Medication Instructions    cefadroxil (DURICEF) 500 mg, Oral, 2 times daily    donepezil (ARICEPT) 5 mg, Nightly    levothyroxine (SYNTHROID) 25 mcg, Before breakfast    mirtazapine (REMERON) 15 mg, Nightly        FAMILY HISTORY:  Family History[4]     SOCIAL HISTORY:  Short Social Hx on File[5]       PHYSICAL EXAMINATION:  There were no vitals filed for this  visit.  CONSTITUTIONAL: No apparent distress, cooperative and communicative  NEUROLOGIC: Alert and oriented   HEAD: Normocephalic, atraumatic   EYES: Sclera non-icteric   ENT: Hearing intact, moist mucous membranes   NECK: No obvious goiter or masses   RESPIRATORY: Normal respiratory effort, Nonlabored breathing on room air  SKIN: No evident rashes   ABDOMEN: Soft, nontender, nondistended, no rebound tenderness, no guarding, no masses      REVIEW OF SYSTEMS:    A comprehensive 10-point review of systems was completed.  Pertinent positives and negatives are noted in the the HPI.       LABORATORY DATA:  Ref Range & Units (hover) 5/10/25  5:42 AM   WBC 4.4   RBC 2.54 Low    HGB 8.4 Low    HCT 26.0 Low    .4 High    MCH 33.1   MCHC 32.3   RDW-SD 89.5 High    Comment: No morphology performed. No clinically significant change in results.   RDW 24.2 High    Comment: No morphology performed. No clinically significant change in results.   .0   Neutrophil Absolute Prelim 2.22   Neutrophil Absolute 2.22   Lymphocyte Absolute 1.05   Monocyte Absolute 0.90   Eosinophil Absolute 0.14   Basophil Absolute 0.09   Immature Granulocyte Absolute 0.03   Neutrophil % 50.1   Lymphocyte % 23.7   Monocyte % 20.3        Ref Range & Units (hover) 5/10/25  5:42 AM   Glucose 101 High    Sodium 140   Potassium 4.2   Chloride 107   CO2 24.0   Anion Gap 9   BUN 23   Creatinine 1.16   BUN/CREA Ratio 19.8   Calcium, Total 8.6 Low    Calculated Osmolality 294   eGFR-Cr 58 Low      BLOOD CULTURE RESULT No Growth 5 Days           IMAGING REVIEW:  Narrative   PROCEDURE: XR ABDOMEN (1 VIEW) (CPT=74018)     COMPARISON: [facility], CT ABDOMEN + PELVIS (CONTRAST ONLY) (CPT=74177), 12/21/2024, 11:21 PM.     INDICATIONS: Generalized weakness and unwitnessed fall today.     TECHNIQUE:   Single view.       FINDINGS:  BOWEL GAS PATTERN: Nonobstructive.  Mild-to-moderate colonic fecal burden.  SOFT TISSUES: No gross abnormality  identified.  CALCIFICATIONS: Probable 4 mm and 5 mm right renal calculi.  BONES: Dextroscoliosis of the lumbar spine with advanced multilevel degeneration.  Left hip arthroplasty partially imaged.  OTHER: Atherosclerosis.               Impression   CONCLUSION:  1. Nonobstructive bowel gas pattern.  2. Right renal calculi are suspected.        elm-remote     Dictated by (CST): Horacio Downs MD on 12/31/2024 at 10:34 AM      Finalized by (CST): Horacio Downs MD on 12/31/2024 at 10:36 AM              OTHER RELEVANT DATA:   none     IMPRESSION: Urinary retention in patient with a history of high-grade TA bladder cancer diagnosed at an outside hospital who is followed by [facility].  He is transitioning his care here.  We will plan for catheter change on a monthly basis.  If he is interested in a voiding trial can do a voiding trial the next catheter change.  He does have a home health we will plan for voiding trial and cath change if unsuccessful at next visit in our clinic.  He knows to avoid constipation.  He knows to restart tamsulosin if he is interested in a voiding trial    Given his bladder cancer I discussed surveillance cystoscopies versus BCG.  He opted for watchful waiting.  He denied surveillance cystoscopies or BCG    PLAN:  Voiding trial at next visit  If he fails will need a catheter replaced which should be changed monthly with his home health  If he passes then does not need a catheter  If he is interested in surveillance cystoscopy he can contact us    Thank you for referring this very pleasant patient to my clinic. If you have any questions or concerns, please do not hesitate to contact me.    Sincerely,  Cindi Avery MD    30 minutes were spent on this patient at this visit obtaining a history, reviewing medical records, developing a treatment plan, counseling and discussing treatment strategy with patient, coordination of care and documentation.     The 21st Century Cures Act makes  medical notes available to patients in the interest of transparency.  However, please be advised that this is a medical document.  It is intended as a peer to peer communication.  It is written in medical language and may contain abbreviations or verbiage that are technical and unfamiliar.  It may appear blunt or direct.  Medical documents are intended to carry relevant information, facts as evident, and the clinical opinion of the practitioner.         [1]   Past Medical History:   A-fib (HCC)    Bladder cancer (HCC)    Hypothyroidism   [2] No past surgical history on file.  [3] No Known Allergies  [4] No family history on file.  [5]   Social History  Socioeconomic History    Marital status:    Tobacco Use    Smoking status: Unknown   Vaping Use    Vaping status: Never Used   Substance and Sexual Activity    Alcohol use: Not Currently    Drug use: Not Currently     Social Drivers of Health     Food Insecurity: No Food Insecurity (5/10/2025)    NCSS - Food Insecurity     Worried About Running Out of Food in the Last Year: No     Ran Out of Food in the Last Year: No   Transportation Needs: No Transportation Needs (5/10/2025)    NCSS - Transportation     Lack of Transportation: No   Housing Stability: Not At Risk (5/10/2025)    NCSS - Housing/Utilities     Has Housing: Yes     Worried About Losing Housing: No     Unable to Get Utilities: No

## 2025-05-28 ENCOUNTER — HOSPITAL ENCOUNTER (EMERGENCY)
Facility: HOSPITAL | Age: OVER 89
Discharge: HOME | End: 2025-05-28
Attending: EMERGENCY MEDICINE
Payer: MEDICARE

## 2025-05-28 ENCOUNTER — HOSPITAL ENCOUNTER (OUTPATIENT)
Age: OVER 89
Discharge: STILL ADMITTED OR ADMINISTRATIVE | End: 2025-05-28
Payer: MEDICARE

## 2025-05-28 VITALS
HEART RATE: 82 BPM | TEMPERATURE: 99 F | DIASTOLIC BLOOD PRESSURE: 60 MMHG | RESPIRATION RATE: 15 BRPM | OXYGEN SATURATION: 95 % | SYSTOLIC BLOOD PRESSURE: 126 MMHG

## 2025-05-28 DIAGNOSIS — R39.89 DARK YELLOW-COLORED URINE: Primary | ICD-10-CM

## 2025-05-28 LAB
ANION GAP SERPL CALC-SCNC: 9 MMOL/L (ref 0–18)
BASOPHILS # BLD AUTO: 0.1 X10(3) UL (ref 0–0.2)
BASOPHILS NFR BLD AUTO: 1.8 %
BILIRUB UR QL: NEGATIVE
BUN BLD-MCNC: 23 MG/DL (ref 9–23)
BUN/CREAT SERPL: 17.6 (ref 10–20)
CALCIUM BLD-MCNC: 9.3 MG/DL (ref 8.7–10.4)
CHLORIDE SERPL-SCNC: 105 MMOL/L (ref 98–112)
CO2 SERPL-SCNC: 25 MMOL/L (ref 21–32)
COLOR UR: YELLOW
CREAT BLD-MCNC: 1.31 MG/DL (ref 0.7–1.3)
DEPRECATED RDW RBC AUTO: 97 FL (ref 35.1–46.3)
EGFRCR SERPLBLD CKD-EPI 2021: 50 ML/MIN/1.73M2 (ref 60–?)
EOSINOPHIL # BLD AUTO: 0.14 X10(3) UL (ref 0–0.7)
EOSINOPHIL NFR BLD AUTO: 2.6 %
ERYTHROCYTE [DISTWIDTH] IN BLOOD BY AUTOMATED COUNT: 25.3 % (ref 11–15)
GLUCOSE BLD-MCNC: 116 MG/DL (ref 70–99)
GLUCOSE UR-MCNC: NORMAL MG/DL
HCT VFR BLD AUTO: 28.4 % (ref 39–53)
HGB BLD-MCNC: 9.4 G/DL (ref 13–17.5)
IMM GRANULOCYTES # BLD AUTO: 0.06 X10(3) UL (ref 0–1)
IMM GRANULOCYTES NFR BLD: 1.1 %
KETONES UR-MCNC: NEGATIVE MG/DL
LEUKOCYTE ESTERASE UR QL STRIP.AUTO: 500
LYMPHOCYTES # BLD AUTO: 1.35 X10(3) UL (ref 1–4)
LYMPHOCYTES NFR BLD AUTO: 24.8 %
MCH RBC QN AUTO: 34.3 PG (ref 26–34)
MCHC RBC AUTO-ENTMCNC: 33.1 G/DL (ref 31–37)
MCV RBC AUTO: 103.6 FL (ref 80–100)
MONOCYTES # BLD AUTO: 0.9 X10(3) UL (ref 0.1–1)
MONOCYTES NFR BLD AUTO: 16.5 %
NEUTROPHILS # BLD AUTO: 2.89 X10 (3) UL (ref 1.5–7.7)
NEUTROPHILS # BLD AUTO: 2.89 X10(3) UL (ref 1.5–7.7)
NEUTROPHILS NFR BLD AUTO: 53.2 %
NITRITE UR QL STRIP.AUTO: NEGATIVE
OSMOLALITY SERPL CALC.SUM OF ELEC: 293 MOSM/KG (ref 275–295)
PH UR: 5.5 [PH] (ref 5–8)
PLATELET # BLD AUTO: 384 10(3)UL (ref 150–450)
PLATELET MORPHOLOGY: NORMAL
POTASSIUM SERPL-SCNC: 4.6 MMOL/L (ref 3.5–5.1)
PROT UR-MCNC: 30 MG/DL
RBC # BLD AUTO: 2.74 X10(6)UL (ref 3.8–5.8)
RBC #/AREA URNS AUTO: >10 /HPF
SODIUM SERPL-SCNC: 139 MMOL/L (ref 136–145)
SP GR UR STRIP: 1.01 (ref 1–1.03)
UROBILINOGEN UR STRIP-ACNC: NORMAL
WBC # BLD AUTO: 5.4 X10(3) UL (ref 4–11)
WBC #/AREA URNS AUTO: >50 /HPF
WBC CLUMPS UR QL AUTO: PRESENT /HPF

## 2025-05-28 PROCEDURE — 87186 SC STD MICRODIL/AGAR DIL: CPT | Performed by: EMERGENCY MEDICINE

## 2025-05-28 PROCEDURE — 80048 BASIC METABOLIC PNL TOTAL CA: CPT | Performed by: EMERGENCY MEDICINE

## 2025-05-28 PROCEDURE — 99283 EMERGENCY DEPT VISIT LOW MDM: CPT

## 2025-05-28 PROCEDURE — 85025 COMPLETE CBC W/AUTO DIFF WBC: CPT | Performed by: EMERGENCY MEDICINE

## 2025-05-28 PROCEDURE — 99284 EMERGENCY DEPT VISIT MOD MDM: CPT

## 2025-05-28 PROCEDURE — 87086 URINE CULTURE/COLONY COUNT: CPT | Performed by: EMERGENCY MEDICINE

## 2025-05-28 PROCEDURE — 87077 CULTURE AEROBIC IDENTIFY: CPT | Performed by: EMERGENCY MEDICINE

## 2025-05-28 PROCEDURE — 81001 URINALYSIS AUTO W/SCOPE: CPT | Performed by: EMERGENCY MEDICINE

## 2025-05-28 PROCEDURE — 36415 COLL VENOUS BLD VENIPUNCTURE: CPT

## 2025-05-29 NOTE — ED INITIAL ASSESSMENT (HPI)
Pt ambulatory to triage from home and brought by daughter.     Pt recently was treated with IV antibiotics to treat a UTI. Pt has a catheter in place.     Pt experiencing urinary urgency and dark urine. Home health nurse recommended coming to the ED for evaluation due to recent UTI infection and current symptoms.     Pt is alert.

## 2025-05-29 NOTE — DISCHARGE INSTRUCTIONS
Return if worsening symptoms.  As we discussed, please be sure to answer your phone at all time as we expect the urine culture result to be resulted in 2 days

## 2025-05-29 NOTE — ED QUICK NOTES
Pt alert, ready for discharge.     Leg bag replaced and educated on franklin/umaña care. Importance of promptness and keeping bag capped. Pt and family understanding. Discharge to home.

## 2025-05-29 NOTE — ED PROVIDER NOTES
Patient Seen in: [facility] Emergency Department    History     Chief Complaint   Patient presents with    Urinary Symptoms       HPI    89-year-old male who was sent to the emergency department given that he was told by a nurse that he had mildly dark and yellow urine and patient stated that he had sensation of urinating more though has an indwelling Shields catheter.  Denies any flank pain or nausea or emesis or fevers or suprapubic or any abdominal pain whatsoever.  Still acting his usual self not more confused than usual according to the daughter at the bedside.    History reviewed. Past Medical History[1]    History reviewed. Past Surgical History[2]      Medications :  Prescriptions Prior to Admission[3]     Family History[4]    Smoking Status: Social Hx on file[5]    Constitutional and vital signs reviewed.      Social History and Family History elements reviewed from today, pertinent positives to the presenting problem noted.    Physical Exam     ED Triage Vitals [05/28/25 1921]   /73   Pulse 120   Resp 15   Temp 98.5 °F (36.9 °C)   Temp src Oral   SpO2 95 %   O2 Device None (Room air)       All measures to prevent infection transmission during my interaction with the patient were taken. The patient was already wearing a droplet mask on my arrival to the room. Personal protective equipment was worn throughout the duration of the exam.  Handwashing was performed prior to and after the exam.  Stethoscope and any equipment used during my examination was cleaned with super sani-cloth germicidal wipes following the exam.     Physical Exam    General: NAD  Head: Normocephalic and atraumatic.  Mouth/Throat/Ears/Nose: Oropharynx is clear and moist.   Eyes: Conjunctivae and EOM are normal.   Neck: Normal range of motion. Supple.   Cardiovascular: Normal rate, regular rhythm, normal heart sounds.  Respiratory/Chest: Clear and equal bilaterally. Exhibits no tenderness.  Gastrointestinal: Soft, non-tender,  non-distended. Bowel sounds are normal. Urine bag with yellow urine.   Musculoskeletal:No swelling or deformity.   Neurological: Alert , No focal deficits.   Skin: Skin is warm and dry. No pallor.         ED Course        Labs Reviewed   BASIC METABOLIC PANEL (8) - Abnormal; Notable for the following components:       Result Value    Glucose 116 (*)     Creatinine 1.31 (*)     eGFR-Cr 50 (*)     All other components within normal limits   CBC WITH DIFFERENTIAL WITH PLATELET - Abnormal; Notable for the following components:    RBC 2.74 (*)     HGB 9.4 (*)     HCT 28.4 (*)     .6 (*)     MCH 34.3 (*)     RDW-SD 97.0 (*)     RDW 25.3 (*)     All other components within normal limits   URINALYSIS WITH CULTURE REFLEX - Abnormal; Notable for the following components:    Clarity Urine Ex.Turbid (*)     Blood Urine 3+ (*)     Protein Urine 30 (*)     Leukocyte Esterase Urine 500 (*)     WBC Urine >50 (*)     RBC Urine >10 (*)     Bacteria Urine 1+ (*)     WBC Clump Present (*)     All other components within normal limits   RBC MORPHOLOGY SCAN - Abnormal; Notable for the following components:    RBC Morphology See morphology below (*)     Macrocytosis 2+ (*)     Hypochromia 2+ (*)     All other components within normal limits   URINE CULTURE, ROUTINE       As Interpreted by me    Imaging Results Available and Reviewed while in ED: No results found.  ED Medications Administered: Medications - No data to display      MDM     Vitals:    05/28/25 2030 05/28/25 2045 05/28/25 2115 05/28/25 2215   BP: 104/64 121/57 114/61 126/60   Pulse: 77 81 65 82   Resp: 15      Temp:       TempSrc:       SpO2: 93% 92% 95% 95%     *I personally reviewed and interpreted all ED vitals.    Pulse Ox: 95%, Room air, Normal       Medical Decision Making      Differential Diagnosis/ Diagnostic Considerations: Urinary tract infection    Complicating Factors: The patient already has chronic indwelling Shields to contribute to the complexity of this  ED evaluation.    I reviewed prior chart records including discharge summary from May 11, 2025 admission.  I also reviewed the urine culture from that admission as well as the prior urine culture from 8/4/2025 when the patient had grown Pseudomonas.  Lab work is notable for questionable UTI however patient had very similar appearing urinalysis when he had urine culture that grew no bacteria whatsoever on May 9, 2025 urine culture.      Considered admission however he has essentially no urinary symptoms to suggest UTI and we will hold off on treating as per my discussion with  who agrees. Will await urine culture. Patient and daughter understand they will be contacted by ED pharmacist if positive result.     Dc In stable condition.  Patient and daughter are comfortable with the plan.        Disposition and Plan     Clinical Impression:  1. Dark yellow-colored urine        Disposition:  Discharge    Follow-up:  Garrick Smalls MD  [address] [place] [address]  [address] [place] [id number]  [phone]    Schedule an appointment as soon as possible for a visit in 1 day(s)        Medications Prescribed:  Discharge Medication List as of 5/28/2025 10:26 PM                           [1]   Past Medical History:   A-fib (HCC)    Bladder cancer (HCC)    Hypothyroidism   [2] History reviewed. No pertinent surgical history.  [3] (Not in a hospital admission)   [4] No family history on file.  [5]   Social History  Socioeconomic History    Marital status:    Tobacco Use    Smoking status: Former     Types: Cigarettes   Vaping Use    Vaping status: Never Used   Substance and Sexual Activity    Alcohol use: Not Currently    Drug use: Not Currently

## 2025-07-25 ENCOUNTER — LAB ENCOUNTER (OUTPATIENT)
Dept: LAB | Facility: HOSPITAL | Age: OVER 89
End: 2025-07-25
Attending: INTERNAL MEDICINE
Payer: MEDICARE

## 2025-07-25 DIAGNOSIS — N30.90 CYSTITIS WITHOUT HEMATURIA: Primary | ICD-10-CM

## 2025-07-25 LAB
BILIRUB UR QL: NEGATIVE
COLOR UR: YELLOW
GLUCOSE UR-MCNC: NORMAL MG/DL
KETONES UR-MCNC: NEGATIVE MG/DL
LEUKOCYTE ESTERASE UR QL STRIP.AUTO: 500
PH UR: 7 (ref 5–8)
PROT UR-MCNC: 100 MG/DL
RBC #/AREA URNS AUTO: >10 /HPF
SP GR UR STRIP: 1.02 (ref 1–1.03)
UROBILINOGEN UR STRIP-ACNC: 2
WBC #/AREA URNS AUTO: >50 /HPF
WBC CLUMPS UR QL AUTO: PRESENT /HPF

## 2025-07-25 PROCEDURE — 87086 URINE CULTURE/COLONY COUNT: CPT

## 2025-07-25 PROCEDURE — 81001 URINALYSIS AUTO W/SCOPE: CPT

## 2025-07-30 ENCOUNTER — HOSPITAL ENCOUNTER (EMERGENCY)
Facility: HOSPITAL | Age: OVER 89
Discharge: HOME | End: 2025-07-30
Attending: EMERGENCY MEDICINE

## 2025-07-30 VITALS
SYSTOLIC BLOOD PRESSURE: 112 MMHG | RESPIRATION RATE: 20 BRPM | TEMPERATURE: 98 F | DIASTOLIC BLOOD PRESSURE: 64 MMHG | HEIGHT: 69 IN | WEIGHT: 198.44 LBS | OXYGEN SATURATION: 94 % | BODY MASS INDEX: 29.39 KG/M2 | HEART RATE: 73 BPM

## 2025-07-30 DIAGNOSIS — N30.01 ACUTE CYSTITIS WITH HEMATURIA: ICD-10-CM

## 2025-07-30 DIAGNOSIS — R33.9 URINARY RETENTION: Primary | ICD-10-CM

## 2025-07-30 LAB
BILIRUB UR QL: NEGATIVE
GLUCOSE UR-MCNC: NORMAL MG/DL
KETONES UR-MCNC: NEGATIVE MG/DL
LEUKOCYTE ESTERASE UR QL STRIP.AUTO: 500
NITRITE UR QL STRIP.AUTO: NEGATIVE
PH UR: 6.5 (ref 5–8)
RBC #/AREA URNS AUTO: >10 /HPF
SP GR UR STRIP: 1.01 (ref 1–1.03)
UROBILINOGEN UR STRIP-ACNC: NORMAL
YEAST UR QL: PRESENT /HPF

## 2025-07-30 PROCEDURE — 99284 EMERGENCY DEPT VISIT MOD MDM: CPT

## 2025-07-30 PROCEDURE — 51798 US URINE CAPACITY MEASURE: CPT

## 2025-07-30 PROCEDURE — 87086 URINE CULTURE/COLONY COUNT: CPT | Performed by: EMERGENCY MEDICINE

## 2025-07-30 PROCEDURE — 87186 SC STD MICRODIL/AGAR DIL: CPT | Performed by: EMERGENCY MEDICINE

## 2025-07-30 PROCEDURE — 99283 EMERGENCY DEPT VISIT LOW MDM: CPT

## 2025-07-30 PROCEDURE — 81001 URINALYSIS AUTO W/SCOPE: CPT | Performed by: EMERGENCY MEDICINE

## 2025-07-30 PROCEDURE — 87077 CULTURE AEROBIC IDENTIFY: CPT | Performed by: EMERGENCY MEDICINE

## 2025-07-30 RX ORDER — CEPHALEXIN 500 MG/1
500 CAPSULE ORAL 2 TIMES DAILY
Qty: 10 CAPSULE | Refills: 0 | Status: SHIPPED | OUTPATIENT
Start: 2025-07-30 | End: 2025-08-04

## 2025-08-28 ENCOUNTER — LAB ENCOUNTER (OUTPATIENT)
Dept: LAB | Facility: HOSPITAL | Age: OVER 89
End: 2025-08-28
Attending: INTERNAL MEDICINE

## 2025-08-28 DIAGNOSIS — E78.00 PURE HYPERCHOLESTEROLEMIA: Primary | ICD-10-CM

## 2025-08-28 LAB
ALBUMIN SERPL-MCNC: 4.6 G/DL (ref 3.2–4.8)
ALBUMIN/GLOB SERPL: 1.8 (ref 1–2)
ALP LIVER SERPL-CCNC: 46 U/L (ref 45–117)
ALT SERPL-CCNC: 17 U/L (ref 10–49)
ANION GAP SERPL CALC-SCNC: 8 MMOL/L (ref 0–18)
AST SERPL-CCNC: 29 U/L (ref ?–34)
BASOPHILS # BLD AUTO: 0.12 X10(3) UL (ref 0–0.2)
BASOPHILS NFR BLD AUTO: 1.5 %
BILIRUB SERPL-MCNC: 1.1 MG/DL (ref 0.2–0.9)
BUN BLD-MCNC: 22 MG/DL (ref 9–23)
BUN/CREAT SERPL: 17.7 (ref 10–20)
CALCIUM BLD-MCNC: 9.3 MG/DL (ref 8.7–10.4)
CHLORIDE SERPL-SCNC: 104 MMOL/L (ref 98–112)
CHOLEST SERPL-MCNC: 113 MG/DL (ref ?–200)
CO2 SERPL-SCNC: 24 MMOL/L (ref 21–32)
CREAT BLD-MCNC: 1.24 MG/DL (ref 0.7–1.3)
DEPRECATED RDW RBC AUTO: 88.2 FL (ref 35.1–46.3)
EGFRCR SERPLBLD CKD-EPI 2021: 54 ML/MIN/1.73M2 (ref 60–?)
EOSINOPHIL # BLD AUTO: 0.23 X10(3) UL (ref 0–0.7)
EOSINOPHIL NFR BLD AUTO: 3 %
ERYTHROCYTE [DISTWIDTH] IN BLOOD BY AUTOMATED COUNT: 24.6 % (ref 11–15)
FASTING PATIENT LIPID ANSWER: NO
FASTING STATUS PATIENT QL REPORTED: NO
GLOBULIN PLAS-MCNC: 2.6 G/DL (ref 2–3.5)
GLUCOSE BLD-MCNC: 121 MG/DL (ref 70–99)
HCT VFR BLD AUTO: 26.3 % (ref 39–53)
HDLC SERPL-MCNC: 49 MG/DL (ref 40–59)
HGB BLD-MCNC: 8.8 G/DL (ref 13–17.5)
IMM GRANULOCYTES # BLD AUTO: 0.1 X10(3) UL (ref 0–1)
IMM GRANULOCYTES NFR BLD: 1.3 %
LDLC SERPL CALC-MCNC: 49 MG/DL (ref ?–100)
LYMPHOCYTES # BLD AUTO: 1.89 X10(3) UL (ref 1–4)
LYMPHOCYTES NFR BLD AUTO: 24.4 %
MCH RBC QN AUTO: 33 PG (ref 26–34)
MCHC RBC AUTO-ENTMCNC: 33.5 G/DL (ref 31–37)
MCV RBC AUTO: 98.5 FL (ref 80–100)
MONOCYTES # BLD AUTO: 1.24 X10(3) UL (ref 0.1–1)
MONOCYTES NFR BLD AUTO: 16 %
NEUTROPHILS # BLD AUTO: 4.17 X10 (3) UL (ref 1.5–7.7)
NEUTROPHILS # BLD AUTO: 4.17 X10(3) UL (ref 1.5–7.7)
NEUTROPHILS NFR BLD AUTO: 53.8 %
NONHDLC SERPL-MCNC: 64 MG/DL (ref ?–130)
OSMOLALITY SERPL CALC.SUM OF ELEC: 287 MOSM/KG (ref 275–295)
PLATELET # BLD AUTO: 412 10(3)UL (ref 150–450)
PLATELET MORPHOLOGY: NORMAL
POTASSIUM SERPL-SCNC: 4.2 MMOL/L (ref 3.5–5.1)
PROT SERPL-MCNC: 7.2 G/DL (ref 5.7–8.2)
RBC # BLD AUTO: 2.67 X10(6)UL (ref 3.8–5.8)
SODIUM SERPL-SCNC: 136 MMOL/L (ref 136–145)
TRIGL SERPL-MCNC: 71 MG/DL (ref 30–149)
TSI SER-ACNC: 6.69 UIU/ML (ref 0.55–4.78)
VLDLC SERPL CALC-MCNC: 10 MG/DL (ref 0–30)
WBC # BLD AUTO: 7.8 X10(3) UL (ref 4–11)

## 2025-08-28 PROCEDURE — 84443 ASSAY THYROID STIM HORMONE: CPT

## 2025-08-28 PROCEDURE — 80053 COMPREHEN METABOLIC PANEL: CPT

## 2025-08-28 PROCEDURE — 36415 COLL VENOUS BLD VENIPUNCTURE: CPT

## 2025-08-28 PROCEDURE — 85025 COMPLETE CBC W/AUTO DIFF WBC: CPT

## 2025-08-28 PROCEDURE — 80061 LIPID PANEL: CPT

## (undated) DEVICE — SYRINGE MED 20ML STD CLR PLAS LL TIP N CTRL

## (undated) DEVICE — PACK CUSTOM CYSTO

## (undated) DEVICE — GLOVE SUR 6.5 SENSICARE PI PIP CRM PWD F

## (undated) DEVICE — CATHETER URET 5FR L70CM FLX OPN TIP NONPORTED

## (undated) DEVICE — TUR/ENDOSCOPIC CABLE, 10' (3.05 M): Brand: CONMED

## (undated) DEVICE — GLOVE SUR 7 SENSICARE PI PIP CRM PWD F

## (undated) DEVICE — 24FR BIPLR COAG ELECTRDE, STERILE: Brand: N.A.

## (undated) DEVICE — SLEEVE COMPR MD KNEE LEN SGL USE KENDALL SCD

## (undated) DEVICE — Device

## (undated) DEVICE — SOLUTION IRRIG 1000ML ST H2O AQUALITE PLAS

## (undated) NOTE — LETTER
[facility]  [address], [place], [place]    Authorization for Surgical Operation and Procedure                               I hereby authorize Allyson Luna DO, my physician and his/her assistants (if applicable), which may include medical students, residents, and/or fellows, to perform the following surgical operation/ procedure and administer such anesthesia as may be determined necessary by my physician: Operation/Procedure name (s) CYSTOSCOPY, CLOT EVACUATION, POSSIBLE FULGURATION on Jeff Lamb   2.   I recognize that during the surgical operation/procedure, unforeseen conditions may necessitate additional or different procedures than those listed above.  I, therefore, further authorize and request that the above-named surgeon, assistants, or designees perform such procedures as are, in their judgment, necessary and desirable.    3.   My surgeon/physician has discussed prior to my surgery the potential benefits, risks and side effects of this procedure; the likelihood of achieving goals; and potential problems that might occur during recuperation.  They also discussed reasonable alternatives to the procedure, including risks, benefits, and side effects related to the alternatives and risks related to not receiving this procedure.  I have had all my questions answered and I acknowledge that no guarantee has been made as to the result that may be obtained.    4.   Should the need arise during my operation/procedure, which includes change of level of care prior to discharge, I also consent to the administration of blood and/or blood products.  Further, I understand that despite careful testing and screening of blood or blood products by collecting agencies, I may still be subject to ill effects as a result of receiving a blood transfusion and/or blood products.  The following are some, but not all, of the potential risks that can occur: fever and allergic reactions, hemolytic reactions,  transmission of diseases such as Hepatitis, AIDS and Cytomegalovirus (CMV) and fluid overload.  In the event that I wish to have an autologous transfusion of my own blood, or a directed donor transfusion, I will discuss this with my physician.  Check only if Refusing Blood or Blood Products  I understand refusal of blood or blood products as deemed necessary by my physician may have serious consequences to my condition to include possible death. I hereby assume responsibility for my refusal and release the hospital, its personnel, and my physicians from any responsibility for the consequences of my refusal.    o  Refuse   5.   I authorize the use of any specimen, organs, tissues, body parts or foreign objects that may be removed from my body during the operation/procedure for diagnosis, research or teaching purposes and their subsequent disposal by hospital authorities.  I also authorize the release of specimen test results and/or written reports to my treating physician on the hospital medical staff or other referring or consulting physicians involved in my care, at the discretion of the Pathologist or my treating physician.    6.   I consent to the photographing or videotaping of the operations or procedures to be performed, including appropriate portions of my body for medical, scientific, or educational purposes, provided my identity is not revealed by the pictures or by descriptive texts accompanying them.  If the procedure has been photographed/videotaped, the surgeon will obtain the original picture, image, videotape or CD.  The hospital will not be responsible for storage, release or maintenance of the picture, image, tape or CD.    7.   I consent to the presence of a  or observers in the operating room as deemed necessary by my physician or their designees.    8.   I recognize that in the event my procedure results in extended X-Ray/fluoroscopy time, I may develop a skin reaction.    9. If  I have a Do Not Attempt Resuscitation (DNAR) order in place, that status will be suspended while in the operating room, procedural suite, and during the recovery period unless otherwise explicitly stated by me (or a person authorized to consent on my behalf). The surgeon or my attending physician will determine when the applicable recovery period ends for purposes of reinstating the DNAR order.  10. Patients having a sterilization procedure: I understand that if the procedure is successful the results will be permanent and it will therefore be impossible for me to inseminate, conceive, or bear children.  I also understand that the procedure is intended to result in sterility, although the result has not been guaranteed.   11. I acknowledge that my physician has explained sedation/analgesia administration to me including the risk and benefits I consent to the administration of sedation/analgesia as may be necessary or desirable in the judgment of my physician.    I CERTIFY THAT I HAVE READ AND FULLY UNDERSTAND THE ABOVE CONSENT TO OPERATION and/or OTHER PROCEDURE.     ____________________________________  _________________________________        ______________________________  Signature of Patient    Signature of Responsible Person                Printed Name of Responsible Person                                      ____________________________________  _____________________________                ________________________________  Signature of Witness        Date  Time         Relationship to Patient    STATEMENT OF PHYSICIAN My signature below affirms that prior to the time of the procedure; I have explained to the patient and/or his/her legal representative, the risks and benefits involved in the proposed treatment and any reasonable alternative to the proposed treatment. I have also explained the risks and benefits involved in refusal of the proposed treatment and alternatives to the proposed treatment and have  answered the patient's questions. If I have a significant financial interest in a co-management agreement or a significant financial interest in any product or implant, or other significant relationship used in this procedure/surgery, I have disclosed this and had a discussion with my patient.     _____________________________________________________              _____________________________  (Signature of Physician)                                                                                         (Date)                                   (Time)  Patient Name: Jeff Lamb      : 3/14/1931      Printed: 2024     Medical Record #: [id number]                                      Page 1 of 1

## (undated) NOTE — LETTER
[facility]  Administration of Anesthesia  IJeff agree to be cared for by a physician anesthesiologist alone and/or with a nurse anesthetist, who is specially trained to monitor me and give me medicine to put me to sleep or keep me comfortable during my procedure    I understand that my anesthesiologist and/or anesthetist is not an employee or agent of [facility] or [employer]. He or she works for [facility]    As the patient asking for anesthesia services, I agree to:  Allow the anesthesiologist (anesthesia doctor) to give me medicine and do additional procedures as necessary. Some examples are: Starting or using an “IV” to give me medicine, fluids or blood during my procedure, and having a breathing tube placed to help me breathe when I’m asleep (intubation). In the event that my heart stops working properly, I understand that my anesthesiologist will make every effort to sustain my life, unless otherwise directed by [facility] Do Not Resuscitate documents.  Tell my anesthesia doctor before my procedure:  If I am pregnant.  The last time that I ate or drank.  iii. All of the medicines I take (including prescriptions, herbal supplements, and pills I can buy without a prescription (including street drugs/illegal medications). Failure to inform my anesthesiologist about these medicines may increase my risk of anesthetic complications.  iv.If I am allergic to anything or have had a reaction to anesthesia before.  I understand how the anesthesia medicine will help me (benefits).  I understand that with any type of anesthesia medicine there are risks:  The most common risks are: nausea, vomiting, sore throat, muscle soreness, damage to my eyes, mouth, or teeth (from breathing tube placement).  Rare risks include: remembering what happened during my procedure, allergic reactions to medications, injury to my airway, heart, lungs, vision, nerves, or muscles  and in extremely rare instances death.  My doctor has explained to me other choices available to me for my care (alternatives).  Pregnant Patients (“epidural”):  I understand that the risks of having an epidural (medicine given into my back to help control pain during labor), include itching, low blood pressure, difficulty urinating, headache or slowing of the baby’s heart. Very rare risks include infection, bleeding, seizure, irregular heart rhythms and nerve injury.  Regional Anesthesia (“spinal”, “epidural”, & “nerve blocks”):  I understand that rare but potential complications include headache, bleeding, infection, seizure, irregular heart rhythms, and nerve injury.    _____________________________________________________________________________  Patient (or Representative) Signature/Relationship to Patient  Date   Time    _____________________________________________________________________________   Name (if used)    Language/Organization   Time    _____________________________________________________________________________  Nurse Anesthetist Signature     Date   Time  _____________________________________________________________________________  Anesthesiologist Signature     Date   Time  I have discussed the procedure and information above with the patient (or patient’s representative) and answered their questions. The patient or their representative has agreed to have anesthesia services.    _____________________________________________________________________________  Witness        Date   Time  I have verified that the signature is that of the patient or patient’s representative, and that it was signed before the procedure  Patient Name: Jeff Lamb     : 3/14/1931                 Printed: 2024 at 3:17 PM    Medical Record #: [id number]                                            Page 1 of 1  ----------ANESTHESIA CONSENT----------

## (undated) NOTE — LETTER
[facility]  Administration of Anesthesia  IJeff agree to be cared for by a physician anesthesiologist alone and/or with a nurse anesthetist, who is specially trained to monitor me and give me medicine to put me to sleep or keep me comfortable during my procedure    I understand that my anesthesiologist and/or anesthetist is not an employee or agent of [facility] or [employer]. He or she works for [facility]    As the patient asking for anesthesia services, I agree to:  Allow the anesthesiologist (anesthesia doctor) to give me medicine and do additional procedures as necessary. Some examples are: Starting or using an “IV” to give me medicine, fluids or blood during my procedure, and having a breathing tube placed to help me breathe when I’m asleep (intubation). In the event that my heart stops working properly, I understand that my anesthesiologist will make every effort to sustain my life, unless otherwise directed by [facility] Do Not Resuscitate documents.  Tell my anesthesia doctor before my procedure:  If I am pregnant.  The last time that I ate or drank.  iii. All of the medicines I take (including prescriptions, herbal supplements, and pills I can buy without a prescription (including street drugs/illegal medications). Failure to inform my anesthesiologist about these medicines may increase my risk of anesthetic complications.  iv.If I am allergic to anything or have had a reaction to anesthesia before.  I understand how the anesthesia medicine will help me (benefits).  I understand that with any type of anesthesia medicine there are risks:  The most common risks are: nausea, vomiting, sore throat, muscle soreness, damage to my eyes, mouth, or teeth (from breathing tube placement).  Rare risks include: remembering what happened during my procedure, allergic reactions to medications, injury to my airway, heart, lungs, vision, nerves, or muscles  and in extremely rare instances death.  My doctor has explained to me other choices available to me for my care (alternatives).  Pregnant Patients (“epidural”):  I understand that the risks of having an epidural (medicine given into my back to help control pain during labor), include itching, low blood pressure, difficulty urinating, headache or slowing of the baby’s heart. Very rare risks include infection, bleeding, seizure, irregular heart rhythms and nerve injury.  Regional Anesthesia (“spinal”, “epidural”, & “nerve blocks”):  I understand that rare but potential complications include headache, bleeding, infection, seizure, irregular heart rhythms, and nerve injury.    _____________________________________________________________________________  Patient (or Representative) Signature/Relationship to Patient  Date   Time    _____________________________________________________________________________   Name (if used)    Language/Organization   Time    _____________________________________________________________________________  Nurse Anesthetist Signature     Date   Time  _____________________________________________________________________________  Anesthesiologist Signature     Date   Time  I have discussed the procedure and information above with the patient (or patient’s representative) and answered their questions. The patient or their representative has agreed to have anesthesia services.    _____________________________________________________________________________  Witness        Date   Time  I have verified that the signature is that of the patient or patient’s representative, and that it was signed before the procedure  Patient Name: Jeff Lamb     : 3/14/1931                 Printed: 2024 at 5:53 PM    Medical Record #: [id number]                                            Page 1 of 1  ----------ANESTHESIA CONSENT----------

## (undated) NOTE — Clinical Note
[place] [address]  Authorization for Invasive Procedures  Date: ***           Time: ***    {[facility] ivs consent:[id number]}

## (undated) NOTE — LETTER
Hospital Discharge Documentation      From: [facility] Hospitalist's Office  Phone: [phone]    Patient discharged time/date: 2025    Patient discharge disposition: [facility]   Discharge Summary not available, attached latest progress note:          Fred Oreilly MD   Physician  Internal Medicine     Progress Notes     Signed     Date of Service: 5/10/2025  4:58 PM     Signed       Expand All Collapse All       Progress Note            Jeff Lamb Patient Status:  Inpatient    3/14/1931 MRN [id number]   Location [facility] 5SW/SE Attending Fred Oreilly MD   Hosp Day # 1 PCP DEBRA DENNY MD      Chief Complaint:       Chief Complaint   Patient presents with    Urinary Symptoms            Subjective:  S: Patient seen and examined, chart reviewed.   Patient's urine culture has been negative for 18 hours.  His culture from a week ago had Pseudomonas sensitive to all those checked.  However he failed outpatient therapy with cephalexin then Cipro.  Now he is on Zosyn.  He feels well today.  He has a chronic Shields.        Review of Systems:   10 point ROS completed and was negative, except for pertinent positive and negatives stated in subjective.                       Objective:  Vital signs:  Temp:  [97.6 °F (36.4 °C)-98.6 °F (37 °C)] 98.6 °F (37 °C)  Pulse:  [59-93] 59  Resp:  [18-20] 18  BP: ()/(60-78) 104/60  SpO2:  [93 %-96 %] 95 %         Wt Readings from Last 6 Encounters:   25 190 lb 8 oz (86.4 kg)   24 175 lb (79.4 kg)   24 165 lb 2 oz (74.9 kg)   23 168 lb (76.2 kg)   22 174 lb (78.9 kg)   21 174 lb (78.9 kg)         Physical Exam:    General: No acute distress.   Respiratory: Clear to auscultation bilaterally. No wheezes. No rhonchi.  Cardiovascular: S1, S2. Regular rate and rhythm. No murmurs, rubs or gallops.   Abdomen: Soft, nontender, nondistended.  Positive  bowel sounds. No rebound or guarding.  Neurologic: No focal neurological deficits.   Musculoskeletal: Moves all extremities.  Extremities: No edema.     Results:  Diagnostic Data:       Labs:     Labs Last 24 Hours:                      BMP         CBC       Other      Na 140 Cl 107 BUN 23 Glu 101     Hb 8.4     PTT - Procal -    K 4.2 CO2 24.0 Cr 1.16     WBC 4.4 >< .0   INR - CRP -    Renal Lytes Endo       Hct 26.0     Trop - D dim -    eGFR - Ca 8.6 POC Gluc  -       LFT     pBNP - Lactic 2.0    eGFR AA - PO4 - A1c -     AST - APk - Prot -   LDL -              Recent Labs   Lab 05/09/25  2049 05/10/25  0542   RBC 2.67* 2.54*   HGB 8.7* 8.4*   HCT 26.6* 26.0*   MCV 99.6 102.4*   MCH 32.6 33.1   MCHC 32.7 32.3   RDW 24.5* 24.2*   NEPRELIM 2.96 2.22   WBC 6.1 4.4   .0 326.0               Lab Results   Component Value Date     PT 13.7 11/24/2015     INR 1.1 11/24/2015              Recent Labs   Lab 05/09/25  2046 05/10/25  0542   * 101*   BUN 25* 23   CREATSERUM 1.37* 1.16   CA 9.0 8.6*    140   K 4.2 4.2    107   CO2 24.0 24.0         No results for input(s): \"LAUREEN\", \"LIP\" in the last 168 hours.     No results for input(s): \"ESRML\", \"ESRPF\", \"CRP\", \"MG\", \"PHOS\", \"TROP\", \"B12\" in the last 168 hours.     No results for input(s): \"URINE\", \"CULTI\", \"BLDSMR\" in the last 168 hours.        No results found.           Pro-Calcitonin  No results for input(s): \"PCT\" in the last 168 hours.     Cardiac  No results for input(s): \"TROP\", \"PBNP\" in the last 168 hours.     Imaging: Imaging data reviewed in Epic.     No results found.        Medications: [Scheduled Medications]    [Scheduled Medications]   donepezil  5 mg Oral Nightly    levothyroxine  25 mcg Oral Daily @ 0700    tamsulosin  0.4 mg Oral Daily    heparin  5,000 Units Subcutaneous Q12H    pantoprazole  40 mg Oral QAM AC    piperacillin-tazobactam  4.5 g Intravenous Q8H    vancomycin  125 mg Oral Daily    mirtazapine  15 mg Oral Nightly         Assessment & Plan:  ASSESSMENT / PLAN:      Complicated UTI  Currently growing Pseudomonas, started on Zosyn in ER will continue the same, continue to monitor clinically.  Follow-up urine cultures currently negative     Acute kidney injury  Likely secondary dehydration, IV fluids initiated, avoid nephrotoxic medications.  Repeat BMP later.     Bladder cancer  Follows up with urology as advised     Chronic macrocytic anemia  No signs of active bleeding we will continue to monitor as needed.     BPH  Continue Flomax     Generalized asthenia  Likely multifactorial, PT/OT to evaluate and treat, continue fluids.     Prophylaxis  Subcutaneous heparin hold if develops hematuria     CODE STATUS  Full           dvt prophylaxis: sc heparin  code status: full code  dispo: home upon medical clearance     I personally reviewed the available laboratories, imaging including cultures. I discussed/will discuss the case with patient and his family. I ordered laboratories, studies including a.m. labs. I adjusted medications including home medications. Medical decision making high, risk is high.     >55min spent, >50% spent counseling and coordinating care in the form of educating pt/family and d/w consultants and staff. Most of the time spent discussing the above plan.      Estimated date of discharge: To be determined  Discharge is dependent on: Negative cultures  At this point Mr. Lamb is expected to be discharge to: Home     Plan of care discussed with patient and his family     Fred Oreilly MD, FAAP, FACP  [facility] Hospitalist  I respond to [employer] and [employer]            Electronically signed by Fred Oreilly MD at 5/10/2025  5:00 PM         ED to Hosp-Admission (Discharged) on 5/9/2025            Detailed Report          Note shared with patient  Chart Review: Note Routing History    No routing history on file.  Care Timeline    05/09   Admitted from ED 0384   05/11   Discharged 2029

## (undated) NOTE — LETTER
[facility] 4W/SW/SE  [address]  [facility] [address]  [phone]    Blood Transfusion Consent    In the course of your treatment, it may become necessary to administer a transfusion of blood or blood components. This form provides basic information concerning this procedure and, if signed by you, authorizes its administration. By signing this form, you agree that all of your questions about the administration of blood or blood products have been answered by the ordering medical professional or designee.    Description of Procedure  Blood is introduced into one of your veins, commonly in the arm, using a sterilized disposable needle. The amount of blood transfused, and whether the transfusion will be of blood or blood components is a judgement the physician will make based on your particular needs.    Risks  The transfusion is a common procedure of low risk.  MINOR AND TEMPORARY REACTIONS ARE NOT UNCOMMON, including a slight bruise, swelling or local reaction in the area where the needle pierces your skin, or a nonserious reaction to the transfused material itself, including headache, fever or mild skin reaction, such as rash.  Serious reactions are possible, though very unlikely, and include severe allergic reaction (shock) and destruction (hemolysis) of transfused blood cells.  Infectious diseases which are known to be transmitted by blood transfusion include certain types of viral Hepatitis(liver infection from a virus), Human Immunodeficiency Virus (HIV-1,2) infection, a viral infection known to cause Acquired Immunodeficiency Syndrome (AIDS), as well as certain other bacterial, viral, and parasitic diseases. While a minimal risk of acquiring an infectious disease from transfused blood exists, in accordance with the Federal and State law, all due care has been taken in donor selection and testing to avoid transmission of disease.    Alternatives  If loss of blood poses serious threats during your  treatment, THERE IS NO EFFECTIVE ALTERNATIVE TO BLOOD TRANSFUSION. However, if you have any further questions on this matter, your provider will fully explain the alternatives to you if it has not already been done.    I, ______________________________, have read/had read to me the above. I understand the matters bearing on the decision whether or not to authorize a transfusion of blood or blood components. I have no questions which have not been answered to my full satisfaction. I hereby consent to such transfusion as my physician may deem necessary or advisable in the course of my treatment.    ______________________________________________                    ___________________________  (Signature of Patient or Responsible party in case of minor,                 (Printed Name of Patient or incompetent, or unconscious patient)              Responsible Party)    ___________________________               _____________________  (Relationship to Patient if not self)                                    (Date and Time)    __________________________                                                           ______________________              (Signature of Witness)               (Printed Name of Witness)     Language line ()    Telephone/Verbal/Video Consent    __________________________                     ____________________  (Signature of 2nd Witness           (Printed Name of 2nd  Telephone/Verbal/Video Consent)           Witness)    Patient Name: Jeff Lamb     : 3/14/1931                 Printed: 2024     Medical Record #: [id number]      Rev: 2023

## (undated) NOTE — LETTER
[facility]  [address], [place], [place]    Authorization for Surgical Operation and Procedure                               I hereby authorize Allyson Luna DO, my physician and his/her assistants (if applicable), which may include medical students, residents, and/or fellows, to perform the following surgical operation/ procedure and administer such anesthesia as may be determined necessary by my physician: Operation/Procedure name (s) CYSTOSCOPY, CLOT EVACUATION, POSSIBLE FULGURATION on Jeff Lamb   2.   I recognize that during the surgical operation/procedure, unforeseen conditions may necessitate additional or different procedures than those listed above.  I, therefore, further authorize and request that the above-named surgeon, assistants, or designees perform such procedures as are, in their judgment, necessary and desirable.    3.   My surgeon/physician has discussed prior to my surgery the potential benefits, risks and side effects of this procedure; the likelihood of achieving goals; and potential problems that might occur during recuperation.  They also discussed reasonable alternatives to the procedure, including risks, benefits, and side effects related to the alternatives and risks related to not receiving this procedure.  I have had all my questions answered and I acknowledge that no guarantee has been made as to the result that may be obtained.    4.   Should the need arise during my operation/procedure, which includes change of level of care prior to discharge, I also consent to the administration of blood and/or blood products.  Further, I understand that despite careful testing and screening of blood or blood products by collecting agencies, I may still be subject to ill effects as a result of receiving a blood transfusion and/or blood products.  The following are some, but not all, of the potential risks that can occur: fever and allergic reactions, hemolytic reactions,  transmission of diseases such as Hepatitis, AIDS and Cytomegalovirus (CMV) and fluid overload.  In the event that I wish to have an autologous transfusion of my own blood, or a directed donor transfusion, I will discuss this with my physician.  Check only if Refusing Blood or Blood Products  I understand refusal of blood or blood products as deemed necessary by my physician may have serious consequences to my condition to include possible death. I hereby assume responsibility for my refusal and release the hospital, its personnel, and my physicians from any responsibility for the consequences of my refusal.    o  Refuse   5.   I authorize the use of any specimen, organs, tissues, body parts or foreign objects that may be removed from my body during the operation/procedure for diagnosis, research or teaching purposes and their subsequent disposal by hospital authorities.  I also authorize the release of specimen test results and/or written reports to my treating physician on the hospital medical staff or other referring or consulting physicians involved in my care, at the discretion of the Pathologist or my treating physician.    6.   I consent to the photographing or videotaping of the operations or procedures to be performed, including appropriate portions of my body for medical, scientific, or educational purposes, provided my identity is not revealed by the pictures or by descriptive texts accompanying them.  If the procedure has been photographed/videotaped, the surgeon will obtain the original picture, image, videotape or CD.  The hospital will not be responsible for storage, release or maintenance of the picture, image, tape or CD.    7.   I consent to the presence of a  or observers in the operating room as deemed necessary by my physician or their designees.    8.   I recognize that in the event my procedure results in extended X-Ray/fluoroscopy time, I may develop a skin reaction.    9. If  I have a Do Not Attempt Resuscitation (DNAR) order in place, that status will be suspended while in the operating room, procedural suite, and during the recovery period unless otherwise explicitly stated by me (or a person authorized to consent on my behalf). The surgeon or my attending physician will determine when the applicable recovery period ends for purposes of reinstating the DNAR order.  10. Patients having a sterilization procedure: I understand that if the procedure is successful the results will be permanent and it will therefore be impossible for me to inseminate, conceive, or bear children.  I also understand that the procedure is intended to result in sterility, although the result has not been guaranteed.   11. I acknowledge that my physician has explained sedation/analgesia administration to me including the risk and benefits I consent to the administration of sedation/analgesia as may be necessary or desirable in the judgment of my physician.    I CERTIFY THAT I HAVE READ AND FULLY UNDERSTAND THE ABOVE CONSENT TO OPERATION and/or OTHER PROCEDURE.     ____________________________________  _________________________________        ______________________________  Signature of Patient    Signature of Responsible Person                Printed Name of Responsible Person                                      ____________________________________  _____________________________                ________________________________  Signature of Witness        Date  Time         Relationship to Patient    STATEMENT OF PHYSICIAN My signature below affirms that prior to the time of the procedure; I have explained to the patient and/or his/her legal representative, the risks and benefits involved in the proposed treatment and any reasonable alternative to the proposed treatment. I have also explained the risks and benefits involved in refusal of the proposed treatment and alternatives to the proposed treatment and have  answered the patient's questions. If I have a significant financial interest in a co-management agreement or a significant financial interest in any product or implant, or other significant relationship used in this procedure/surgery, I have disclosed this and had a discussion with my patient.     _____________________________________________________              _____________________________  (Signature of Physician)                                                                                         (Date)                                   (Time)  Patient Name: Jeff Lamb      : 3/14/1931      Printed: 2024     Medical Record #: [id number]                                      Page 1 of 1